# Patient Record
Sex: FEMALE | Race: WHITE | Employment: OTHER | URBAN - METROPOLITAN AREA
[De-identification: names, ages, dates, MRNs, and addresses within clinical notes are randomized per-mention and may not be internally consistent; named-entity substitution may affect disease eponyms.]

---

## 2017-03-20 ENCOUNTER — OFFICE VISIT (OUTPATIENT)
Dept: GASTROENTEROLOGY | Age: 67
End: 2017-03-20
Payer: MEDICARE

## 2017-03-20 VITALS
OXYGEN SATURATION: 93 % | BODY MASS INDEX: 24.63 KG/M2 | HEART RATE: 107 BPM | DIASTOLIC BLOOD PRESSURE: 77 MMHG | WEIGHT: 139 LBS | SYSTOLIC BLOOD PRESSURE: 121 MMHG | TEMPERATURE: 98.2 F | RESPIRATION RATE: 14 BRPM | HEIGHT: 63 IN

## 2017-03-20 DIAGNOSIS — R74.8 ELEVATED LIVER ENZYMES: ICD-10-CM

## 2017-03-20 DIAGNOSIS — D18.03 LIVER HEMANGIOMA: ICD-10-CM

## 2017-03-20 DIAGNOSIS — K57.90 DIVERTICULOSIS OF INTESTINE WITHOUT BLEEDING, UNSPECIFIED INTESTINAL TRACT LOCATION: ICD-10-CM

## 2017-03-20 DIAGNOSIS — K21.9 GASTROESOPHAGEAL REFLUX DISEASE WITHOUT ESOPHAGITIS: Primary | ICD-10-CM

## 2017-03-20 PROCEDURE — 3014F SCREEN MAMMO DOC REV: CPT | Performed by: INTERNAL MEDICINE

## 2017-03-20 PROCEDURE — G8428 CUR MEDS NOT DOCUMENT: HCPCS | Performed by: INTERNAL MEDICINE

## 2017-03-20 PROCEDURE — 99214 OFFICE O/P EST MOD 30 MIN: CPT | Performed by: INTERNAL MEDICINE

## 2017-03-20 PROCEDURE — 1036F TOBACCO NON-USER: CPT | Performed by: INTERNAL MEDICINE

## 2017-03-20 PROCEDURE — G8400 PT W/DXA NO RESULTS DOC: HCPCS | Performed by: INTERNAL MEDICINE

## 2017-03-20 PROCEDURE — G8484 FLU IMMUNIZE NO ADMIN: HCPCS | Performed by: INTERNAL MEDICINE

## 2017-03-20 PROCEDURE — 3017F COLORECTAL CA SCREEN DOC REV: CPT | Performed by: INTERNAL MEDICINE

## 2017-03-20 PROCEDURE — G8420 CALC BMI NORM PARAMETERS: HCPCS | Performed by: INTERNAL MEDICINE

## 2017-03-20 PROCEDURE — 1090F PRES/ABSN URINE INCON ASSESS: CPT | Performed by: INTERNAL MEDICINE

## 2017-03-20 PROCEDURE — 4040F PNEUMOC VAC/ADMIN/RCVD: CPT | Performed by: INTERNAL MEDICINE

## 2017-03-20 PROCEDURE — 1123F ACP DISCUSS/DSCN MKR DOCD: CPT | Performed by: INTERNAL MEDICINE

## 2017-03-20 RX ORDER — GREEN TEA/HOODIA GORDONII 315-12.5MG
1 CAPSULE ORAL DAILY
Qty: 30 TABLET | Refills: 3 | Status: SHIPPED | OUTPATIENT
Start: 2017-03-20 | End: 2017-08-10

## 2017-03-20 ASSESSMENT — ENCOUNTER SYMPTOMS
VOMITING: 0
RECTAL PAIN: 0
ABDOMINAL DISTENTION: 0
COUGH: 1
DIARRHEA: 1
CONSTIPATION: 0
NAUSEA: 0
EYES NEGATIVE: 1
ALLERGIC/IMMUNOLOGIC NEGATIVE: 1
ABDOMINAL PAIN: 0
ANAL BLEEDING: 0
SINUS PRESSURE: 1
BLOOD IN STOOL: 0

## 2017-04-07 ENCOUNTER — HOSPITAL ENCOUNTER (OUTPATIENT)
Dept: MRI IMAGING | Age: 67
Discharge: HOME | End: 2017-04-07
Payer: MEDICARE

## 2017-04-07 DIAGNOSIS — H91.91 HEARING LOSS, RIGHT: ICD-10-CM

## 2017-04-07 LAB
CREAT SERPL-MCNC: 0.79 MG/DL (ref 0.5–0.9)
GFR AFRICAN AMERICAN: >60 ML/MIN
GFR NON-AFRICAN AMERICAN: >60 ML/MIN
GFR SERPL CREATININE-BSD FRML MDRD: NORMAL ML/MIN/{1.73_M2}
GFR SERPL CREATININE-BSD FRML MDRD: NORMAL ML/MIN/{1.73_M2}

## 2017-04-07 PROCEDURE — 70553 MRI BRAIN STEM W/O & W/DYE: CPT

## 2017-04-07 PROCEDURE — A9579 GAD-BASE MR CONTRAST NOS,1ML: HCPCS | Performed by: OTOLARYNGOLOGY

## 2017-04-07 PROCEDURE — 82565 ASSAY OF CREATININE: CPT

## 2017-04-07 PROCEDURE — 6360000004 HC RX CONTRAST MEDICATION: Performed by: OTOLARYNGOLOGY

## 2017-04-07 PROCEDURE — 2580000003 HC RX 258: Performed by: OTOLARYNGOLOGY

## 2017-04-07 PROCEDURE — 36415 COLL VENOUS BLD VENIPUNCTURE: CPT

## 2017-04-07 RX ORDER — SODIUM CHLORIDE 0.9 % (FLUSH) 0.9 %
10 SYRINGE (ML) INJECTION PRN
Status: DISCONTINUED | OUTPATIENT
Start: 2017-04-07 | End: 2017-04-10 | Stop reason: HOSPADM

## 2017-04-07 RX ADMIN — Medication 10 ML: at 14:11

## 2017-04-07 RX ADMIN — GADOPENTETATE DIMEGLUMINE 12 ML: 469.01 INJECTION INTRAVENOUS at 14:10

## 2017-07-20 ENCOUNTER — HOSPITAL ENCOUNTER (OUTPATIENT)
Dept: WOMENS IMAGING | Age: 67
Discharge: HOME | End: 2017-07-20
Payer: MEDICARE

## 2017-07-20 DIAGNOSIS — Z13.9 SCREENING: ICD-10-CM

## 2017-07-20 PROCEDURE — 77063 BREAST TOMOSYNTHESIS BI: CPT

## 2017-08-10 ENCOUNTER — OFFICE VISIT (OUTPATIENT)
Dept: GASTROENTEROLOGY | Age: 67
End: 2017-08-10
Payer: MEDICARE

## 2017-08-10 VITALS
BODY MASS INDEX: 25.34 KG/M2 | WEIGHT: 143 LBS | HEART RATE: 92 BPM | RESPIRATION RATE: 14 BRPM | TEMPERATURE: 97.3 F | DIASTOLIC BLOOD PRESSURE: 82 MMHG | OXYGEN SATURATION: 96 % | SYSTOLIC BLOOD PRESSURE: 129 MMHG | HEIGHT: 63 IN

## 2017-08-10 DIAGNOSIS — R19.7 DIARRHEA, UNSPECIFIED TYPE: ICD-10-CM

## 2017-08-10 DIAGNOSIS — K21.9 GASTROESOPHAGEAL REFLUX DISEASE WITHOUT ESOPHAGITIS: Primary | ICD-10-CM

## 2017-08-10 PROCEDURE — 1036F TOBACCO NON-USER: CPT | Performed by: INTERNAL MEDICINE

## 2017-08-10 PROCEDURE — 99214 OFFICE O/P EST MOD 30 MIN: CPT | Performed by: INTERNAL MEDICINE

## 2017-08-10 PROCEDURE — 3017F COLORECTAL CA SCREEN DOC REV: CPT | Performed by: INTERNAL MEDICINE

## 2017-08-10 PROCEDURE — G8400 PT W/DXA NO RESULTS DOC: HCPCS | Performed by: INTERNAL MEDICINE

## 2017-08-10 PROCEDURE — G8427 DOCREV CUR MEDS BY ELIG CLIN: HCPCS | Performed by: INTERNAL MEDICINE

## 2017-08-10 PROCEDURE — 4040F PNEUMOC VAC/ADMIN/RCVD: CPT | Performed by: INTERNAL MEDICINE

## 2017-08-10 PROCEDURE — 1123F ACP DISCUSS/DSCN MKR DOCD: CPT | Performed by: INTERNAL MEDICINE

## 2017-08-10 PROCEDURE — 3014F SCREEN MAMMO DOC REV: CPT | Performed by: INTERNAL MEDICINE

## 2017-08-10 PROCEDURE — G8419 CALC BMI OUT NRM PARAM NOF/U: HCPCS | Performed by: INTERNAL MEDICINE

## 2017-08-10 PROCEDURE — 1090F PRES/ABSN URINE INCON ASSESS: CPT | Performed by: INTERNAL MEDICINE

## 2017-08-10 ASSESSMENT — ENCOUNTER SYMPTOMS
ANAL BLEEDING: 0
ABDOMINAL DISTENTION: 0
ABDOMINAL PAIN: 0
VOMITING: 0
NAUSEA: 0
COUGH: 0
RECTAL PAIN: 0
EYES NEGATIVE: 1
BLOOD IN STOOL: 0
CONSTIPATION: 0
RESPIRATORY NEGATIVE: 1
DIARRHEA: 1
ALLERGIC/IMMUNOLOGIC NEGATIVE: 1
SINUS PRESSURE: 0

## 2018-01-10 ENCOUNTER — HOSPITAL ENCOUNTER (OUTPATIENT)
Age: 68
Discharge: HOME | End: 2018-01-10
Payer: MEDICARE

## 2018-01-10 DIAGNOSIS — E78.5 HYPERLIPIDEMIA WITH TARGET LDL LESS THAN 130: Chronic | ICD-10-CM

## 2018-01-10 LAB
ALBUMIN SERPL-MCNC: 4.2 G/DL (ref 3.5–5.2)
ALBUMIN/GLOBULIN RATIO: ABNORMAL (ref 1–2.5)
ALP BLD-CCNC: 72 U/L (ref 35–104)
ALT SERPL-CCNC: 21 U/L (ref 5–33)
ANION GAP SERPL CALCULATED.3IONS-SCNC: 14 MMOL/L (ref 9–17)
AST SERPL-CCNC: 16 U/L
BILIRUB SERPL-MCNC: 0.5 MG/DL (ref 0.3–1.2)
BUN BLDV-MCNC: 15 MG/DL (ref 8–23)
BUN/CREAT BLD: ABNORMAL (ref 9–20)
CALCIUM SERPL-MCNC: 9.4 MG/DL (ref 8.6–10.4)
CHLORIDE BLD-SCNC: 105 MMOL/L (ref 98–107)
CHOLESTEROL/HDL RATIO: 3.4
CHOLESTEROL: 207 MG/DL
CO2: 24 MMOL/L (ref 20–31)
CREAT SERPL-MCNC: 0.69 MG/DL (ref 0.5–0.9)
GFR AFRICAN AMERICAN: >60 ML/MIN
GFR NON-AFRICAN AMERICAN: >60 ML/MIN
GFR SERPL CREATININE-BSD FRML MDRD: ABNORMAL ML/MIN/{1.73_M2}
GFR SERPL CREATININE-BSD FRML MDRD: ABNORMAL ML/MIN/{1.73_M2}
GLUCOSE BLD-MCNC: 108 MG/DL (ref 70–99)
HDLC SERPL-MCNC: 61 MG/DL
LDL CHOLESTEROL: 88 MG/DL (ref 0–130)
POTASSIUM SERPL-SCNC: 4.2 MMOL/L (ref 3.7–5.3)
SODIUM BLD-SCNC: 143 MMOL/L (ref 135–144)
TOTAL PROTEIN: 6.7 G/DL (ref 6.4–8.3)
TRIGL SERPL-MCNC: 289 MG/DL
VLDLC SERPL CALC-MCNC: ABNORMAL MG/DL (ref 1–30)

## 2018-01-10 PROCEDURE — 80061 LIPID PANEL: CPT

## 2018-01-10 PROCEDURE — 80053 COMPREHEN METABOLIC PANEL: CPT

## 2018-01-10 PROCEDURE — 36415 COLL VENOUS BLD VENIPUNCTURE: CPT

## 2018-02-05 ENCOUNTER — OFFICE VISIT (OUTPATIENT)
Dept: GASTROENTEROLOGY | Age: 68
End: 2018-02-05
Payer: MEDICARE

## 2018-02-05 VITALS
HEIGHT: 63 IN | HEART RATE: 76 BPM | BODY MASS INDEX: 25.34 KG/M2 | SYSTOLIC BLOOD PRESSURE: 133 MMHG | DIASTOLIC BLOOD PRESSURE: 86 MMHG | OXYGEN SATURATION: 97 % | RESPIRATION RATE: 14 BRPM | WEIGHT: 143 LBS

## 2018-02-05 DIAGNOSIS — R19.7 DIARRHEA, UNSPECIFIED TYPE: ICD-10-CM

## 2018-02-05 DIAGNOSIS — Z86.010 HX OF COLONIC POLYPS: ICD-10-CM

## 2018-02-05 DIAGNOSIS — K21.9 GASTROESOPHAGEAL REFLUX DISEASE WITHOUT ESOPHAGITIS: Primary | ICD-10-CM

## 2018-02-05 DIAGNOSIS — R74.8 ELEVATED LIVER ENZYMES: ICD-10-CM

## 2018-02-05 DIAGNOSIS — D18.03 LIVER HEMANGIOMA: ICD-10-CM

## 2018-02-05 DIAGNOSIS — K57.90 DIVERTICULOSIS OF INTESTINE WITHOUT BLEEDING, UNSPECIFIED INTESTINAL TRACT LOCATION: ICD-10-CM

## 2018-02-05 PROCEDURE — 3014F SCREEN MAMMO DOC REV: CPT | Performed by: INTERNAL MEDICINE

## 2018-02-05 PROCEDURE — G8427 DOCREV CUR MEDS BY ELIG CLIN: HCPCS | Performed by: INTERNAL MEDICINE

## 2018-02-05 PROCEDURE — 1123F ACP DISCUSS/DSCN MKR DOCD: CPT | Performed by: INTERNAL MEDICINE

## 2018-02-05 PROCEDURE — 3017F COLORECTAL CA SCREEN DOC REV: CPT | Performed by: INTERNAL MEDICINE

## 2018-02-05 PROCEDURE — G8400 PT W/DXA NO RESULTS DOC: HCPCS | Performed by: INTERNAL MEDICINE

## 2018-02-05 PROCEDURE — G8417 CALC BMI ABV UP PARAM F/U: HCPCS | Performed by: INTERNAL MEDICINE

## 2018-02-05 PROCEDURE — 4040F PNEUMOC VAC/ADMIN/RCVD: CPT | Performed by: INTERNAL MEDICINE

## 2018-02-05 PROCEDURE — 99214 OFFICE O/P EST MOD 30 MIN: CPT | Performed by: INTERNAL MEDICINE

## 2018-02-05 PROCEDURE — 1090F PRES/ABSN URINE INCON ASSESS: CPT | Performed by: INTERNAL MEDICINE

## 2018-02-05 PROCEDURE — G8482 FLU IMMUNIZE ORDER/ADMIN: HCPCS | Performed by: INTERNAL MEDICINE

## 2018-02-05 PROCEDURE — 1036F TOBACCO NON-USER: CPT | Performed by: INTERNAL MEDICINE

## 2018-02-05 RX ORDER — SODIUM, POTASSIUM,MAG SULFATES 17.5-3.13G
SOLUTION, RECONSTITUTED, ORAL ORAL
Qty: 1 BOTTLE | Refills: 0 | Status: SHIPPED | OUTPATIENT
Start: 2018-02-05 | End: 2018-09-19

## 2018-02-05 ASSESSMENT — ENCOUNTER SYMPTOMS
ABDOMINAL DISTENTION: 0
ALLERGIC/IMMUNOLOGIC NEGATIVE: 1
DIARRHEA: 1
ABDOMINAL PAIN: 0
SINUS PRESSURE: 0
RECTAL PAIN: 0
ANAL BLEEDING: 0
VOMITING: 0
BLOOD IN STOOL: 0
NAUSEA: 0
RESPIRATORY NEGATIVE: 1
EYES NEGATIVE: 1
CONSTIPATION: 0
COUGH: 0

## 2018-02-21 ENCOUNTER — HOSPITAL ENCOUNTER (OUTPATIENT)
Age: 68
Setting detail: OUTPATIENT SURGERY
Discharge: HOME | End: 2018-02-21
Attending: INTERNAL MEDICINE | Admitting: INTERNAL MEDICINE
Payer: MEDICARE

## 2018-02-21 VITALS
RESPIRATION RATE: 16 BRPM | SYSTOLIC BLOOD PRESSURE: 124 MMHG | DIASTOLIC BLOOD PRESSURE: 65 MMHG | TEMPERATURE: 98.2 F | BODY MASS INDEX: 25.69 KG/M2 | HEIGHT: 63 IN | HEART RATE: 97 BPM | WEIGHT: 145 LBS | OXYGEN SATURATION: 97 %

## 2018-02-21 PROCEDURE — 6360000002 HC RX W HCPCS: Performed by: INTERNAL MEDICINE

## 2018-02-21 PROCEDURE — 7100000010 HC PHASE II RECOVERY - FIRST 15 MIN: Performed by: INTERNAL MEDICINE

## 2018-02-21 PROCEDURE — 99152 MOD SED SAME PHYS/QHP 5/>YRS: CPT | Performed by: INTERNAL MEDICINE

## 2018-02-21 PROCEDURE — 45380 COLONOSCOPY AND BIOPSY: CPT | Performed by: INTERNAL MEDICINE

## 2018-02-21 PROCEDURE — 2580000003 HC RX 258: Performed by: INTERNAL MEDICINE

## 2018-02-21 PROCEDURE — 7100000011 HC PHASE II RECOVERY - ADDTL 15 MIN: Performed by: INTERNAL MEDICINE

## 2018-02-21 PROCEDURE — 99153 MOD SED SAME PHYS/QHP EA: CPT | Performed by: INTERNAL MEDICINE

## 2018-02-21 PROCEDURE — 88305 TISSUE EXAM BY PATHOLOGIST: CPT

## 2018-02-21 PROCEDURE — 3609010300 HC COLONOSCOPY W/BIOPSY SINGLE/MULTIPLE: Performed by: INTERNAL MEDICINE

## 2018-02-21 RX ORDER — MEPERIDINE HYDROCHLORIDE 50 MG/ML
INJECTION INTRAMUSCULAR; INTRAVENOUS; SUBCUTANEOUS PRN
Status: DISCONTINUED | OUTPATIENT
Start: 2018-02-21 | End: 2018-02-21 | Stop reason: HOSPADM

## 2018-02-21 RX ORDER — MIDAZOLAM HYDROCHLORIDE 1 MG/ML
INJECTION INTRAMUSCULAR; INTRAVENOUS PRN
Status: DISCONTINUED | OUTPATIENT
Start: 2018-02-21 | End: 2018-02-21 | Stop reason: HOSPADM

## 2018-02-21 RX ORDER — SODIUM CHLORIDE, SODIUM LACTATE, POTASSIUM CHLORIDE, CALCIUM CHLORIDE 600; 310; 30; 20 MG/100ML; MG/100ML; MG/100ML; MG/100ML
INJECTION, SOLUTION INTRAVENOUS CONTINUOUS
Status: DISCONTINUED | OUTPATIENT
Start: 2018-02-21 | End: 2018-02-21 | Stop reason: HOSPADM

## 2018-02-21 RX ADMIN — SODIUM CHLORIDE, POTASSIUM CHLORIDE, SODIUM LACTATE AND CALCIUM CHLORIDE: 600; 310; 30; 20 INJECTION, SOLUTION INTRAVENOUS at 08:47

## 2018-02-21 ASSESSMENT — PAIN - FUNCTIONAL ASSESSMENT: PAIN_FUNCTIONAL_ASSESSMENT: 0-10

## 2018-02-21 ASSESSMENT — PAIN SCALES - GENERAL
PAINLEVEL_OUTOF10: 0

## 2018-02-21 NOTE — H&P
HISTORY and [facility] [address]       NAME:  Freda Ortez  MRN: [id number]   YOB: 1950   Date: 2/21/2018   Age: 77 y.o. Gender: female       COMPLAINT AND PRESENT HISTORY:   77 y o female with colonoscopy. Last was 3 years ago. Patient with history of polyps. Tends to have diarrhea off/on. Denies abdominal pain,  constipation or black/bloody stool. Father had colon polyps. PAST MEDICAL HISTORY     Past Medical History:   Diagnosis Date    Breast cancer ([facility] [address] 1998    with augmentation and reconstruction    Colon polyp     Depression     Diverticulosis     Elevated liver enzymes     GERD (gastroesophageal reflux disease)     Hemorrhoids     Hyperlipidemia     Hypertension     PT DENIES HYPERTENSION    Liver hemangioma     Migraine headache     Overweight (BMI 25.0-29.9) 5/11/2015    Restless leg syndrome, uncontrolled 7/27/2016    Tubular adenoma          SURGICAL HISTORY       Past Surgical History:   Procedure Laterality Date    BREAST SURGERY      COLONOSCOPY      COLONOSCOPY  2 10 15    DIVERTICULOSIS AND HEMORRHOIDS , TUBULAR ADENOMA     EYE SURGERY      MASTECTOMY      SPINE SURGERY      L3,4,5    TUBAL LIGATION           SOCIAL HISTORY       Social History     Social History    Marital status:      Spouse name: N/A    Number of children: N/A    Years of education: N/A     Social History Main Topics    Smoking status: Former Smoker     Years: 3.00    Smokeless tobacco: Never Used    Alcohol use Yes      Comment: OCCASIONAL, 2     Drug use: No    Sexual activity: Not Asked     Other Topics Concern    None     Social History Narrative    None           REVIEW OF SYSTEMS      Allergies   Allergen Reactions    Darvon [Propoxyphene]     Morphine        No current facility-administered medications on file prior to encounter.       Current Outpatient Prescriptions on File Prior to Encounter   Medication Sig Dispense Refill    Na HEART:  RRR S1 > S2. No audible murmurs or gallops. LUNGS:  Equal on expansion, normal breath sounds. No adventitious sounds. ABDOMEN:  Obese. Soft on palpation. No localized tenderness. No guarding or rigidity. No palpable organomegaly. LYMPHATICS:  No palpable cervical lymphadenopathy. LOCOMOTOR, BACK AND SPINE:  No tenderness or deformities. EXTREMITIES:  Symmetrical, no pedal edema. Navdeeps sign negative. No discoloration or ulcerations. NEUROLOGIC:  The patient is conscious, alert, oriented, No apparent focal sensory or motor deficits.                       PROVISIONAL DIAGNOSES / SURGERY:      Colonoscopy, history of polyps    Patient Active Problem List    Diagnosis Date Noted    Diarrhea 08/10/2017    Restless leg syndrome, uncontrolled 07/27/2016    Snoring 05/10/2016    Exercise counseling 05/10/2016    Diverticulosis of intestine without bleeding 03/14/2016    Gastroesophageal reflux disease without esophagitis 03/14/2016    Hemorrhoids     Tubular adenoma     Migraine without aura and without status migrainosus, not intractable 11/10/2015    Liver hemangioma     Elevated liver enzymes     Hyperlipidemia with target LDL less than 130 05/11/2015    Migraine headache 05/11/2015    Screening mammogram, encounter for 05/11/2015    BMI 25.0-25.9,adult 05/11/2015    GERD (gastroesophageal reflux disease)     Diverticulosis     Colon polyp            REBECCA ALONSO NP on 2/21/2018 at 8:44 AM

## 2018-02-22 LAB — SURGICAL PATHOLOGY REPORT: NORMAL

## 2018-07-23 ENCOUNTER — HOSPITAL ENCOUNTER (OUTPATIENT)
Dept: WOMENS IMAGING | Age: 68
Discharge: HOME | End: 2018-07-25
Payer: MEDICARE

## 2018-07-23 DIAGNOSIS — Z12.31 VISIT FOR SCREENING MAMMOGRAM: ICD-10-CM

## 2018-07-23 PROCEDURE — 77063 BREAST TOMOSYNTHESIS BI: CPT

## 2018-09-19 ENCOUNTER — OFFICE VISIT (OUTPATIENT)
Dept: GASTROENTEROLOGY | Age: 68
End: 2018-09-19
Payer: MEDICARE

## 2018-09-19 VITALS
DIASTOLIC BLOOD PRESSURE: 75 MMHG | HEART RATE: 80 BPM | OXYGEN SATURATION: 94 % | HEIGHT: 63 IN | BODY MASS INDEX: 26.93 KG/M2 | SYSTOLIC BLOOD PRESSURE: 124 MMHG | WEIGHT: 152 LBS

## 2018-09-19 DIAGNOSIS — K57.90 DIVERTICULOSIS OF INTESTINE WITHOUT BLEEDING, UNSPECIFIED INTESTINAL TRACT LOCATION: ICD-10-CM

## 2018-09-19 DIAGNOSIS — R19.7 DIARRHEA, UNSPECIFIED TYPE: ICD-10-CM

## 2018-09-19 DIAGNOSIS — R74.8 ELEVATED LIVER ENZYMES: ICD-10-CM

## 2018-09-19 DIAGNOSIS — K21.9 GASTROESOPHAGEAL REFLUX DISEASE WITHOUT ESOPHAGITIS: Primary | ICD-10-CM

## 2018-09-19 DIAGNOSIS — D18.03 LIVER HEMANGIOMA: ICD-10-CM

## 2018-09-19 DIAGNOSIS — Z86.010 HX OF COLONIC POLYPS: ICD-10-CM

## 2018-09-19 PROCEDURE — 1090F PRES/ABSN URINE INCON ASSESS: CPT | Performed by: INTERNAL MEDICINE

## 2018-09-19 PROCEDURE — 1101F PT FALLS ASSESS-DOCD LE1/YR: CPT | Performed by: INTERNAL MEDICINE

## 2018-09-19 PROCEDURE — 4040F PNEUMOC VAC/ADMIN/RCVD: CPT | Performed by: INTERNAL MEDICINE

## 2018-09-19 PROCEDURE — G8427 DOCREV CUR MEDS BY ELIG CLIN: HCPCS | Performed by: INTERNAL MEDICINE

## 2018-09-19 PROCEDURE — G8400 PT W/DXA NO RESULTS DOC: HCPCS | Performed by: INTERNAL MEDICINE

## 2018-09-19 PROCEDURE — 1036F TOBACCO NON-USER: CPT | Performed by: INTERNAL MEDICINE

## 2018-09-19 PROCEDURE — G8417 CALC BMI ABV UP PARAM F/U: HCPCS | Performed by: INTERNAL MEDICINE

## 2018-09-19 PROCEDURE — 99214 OFFICE O/P EST MOD 30 MIN: CPT | Performed by: INTERNAL MEDICINE

## 2018-09-19 PROCEDURE — 1123F ACP DISCUSS/DSCN MKR DOCD: CPT | Performed by: INTERNAL MEDICINE

## 2018-09-19 PROCEDURE — 3017F COLORECTAL CA SCREEN DOC REV: CPT | Performed by: INTERNAL MEDICINE

## 2018-09-19 ASSESSMENT — ENCOUNTER SYMPTOMS
ABDOMINAL PAIN: 0
RESPIRATORY NEGATIVE: 1
DIARRHEA: 1
ANAL BLEEDING: 0
EYES NEGATIVE: 1
BACK PAIN: 1
ALLERGIC/IMMUNOLOGIC NEGATIVE: 1
NAUSEA: 1
CONSTIPATION: 0
VOMITING: 0
RECTAL PAIN: 0
BLOOD IN STOOL: 0
ABDOMINAL DISTENTION: 1

## 2018-09-19 NOTE — PROGRESS NOTES
hypotension, and perforation of the colon and possibility of missing a lesion. The patient understands and is in agreement. PROCEDURE: The patient was given IV conscious sedation. The patient's SPO2 remained above 90% throughout the procedure.      The colonoscope was inserted per rectum and advanced under direct vision to the cecum without difficulty.       Post sedation note : The patient's SPO2 remained above 90% throughout the procedure. the vital signs remained stable , and no immediate complication form the procedure noted, patient will be ready for d/c when criteria is met .           The prep was poor.       Findings:  Terminal ileum: normal     Cecum/Ascending colon: normal     Transverse colon: abnormal: few diverticula      Descending/Sigmoid colon: abnormal: few diverticula      Rectum/Anus: examined in normal and retroflexed positions and was abnormal: hemorrhoids      Withdrawal Time was (minutes): 10     The colon was decompressed and the scope was removed. The patient tolerated the procedure well.      Recommendations/Plan:   1. Lifestyle and dietary modifications as discussed  2. F/U Biopsies  3. F/U In OfficeYes  4. Discussed with the family  5. Repeat colonoscopy [id number]     Electronically signed by Joe Aagrwal MD  on 2/21/2018 at 10:15 AM       Final Diagnosis   COLON, MUCOSA, RANDOM BIOPSIES:         FRAGMENTS OF BENIGN LARGE INTESTINAL MUCOSA NEGATIVE FOR   DYSPLASIA, CRYPT ABSCESSES, Zaida Roach M.D.   **Electronically Signed Out**         tc/2/22/2018   Review of Systems   Constitutional: Positive for fatigue. HENT: Negative. Eyes: Negative. Respiratory: Negative. Cardiovascular: Negative. Gastrointestinal: Positive for abdominal distention, diarrhea (occasional) and nausea (on and off).  Negative for abdominal pain, anal bleeding, blood in stool, constipation, rectal pain and vomiting. Endocrine: Negative. Genitourinary: Negative. Musculoskeletal: Positive for arthralgias, back pain, gait problem and myalgias. Skin: Negative. Allergic/Immunologic: Negative. Neurological: Positive for headaches. Negative for dizziness, tremors, seizures, weakness, light-headedness and numbness. Hematological: Negative. Does not bruise/bleed easily. Psychiatric/Behavioral: The patient is nervous/anxious. Objective:   Physical Exam   Constitutional: She is oriented to person, place, and time. She appears well-developed and well-nourished. No distress. HENT:   Head: Normocephalic. Mouth/Throat: No oropharyngeal exudate. Eyes: Pupils are equal, round, and reactive to light. No scleral icterus. Neck: Normal range of motion. Neck supple. No JVD present. No tracheal deviation present. No thyromegaly present. Cardiovascular: Normal rate, regular rhythm, normal heart sounds and intact distal pulses. No murmur heard. Pulmonary/Chest: Effort normal and breath sounds normal. No respiratory distress. She has no wheezes. Abdominal: Soft. Bowel sounds are normal. She exhibits no distension. There is no tenderness. There is no rebound and no guarding. No ascites   Musculoskeletal: Normal range of motion. She exhibits no edema. Neurological: She is alert and oriented to person, place, and time. She has normal reflexes. Skin: Skin is warm. No rash noted. She is not diaphoretic. No erythema. No pallor. She is not diaphoretic   Psychiatric: She has a normal mood and affect. Her behavior is normal. Judgment and thought content normal.   Nursing note and vitals reviewed. Assessment:       Diagnosis Orders   1. Gastroesophageal reflux disease without esophagitis  Creatinine, Serum    BUN   2. Diarrhea, unspecified type     3. Diverticulosis of intestine without bleeding, unspecified intestinal tract location     4.  Elevated liver enzymes

## 2018-10-03 ENCOUNTER — OFFICE VISIT (OUTPATIENT)
Dept: PODIATRY | Age: 68
End: 2018-10-03
Payer: MEDICARE

## 2018-10-03 VITALS
BODY MASS INDEX: 26.58 KG/M2 | WEIGHT: 150 LBS | DIASTOLIC BLOOD PRESSURE: 86 MMHG | SYSTOLIC BLOOD PRESSURE: 136 MMHG | HEART RATE: 92 BPM | HEIGHT: 63 IN

## 2018-10-03 DIAGNOSIS — M77.52 BONE SPUR OF LEFT FOOT: ICD-10-CM

## 2018-10-03 DIAGNOSIS — M19.072 PRIMARY OSTEOARTHRITIS OF LEFT FOOT: Primary | ICD-10-CM

## 2018-10-03 PROCEDURE — 1036F TOBACCO NON-USER: CPT | Performed by: PODIATRIST

## 2018-10-03 PROCEDURE — 73630 X-RAY EXAM OF FOOT: CPT | Performed by: PODIATRIST

## 2018-10-03 PROCEDURE — G8482 FLU IMMUNIZE ORDER/ADMIN: HCPCS | Performed by: PODIATRIST

## 2018-10-03 PROCEDURE — 4040F PNEUMOC VAC/ADMIN/RCVD: CPT | Performed by: PODIATRIST

## 2018-10-03 PROCEDURE — G8400 PT W/DXA NO RESULTS DOC: HCPCS | Performed by: PODIATRIST

## 2018-10-03 PROCEDURE — G8427 DOCREV CUR MEDS BY ELIG CLIN: HCPCS | Performed by: PODIATRIST

## 2018-10-03 PROCEDURE — 1090F PRES/ABSN URINE INCON ASSESS: CPT | Performed by: PODIATRIST

## 2018-10-03 PROCEDURE — G8417 CALC BMI ABV UP PARAM F/U: HCPCS | Performed by: PODIATRIST

## 2018-10-03 PROCEDURE — 1101F PT FALLS ASSESS-DOCD LE1/YR: CPT | Performed by: PODIATRIST

## 2018-10-03 PROCEDURE — 1123F ACP DISCUSS/DSCN MKR DOCD: CPT | Performed by: PODIATRIST

## 2018-10-03 PROCEDURE — 99203 OFFICE O/P NEW LOW 30 MIN: CPT | Performed by: PODIATRIST

## 2018-10-03 PROCEDURE — 3017F COLORECTAL CA SCREEN DOC REV: CPT | Performed by: PODIATRIST

## 2018-10-03 ASSESSMENT — ENCOUNTER SYMPTOMS
VOMITING: 0
COLOR CHANGE: 0
CONSTIPATION: 0
DIARRHEA: 0
NAUSEA: 0

## 2018-10-24 ENCOUNTER — HOSPITAL ENCOUNTER (OUTPATIENT)
Dept: PREADMISSION TESTING | Age: 68
Discharge: HOME | End: 2018-10-28
Payer: MEDICARE

## 2018-10-24 VITALS
BODY MASS INDEX: 26.75 KG/M2 | SYSTOLIC BLOOD PRESSURE: 152 MMHG | TEMPERATURE: 97.9 F | DIASTOLIC BLOOD PRESSURE: 83 MMHG | WEIGHT: 151 LBS | HEIGHT: 63 IN | OXYGEN SATURATION: 99 % | HEART RATE: 85 BPM | RESPIRATION RATE: 16 BRPM

## 2018-10-24 LAB
ABSOLUTE EOS #: 0 K/UL (ref 0–0.4)
ABSOLUTE IMMATURE GRANULOCYTE: ABNORMAL K/UL (ref 0–0.3)
ABSOLUTE LYMPH #: 3 K/UL (ref 1–4.8)
ABSOLUTE MONO #: 0.8 K/UL (ref 0.1–1.3)
ANION GAP SERPL CALCULATED.3IONS-SCNC: 13 MMOL/L (ref 9–17)
BASOPHILS # BLD: 1 % (ref 0–2)
BASOPHILS ABSOLUTE: 0 K/UL (ref 0–0.2)
BUN BLDV-MCNC: 19 MG/DL (ref 8–23)
BUN/CREAT BLD: ABNORMAL (ref 9–20)
CALCIUM SERPL-MCNC: 9.9 MG/DL (ref 8.6–10.4)
CHLORIDE BLD-SCNC: 102 MMOL/L (ref 98–107)
CO2: 24 MMOL/L (ref 20–31)
CREAT SERPL-MCNC: 0.78 MG/DL (ref 0.5–0.9)
DIFFERENTIAL TYPE: ABNORMAL
EKG ATRIAL RATE: 77 BPM
EKG P AXIS: 42 DEGREES
EKG P-R INTERVAL: 168 MS
EKG Q-T INTERVAL: 394 MS
EKG QRS DURATION: 98 MS
EKG QTC CALCULATION (BAZETT): 445 MS
EKG R AXIS: -16 DEGREES
EKG T AXIS: 25 DEGREES
EKG VENTRICULAR RATE: 77 BPM
EOSINOPHILS RELATIVE PERCENT: 0 % (ref 0–4)
GFR AFRICAN AMERICAN: >60 ML/MIN
GFR NON-AFRICAN AMERICAN: >60 ML/MIN
GFR SERPL CREATININE-BSD FRML MDRD: ABNORMAL ML/MIN/{1.73_M2}
GFR SERPL CREATININE-BSD FRML MDRD: ABNORMAL ML/MIN/{1.73_M2}
GLUCOSE BLD-MCNC: 102 MG/DL (ref 70–99)
HCT VFR BLD CALC: 41.6 % (ref 36–46)
HEMOGLOBIN: 13.9 G/DL (ref 12–16)
IMMATURE GRANULOCYTES: ABNORMAL %
LYMPHOCYTES # BLD: 40 % (ref 24–44)
MCH RBC QN AUTO: 30.3 PG (ref 26–34)
MCHC RBC AUTO-ENTMCNC: 33.3 G/DL (ref 31–37)
MCV RBC AUTO: 91.1 FL (ref 80–100)
MONOCYTES # BLD: 10 % (ref 1–7)
NRBC AUTOMATED: ABNORMAL PER 100 WBC
PDW BLD-RTO: 13.5 % (ref 11.5–14.9)
PLATELET # BLD: 244 K/UL (ref 150–450)
PLATELET ESTIMATE: ABNORMAL
PMV BLD AUTO: 8.5 FL (ref 6–12)
POTASSIUM SERPL-SCNC: 4.8 MMOL/L (ref 3.7–5.3)
RBC # BLD: 4.57 M/UL (ref 4–5.2)
RBC # BLD: ABNORMAL 10*6/UL
SEG NEUTROPHILS: 49 % (ref 36–66)
SEGMENTED NEUTROPHILS ABSOLUTE COUNT: 3.6 K/UL (ref 1.3–9.1)
SODIUM BLD-SCNC: 139 MMOL/L (ref 135–144)
WBC # BLD: 7.3 K/UL (ref 3.5–11)
WBC # BLD: ABNORMAL 10*3/UL

## 2018-10-24 PROCEDURE — 80048 BASIC METABOLIC PNL TOTAL CA: CPT

## 2018-10-24 PROCEDURE — 36415 COLL VENOUS BLD VENIPUNCTURE: CPT

## 2018-10-24 PROCEDURE — 93005 ELECTROCARDIOGRAM TRACING: CPT

## 2018-10-24 PROCEDURE — 85025 COMPLETE CBC W/AUTO DIFF WBC: CPT

## 2018-10-24 RX ORDER — NAPROXEN SODIUM 220 MG
220 TABLET ORAL 2 TIMES DAILY WITH MEALS
COMMUNITY
End: 2019-04-08

## 2018-10-24 NOTE — H&P
the partial mastectomy    MASTECTOMY, PARTIAL Left     for cancer    SPINE SURGERY      L3,4,5, fusion    TUBAL LIGATION         FAMILY HISTORY       Family History   Problem Relation Age of Onset    Ovarian Cancer Mother     Heart Attack Father     Breast Cancer Maternal Grandmother        SOCIAL HISTORY       Social History     Social History    Marital status:      Spouse name: N/A    Number of children: N/A    Years of education: N/A     Social History Main Topics    Smoking status: Former Smoker     Packs/day: 0.50     Years: 3.00     Quit date: 2008    Smokeless tobacco: Never Used    Alcohol use Yes      Comment: 1-2 per week    Drug use: No    Sexual activity: Not Asked     Other Topics Concern    None     Social History Narrative    None           REVIEW OF SYSTEMS      Allergies   Allergen Reactions    Darvon [Propoxyphene] Nausea And Vomiting and Other (See Comments)     Severe headaches    Morphine Nausea And Vomiting and Other (See Comments)     Severe headaches       Current Outpatient Prescriptions on File Prior to Encounter   Medication Sig Dispense Refill    pramipexole (MIRAPEX) 0.5 MG tablet TAKE ONE TABLET BY MOUTH ONCE NIGHTLY 90 tablet 3    citalopram (CELEXA) 20 MG tablet TAKE ONE TABLET BY MOUTH ONCE DAILY 90 tablet 3    atorvastatin (LIPITOR) 20 MG tablet Take 1 tablet by mouth daily 90 tablet 3    SUMAtriptan (IMITREX) 50 MG tablet Take at onset of headache; may repeat in 2 hours; no more than 2 in 24 hours. 9 tablet 11    Multiple Vitamins-Minerals (THERAPEUTIC MULTIVITAMIN-MINERALS) tablet Take 1 tablet by mouth daily.  omeprazole (PRILOSEC) 20 MG capsule Take 20 mg by mouth daily. No current facility-administered medications on file prior to encounter. General health:  Fairly good. No fever or chills. Skin:  No itching, redness or rash. HEENT:  No headache, epistaxis or sore throat.                  Neck: The patient is conscious, alert, oriented, No apparent focal sensory or motor deficits.                                                                                      PROVISIONAL DIAGNOSES / SURGERY:           Primary osteoarthritis of left foot          Bone spur of left foot     EXOSTECTOMY DORSAL FOOT-Left    Patient Active Problem List    Diagnosis Date Noted    Diarrhea 08/10/2017    Restless leg syndrome, uncontrolled 07/27/2016    Snoring 05/10/2016    Exercise counseling 05/10/2016    Diverticulosis of intestine without bleeding 03/14/2016    Gastroesophageal reflux disease without esophagitis 03/14/2016    Hemorrhoids     Tubular adenoma     Migraine without aura and without status migrainosus, not intractable 11/10/2015    Liver hemangioma     Elevated liver enzymes     Hyperlipidemia with target LDL less than 130 05/11/2015    Migraine headache 05/11/2015    BMI 25.0-25.9,adult 05/11/2015    GERD (gastroesophageal reflux disease)     Diverticulosis     Colon polyp            ILIANA ROE, ANNIE - CNP on 10/24/2018 at 10:17 AM

## 2018-10-26 ENCOUNTER — ANESTHESIA EVENT (OUTPATIENT)
Dept: OPERATING ROOM | Age: 68
End: 2018-10-26
Payer: MEDICARE

## 2018-10-26 RX ORDER — SODIUM CHLORIDE 0.9 % (FLUSH) 0.9 %
10 SYRINGE (ML) INJECTION PRN
Status: CANCELLED | OUTPATIENT
Start: 2018-10-26

## 2018-10-26 RX ORDER — LIDOCAINE HYDROCHLORIDE 10 MG/ML
1 INJECTION, SOLUTION EPIDURAL; INFILTRATION; INTRACAUDAL; PERINEURAL
Status: CANCELLED | OUTPATIENT
Start: 2018-10-26 | End: 2018-10-26

## 2018-10-26 RX ORDER — SODIUM CHLORIDE, SODIUM LACTATE, POTASSIUM CHLORIDE, CALCIUM CHLORIDE 600; 310; 30; 20 MG/100ML; MG/100ML; MG/100ML; MG/100ML
INJECTION, SOLUTION INTRAVENOUS CONTINUOUS
Status: CANCELLED | OUTPATIENT
Start: 2018-10-26

## 2018-10-26 RX ORDER — SODIUM CHLORIDE 0.9 % (FLUSH) 0.9 %
10 SYRINGE (ML) INJECTION EVERY 12 HOURS SCHEDULED
Status: CANCELLED | OUTPATIENT
Start: 2018-10-26

## 2018-10-29 ENCOUNTER — HOSPITAL ENCOUNTER (OUTPATIENT)
Age: 68
Setting detail: SPECIMEN
Discharge: HOME | End: 2018-10-29
Payer: MEDICARE

## 2018-10-29 DIAGNOSIS — D18.03 LIVER HEMANGIOMA: ICD-10-CM

## 2018-10-29 DIAGNOSIS — R74.8 ELEVATED LIVER ENZYMES: ICD-10-CM

## 2018-10-29 LAB
ALBUMIN SERPL-MCNC: 4.4 G/DL (ref 3.5–5.2)
ALBUMIN/GLOBULIN RATIO: 1.9 (ref 1–2.5)
ALP BLD-CCNC: 69 U/L (ref 35–104)
ALT SERPL-CCNC: 21 U/L (ref 5–33)
AST SERPL-CCNC: 18 U/L
BILIRUB SERPL-MCNC: 0.27 MG/DL (ref 0.3–1.2)
BILIRUBIN DIRECT: <0.08 MG/DL
BILIRUBIN, INDIRECT: ABNORMAL MG/DL (ref 0–1)
GLOBULIN: ABNORMAL G/DL (ref 1.5–3.8)
TOTAL PROTEIN: 6.7 G/DL (ref 6.4–8.3)

## 2018-11-05 ENCOUNTER — ANESTHESIA (OUTPATIENT)
Dept: OPERATING ROOM | Age: 68
End: 2018-11-05
Payer: MEDICARE

## 2018-11-05 ENCOUNTER — HOSPITAL ENCOUNTER (OUTPATIENT)
Age: 68
Setting detail: OUTPATIENT SURGERY
Discharge: HOME | End: 2018-11-05
Attending: PODIATRIST | Admitting: PODIATRIST
Payer: MEDICARE

## 2018-11-05 VITALS
WEIGHT: 151 LBS | HEIGHT: 63 IN | DIASTOLIC BLOOD PRESSURE: 62 MMHG | OXYGEN SATURATION: 95 % | BODY MASS INDEX: 26.75 KG/M2 | HEART RATE: 82 BPM | RESPIRATION RATE: 14 BRPM | SYSTOLIC BLOOD PRESSURE: 149 MMHG | TEMPERATURE: 98.2 F

## 2018-11-05 VITALS — DIASTOLIC BLOOD PRESSURE: 55 MMHG | OXYGEN SATURATION: 96 % | SYSTOLIC BLOOD PRESSURE: 101 MMHG

## 2018-11-05 DIAGNOSIS — G89.18 POST-OP PAIN: Primary | ICD-10-CM

## 2018-11-05 PROCEDURE — 2500000003 HC RX 250 WO HCPCS: Performed by: NURSE ANESTHETIST, CERTIFIED REGISTERED

## 2018-11-05 PROCEDURE — 7100000031 HC ASPR PHASE II RECOVERY - ADDTL 15 MIN: Performed by: PODIATRIST

## 2018-11-05 PROCEDURE — 6360000002 HC RX W HCPCS: Performed by: PODIATRIST

## 2018-11-05 PROCEDURE — 3700000000 HC ANESTHESIA ATTENDED CARE: Performed by: PODIATRIST

## 2018-11-05 PROCEDURE — 3600000003 HC SURGERY LEVEL 3 BASE: Performed by: PODIATRIST

## 2018-11-05 PROCEDURE — 28104 REMOVAL OF FOOT LESION: CPT | Performed by: PODIATRIST

## 2018-11-05 PROCEDURE — 2500000003 HC RX 250 WO HCPCS: Performed by: PODIATRIST

## 2018-11-05 PROCEDURE — 2709999900 HC NON-CHARGEABLE SUPPLY: Performed by: PODIATRIST

## 2018-11-05 PROCEDURE — 2580000003 HC RX 258: Performed by: ANESTHESIOLOGY

## 2018-11-05 PROCEDURE — 7100000030 HC ASPR PHASE II RECOVERY - FIRST 15 MIN: Performed by: PODIATRIST

## 2018-11-05 PROCEDURE — 7100000000 HC PACU RECOVERY - FIRST 15 MIN: Performed by: PODIATRIST

## 2018-11-05 PROCEDURE — 7100000001 HC PACU RECOVERY - ADDTL 15 MIN: Performed by: PODIATRIST

## 2018-11-05 PROCEDURE — 3700000001 HC ADD 15 MINUTES (ANESTHESIA): Performed by: PODIATRIST

## 2018-11-05 PROCEDURE — 2580000003 HC RX 258: Performed by: NURSE ANESTHETIST, CERTIFIED REGISTERED

## 2018-11-05 PROCEDURE — 3600000013 HC SURGERY LEVEL 3 ADDTL 15MIN: Performed by: PODIATRIST

## 2018-11-05 PROCEDURE — 6360000002 HC RX W HCPCS: Performed by: NURSE ANESTHETIST, CERTIFIED REGISTERED

## 2018-11-05 RX ORDER — SODIUM CHLORIDE 0.9 % (FLUSH) 0.9 %
10 SYRINGE (ML) INJECTION EVERY 12 HOURS SCHEDULED
Status: DISCONTINUED | OUTPATIENT
Start: 2018-11-05 | End: 2018-11-05 | Stop reason: HOSPADM

## 2018-11-05 RX ORDER — DIPHENHYDRAMINE HYDROCHLORIDE 50 MG/ML
12.5 INJECTION INTRAMUSCULAR; INTRAVENOUS
Status: DISCONTINUED | OUTPATIENT
Start: 2018-11-05 | End: 2018-11-05 | Stop reason: HOSPADM

## 2018-11-05 RX ORDER — LABETALOL HYDROCHLORIDE 5 MG/ML
5 INJECTION, SOLUTION INTRAVENOUS EVERY 10 MIN PRN
Status: DISCONTINUED | OUTPATIENT
Start: 2018-11-05 | End: 2018-11-05 | Stop reason: HOSPADM

## 2018-11-05 RX ORDER — OXYCODONE HYDROCHLORIDE AND ACETAMINOPHEN 5; 325 MG/1; MG/1
1 TABLET ORAL EVERY 4 HOURS PRN
Status: DISCONTINUED | OUTPATIENT
Start: 2018-11-05 | End: 2018-11-05 | Stop reason: HOSPADM

## 2018-11-05 RX ORDER — MEPERIDINE HYDROCHLORIDE 50 MG/ML
12.5 INJECTION INTRAMUSCULAR; INTRAVENOUS; SUBCUTANEOUS EVERY 5 MIN PRN
Status: DISCONTINUED | OUTPATIENT
Start: 2018-11-05 | End: 2018-11-05 | Stop reason: HOSPADM

## 2018-11-05 RX ORDER — AMOXICILLIN AND CLAVULANATE POTASSIUM 875; 125 MG/1; MG/1
1 TABLET, FILM COATED ORAL
COMMUNITY
Start: 2018-11-03 | End: 2018-11-20 | Stop reason: ALTCHOICE

## 2018-11-05 RX ORDER — SODIUM CHLORIDE 0.9 % (FLUSH) 0.9 %
10 SYRINGE (ML) INJECTION PRN
Status: DISCONTINUED | OUTPATIENT
Start: 2018-11-05 | End: 2018-11-05 | Stop reason: HOSPADM

## 2018-11-05 RX ORDER — MORPHINE SULFATE 2 MG/ML
2 INJECTION, SOLUTION INTRAMUSCULAR; INTRAVENOUS EVERY 5 MIN PRN
Status: DISCONTINUED | OUTPATIENT
Start: 2018-11-05 | End: 2018-11-05 | Stop reason: HOSPADM

## 2018-11-05 RX ORDER — PROPOFOL 10 MG/ML
INJECTION, EMULSION INTRAVENOUS PRN
Status: DISCONTINUED | OUTPATIENT
Start: 2018-11-05 | End: 2018-11-05 | Stop reason: SDUPTHER

## 2018-11-05 RX ORDER — KETAMINE HYDROCHLORIDE 50 MG/ML
INJECTION, SOLUTION, CONCENTRATE INTRAMUSCULAR; INTRAVENOUS PRN
Status: DISCONTINUED | OUTPATIENT
Start: 2018-11-05 | End: 2018-11-05 | Stop reason: SDUPTHER

## 2018-11-05 RX ORDER — LIDOCAINE HYDROCHLORIDE 10 MG/ML
1 INJECTION, SOLUTION EPIDURAL; INFILTRATION; INTRACAUDAL; PERINEURAL
Status: DISCONTINUED | OUTPATIENT
Start: 2018-11-05 | End: 2018-11-05 | Stop reason: HOSPADM

## 2018-11-05 RX ORDER — SODIUM CHLORIDE, SODIUM LACTATE, POTASSIUM CHLORIDE, CALCIUM CHLORIDE 600; 310; 30; 20 MG/100ML; MG/100ML; MG/100ML; MG/100ML
INJECTION, SOLUTION INTRAVENOUS CONTINUOUS
Status: DISCONTINUED | OUTPATIENT
Start: 2018-11-05 | End: 2018-11-05 | Stop reason: HOSPADM

## 2018-11-05 RX ORDER — SODIUM CHLORIDE, SODIUM LACTATE, POTASSIUM CHLORIDE, CALCIUM CHLORIDE 600; 310; 30; 20 MG/100ML; MG/100ML; MG/100ML; MG/100ML
INJECTION, SOLUTION INTRAVENOUS CONTINUOUS PRN
Status: DISCONTINUED | OUTPATIENT
Start: 2018-11-05 | End: 2018-11-05 | Stop reason: SDUPTHER

## 2018-11-05 RX ORDER — ONDANSETRON 2 MG/ML
4 INJECTION INTRAMUSCULAR; INTRAVENOUS
Status: DISCONTINUED | OUTPATIENT
Start: 2018-11-05 | End: 2018-11-05 | Stop reason: HOSPADM

## 2018-11-05 RX ORDER — PROPOFOL 10 MG/ML
INJECTION, EMULSION INTRAVENOUS CONTINUOUS PRN
Status: DISCONTINUED | OUTPATIENT
Start: 2018-11-05 | End: 2018-11-05 | Stop reason: SDUPTHER

## 2018-11-05 RX ORDER — CEFAZOLIN SODIUM 1 G/3ML
INJECTION, POWDER, FOR SOLUTION INTRAMUSCULAR; INTRAVENOUS PRN
Status: DISCONTINUED | OUTPATIENT
Start: 2018-11-05 | End: 2018-11-05 | Stop reason: SDUPTHER

## 2018-11-05 RX ORDER — MIDAZOLAM HYDROCHLORIDE 1 MG/ML
INJECTION INTRAMUSCULAR; INTRAVENOUS PRN
Status: DISCONTINUED | OUTPATIENT
Start: 2018-11-05 | End: 2018-11-05 | Stop reason: SDUPTHER

## 2018-11-05 RX ORDER — HYDROCODONE BITARTRATE AND ACETAMINOPHEN 5; 325 MG/1; MG/1
1 TABLET ORAL EVERY 6 HOURS PRN
Qty: 28 TABLET | Refills: 0 | Status: SHIPPED | OUTPATIENT
Start: 2018-11-05 | End: 2018-11-12

## 2018-11-05 RX ORDER — BUPIVACAINE HYDROCHLORIDE 5 MG/ML
INJECTION, SOLUTION EPIDURAL; INTRACAUDAL PRN
Status: DISCONTINUED | OUTPATIENT
Start: 2018-11-05 | End: 2018-11-05 | Stop reason: HOSPADM

## 2018-11-05 RX ORDER — FENTANYL CITRATE 50 UG/ML
INJECTION, SOLUTION INTRAMUSCULAR; INTRAVENOUS PRN
Status: DISCONTINUED | OUTPATIENT
Start: 2018-11-05 | End: 2018-11-05 | Stop reason: SDUPTHER

## 2018-11-05 RX ADMIN — KETAMINE HYDROCHLORIDE 30 MG: 50 INJECTION, SOLUTION INTRAMUSCULAR; INTRAVENOUS at 09:05

## 2018-11-05 RX ADMIN — PROPOFOL 100 MG: 10 INJECTION, EMULSION INTRAVENOUS at 09:03

## 2018-11-05 RX ADMIN — FENTANYL CITRATE 50 MCG: 50 INJECTION, SOLUTION INTRAMUSCULAR; INTRAVENOUS at 09:57

## 2018-11-05 RX ADMIN — SODIUM CHLORIDE, POTASSIUM CHLORIDE, SODIUM LACTATE AND CALCIUM CHLORIDE: 600; 310; 30; 20 INJECTION, SOLUTION INTRAVENOUS at 08:52

## 2018-11-05 RX ADMIN — MIDAZOLAM 2 MG: 1 INJECTION INTRAMUSCULAR; INTRAVENOUS at 08:58

## 2018-11-05 RX ADMIN — PROPOFOL 100 MCG/KG/MIN: 10 INJECTION, EMULSION INTRAVENOUS at 09:03

## 2018-11-05 RX ADMIN — SODIUM CHLORIDE, POTASSIUM CHLORIDE, SODIUM LACTATE AND CALCIUM CHLORIDE: 600; 310; 30; 20 INJECTION, SOLUTION INTRAVENOUS at 07:43

## 2018-11-05 RX ADMIN — CEFAZOLIN 2000 MG: 1 INJECTION, POWDER, FOR SOLUTION INTRAMUSCULAR; INTRAVENOUS at 09:11

## 2018-11-05 RX ADMIN — FENTANYL CITRATE 50 MCG: 50 INJECTION, SOLUTION INTRAMUSCULAR; INTRAVENOUS at 09:03

## 2018-11-05 ASSESSMENT — PULMONARY FUNCTION TESTS
PIF_VALUE: 1
PIF_VALUE: 0
PIF_VALUE: 1

## 2018-11-05 ASSESSMENT — PAIN SCALES - GENERAL
PAINLEVEL_OUTOF10: 0

## 2018-11-05 ASSESSMENT — ENCOUNTER SYMPTOMS
SHORTNESS OF BREATH: 0
STRIDOR: 0

## 2018-11-05 ASSESSMENT — PAIN DESCRIPTION - PAIN TYPE: TYPE: SURGICAL PAIN

## 2018-11-05 ASSESSMENT — PAIN - FUNCTIONAL ASSESSMENT: PAIN_FUNCTIONAL_ASSESSMENT: 0-10

## 2018-11-05 ASSESSMENT — LIFESTYLE VARIABLES: SMOKING_STATUS: 0

## 2018-11-05 ASSESSMENT — PAIN DESCRIPTION - LOCATION: LOCATION: FOOT

## 2018-11-05 ASSESSMENT — PAIN DESCRIPTION - ORIENTATION: ORIENTATION: LEFT

## 2018-11-05 NOTE — H&P (VIEW-ONLY)
HISTORY and [address] [facility] [address]       NAME:  Juan Collado  MRN: [id number]   YOB: 1950   Date: 10/24/2018   Age: 77 y.o. Gender: female       COMPLAINT AND PRESENT HISTORY:     Juan Collado is 77 y.o.,  female, here for EXOSTECTOMY DORSAL of LEFT FOOT. Patient has a hx of Primary osteoarthritis of left foot and  Bone spur of left foot . Patient has left pain and she rates it   @ 5/10 on average. .   Standing,  Weight bearing  and walking  without shoes aggravates it more. Patient has used aspirin and wearing tennis shoes to help with sx. The symptoms started 10 years . No recent falls or trauma. No redness, just tenderness and swelling. Pt denies any other symptoms. PAST MEDICAL HISTORY     Past Medical History:   Diagnosis Date    Bone spur of left foot     Breast cancer ([facility] [address] 1998    left with reconstruction, no chemo or radiation    Colon polyp     Depression     Diverticulosis     Elevated liver enzymes     GERD (gastroesophageal reflux disease)     Hemorrhoids     Hyperlipidemia     Liver hemangioma     Migraine headache     Osteopenia     Overweight (BMI 25.0-29.9) 5/11/2015    Restless legs syndrome (RLS)     on Mirapex    Tubular adenoma        Pt denies any history of Diabetes mellitus type 2, hypertension, stroke, heart disease, COPD, Asthma,  Seizures,Thyroid disease, Kidney Disease, Hepatitis, TB,  or Substance abuse.     SURGICAL HISTORY       Past Surgical History:   Procedure Laterality Date    BREAST BIOPSY Left     cancer found    BREAST RECONSTRUCTION Left     using abdominal tissue    CATARACT REMOVAL WITH IMPLANT Bilateral     COLONOSCOPY      COLONOSCOPY  2 10 15    DIVERTICULOSIS AND HEMORRHOIDS , TUBULAR ADENOMA     COLONOSCOPY N/A 2/21/2018    COLONOSCOPY WITH BIOPSY performed by Maxwell Caba MD at [facility] Left     remainder of breast was removed because the margins were not clear on No pain, stiffness or masses. Cardiovascular/Respiratory system:  No chest pain, palpitation or shortness of breath. Gastrointestinal tract: No abdominal pain, nausea, vomiting, diarrhea or constipation. Genitourinary:  No burning on micturition. No hesitancy, urgency, frequency or discoloration of urine. Locomotor:  See HPI. Neuropsychiatric:  No referable complaints. GENERAL PHYSICAL EXAM:     Vitals: BP (!) 152/83   Pulse 85   Temp 97.9 °F (36.6 °C) (Oral)   Resp 16   Ht 5' 3\" (1.6 m)   Wt 151 lb (68.5 kg)   SpO2 99%   BMI 26.75 kg/m²  Body mass index is 26.75 kg/m². GENERAL APPEARANCE:   Shanika Chaidez is 77 y.o.,  female, mildly obese, nourished, conscious, alert. Does not appear to be distress or pain at this time. SKIN:  Warm, dry, no cyanosis or jaundice. HEAD:  Normocephalic, atraumatic, no swelling or tenderness. EYES:  Pupils equal, reactive to light. EARS:  No discharge, no marked hearing loss. NOSE:  No rhinorrhea, epistaxis or septal deformity. THROAT:  Not congested. No ulceration bleeding or discharge. NECK:  No stiffness, trachea central.  No palpable masses or L.N.                 CHEST:  Symmetrical and equal on expansion. HEART:  RRR S1 > S2. No audible murmurs or gallops. LUNGS:  Equal on expansion, normal breath sounds. No adventitious sounds. ABDOMEN:  Mildly obese. Soft on palpation. No localized tenderness. No guarding or rigidity. No palpable hepatosplenomegaly. LYMPHATICS:  No palpable cervical lymphadenopathy. LOCOMOTOR, BACK AND SPINE:  No tenderness or deformities. EXTREMITIES:  Symmetrical, no pretibial edema. Navdeeps sign negative. No discoloration or ulcerations.     NEUROLOGIC: The patient is conscious, alert, oriented, No apparent focal sensory or motor deficits.                                                                                      PROVISIONAL DIAGNOSES / SURGERY:           Primary osteoarthritis of left foot          Bone spur of left foot     EXOSTECTOMY DORSAL FOOT-Left    Patient Active Problem List    Diagnosis Date Noted    Diarrhea 08/10/2017    Restless leg syndrome, uncontrolled 07/27/2016    Snoring 05/10/2016    Exercise counseling 05/10/2016    Diverticulosis of intestine without bleeding 03/14/2016    Gastroesophageal reflux disease without esophagitis 03/14/2016    Hemorrhoids     Tubular adenoma     Migraine without aura and without status migrainosus, not intractable 11/10/2015    Liver hemangioma     Elevated liver enzymes     Hyperlipidemia with target LDL less than 130 05/11/2015    Migraine headache 05/11/2015    BMI 25.0-25.9,adult 05/11/2015    GERD (gastroesophageal reflux disease)     Diverticulosis     Colon polyp            ILIANA ROE, ANNIE - CNP on 10/24/2018 at 10:17 AM

## 2018-11-05 NOTE — ANESTHESIA PRE PROCEDURE
Intravenous PRN Garland Wallace MD           Allergies:     Allergies   Allergen Reactions    Darvon [Propoxyphene] Nausea And Vomiting and Other (See Comments)     Severe headaches    Morphine Nausea And Vomiting and Other (See Comments)     Severe headaches       Problem List:    Patient Active Problem List   Diagnosis Code    GERD (gastroesophageal reflux disease) K21.9    Diverticulosis K57.90    Colon polyp K63.5    Hyperlipidemia with target LDL less than 130 E78.5    Migraine headache G43.909    BMI 25.0-25.9,adult Z68.25    Liver hemangioma D18.03    Elevated liver enzymes R74.8    Migraine without aura and without status migrainosus, not intractable G43.009    Hemorrhoids K64.9    Tubular adenoma D36.9    Diverticulosis of intestine without bleeding K57.90    Gastroesophageal reflux disease without esophagitis K21.9    Snoring R06.83    Exercise counseling Z71.82    Restless leg syndrome, uncontrolled G25.81    Diarrhea R19.7       Past Medical History:        Diagnosis Date    Bone spur of left foot     Breast cancer ([facility] [address] 1998    left with reconstruction, no chemo or radiation    Colon polyp     Depression     Diverticulosis     Elevated liver enzymes     GERD (gastroesophageal reflux disease)     Hemorrhoids     Hyperlipidemia     Liver hemangioma     Migraine headache     Osteopenia     Overweight (BMI 25.0-29.9) 5/11/2015    Restless legs syndrome (RLS)     on Mirapex    Tubular adenoma        Past Surgical History:        Procedure Laterality Date    BREAST BIOPSY Left     cancer found    BREAST RECONSTRUCTION Left     using abdominal tissue    CATARACT REMOVAL WITH IMPLANT Bilateral     COLONOSCOPY      COLONOSCOPY  2 10 15    DIVERTICULOSIS AND HEMORRHOIDS , TUBULAR ADENOMA     COLONOSCOPY N/A 2/21/2018    COLONOSCOPY WITH BIOPSY performed by Nicholes Boas, MD at [facility] Left     remainder of breast was removed because the margins were not clear on the partial mastectomy    MASTECTOMY, PARTIAL Left     for cancer    SPINE SURGERY      L3,4,5, fusion    TUBAL LIGATION         Social History:    Social History   Substance Use Topics    Smoking status: Former Smoker     Packs/day: 0.50     Years: 3.00     Quit date: 2008    Smokeless tobacco: Never Used    Alcohol use Yes      Comment: 1-2 per week                                Counseling given: Not Answered      Vital Signs (Current):   Vitals:    11/05/18 0726 11/05/18 0734   BP:  (!) 144/67   Pulse:  101   Resp:  16   Temp:  98.4 °F (36.9 °C)   TempSrc:  Oral   SpO2:  98%   Weight: 151 lb (68.5 kg)    Height: 5' 3\" (1.6 m)                                               BP Readings from Last 3 Encounters:   11/05/18 (!) 144/67   10/24/18 (!) 152/83   10/03/18 136/86       NPO Status: Time of last liquid consumption: 1900                        Time of last solid consumption: 1900                        Date of last liquid consumption: 11/04/18                        Date of last solid food consumption: 11/04/18    BMI:   Wt Readings from Last 3 Encounters:   11/05/18 151 lb (68.5 kg)   10/24/18 151 lb (68.5 kg)   10/03/18 150 lb (68 kg)     Body mass index is 26.75 kg/m².     CBC:   Lab Results   Component Value Date    WBC 7.3 10/24/2018    RBC 4.57 10/24/2018    RBC 4.46 06/05/2012    HGB 13.9 10/24/2018    HCT 41.6 10/24/2018    MCV 91.1 10/24/2018    RDW 13.5 10/24/2018     10/24/2018     06/05/2012     LR    CMP:   Lab Results   Component Value Date     10/24/2018    K 4.8 10/24/2018     10/24/2018    CO2 24 10/24/2018    BUN 19 10/24/2018    CREATININE 0.78 10/24/2018    GFRAA >60 10/24/2018    LABGLOM >60 10/24/2018    GLUCOSE 102 10/24/2018    GLUCOSE 122 06/05/2012    PROT 6.7 10/29/2018    CALCIUM 9.9 10/24/2018    BILITOT 0.27 10/29/2018    ALKPHOS 69 10/29/2018    AST 18 10/29/2018    ALT 21 10/29/2018       POC Tests: No results for input(s):

## 2018-11-05 NOTE — H&P
it   @ 5/10 on average. .   Standing,  Weight bearing  and walking  without shoes aggravates it more. Patient has used aspirin and wearing tennis shoes to help with sx. The symptoms started 10 years . No recent falls or trauma. No redness, just tenderness and swelling.    Pt denies any other symptoms.     PAST MEDICAL HISTORY      Past Medical History        Past Medical History:   Diagnosis Date    Bone spur of left foot      Breast cancer ([address] 1998     left with reconstruction, no chemo or radiation    Colon polyp      Depression      Diverticulosis      Elevated liver enzymes      GERD (gastroesophageal reflux disease)      Hemorrhoids      Hyperlipidemia      Liver hemangioma      Migraine headache      Osteopenia      Overweight (BMI 25.0-29.9) 5/11/2015    Restless legs syndrome (RLS)       on Mirapex    Tubular adenoma              Pt denies any history of Diabetes mellitus type 2, hypertension, stroke, heart disease, COPD, Asthma,  Seizures,Thyroid disease, Kidney Disease, Hepatitis, TB,  or Substance abuse.     SURGICAL HISTORY        Past Surgical History         Past Surgical History:   Procedure Laterality Date    BREAST BIOPSY Left       cancer found    BREAST RECONSTRUCTION Left       using abdominal tissue    CATARACT REMOVAL WITH IMPLANT Bilateral      COLONOSCOPY        COLONOSCOPY   2 10 15     DIVERTICULOSIS AND HEMORRHOIDS , TUBULAR ADENOMA     COLONOSCOPY N/A 2/21/2018     COLONOSCOPY WITH BIOPSY performed by Jelani Monroe MD at [facility] Left       remainder of breast was removed because the margins were not clear on the partial mastectomy    MASTECTOMY, PARTIAL Left       for cancer    SPINE SURGERY         L3,4,5, fusion    TUBAL LIGATION                FAMILY HISTORY        Family History         Family History   Problem Relation Age of Onset    Ovarian Cancer Mother      Heart Attack Father      Breast Cancer Maternal Grandmother              SOCIAL HISTORY        Social History   Social History            Social History    Marital status:        Spouse name: N/A    Number of children: N/A    Years of education: N/A             Social History Main Topics    Smoking status: Former Smoker       Packs/day: 0.50       Years: 3.00       Quit date: 2008    Smokeless tobacco: Never Used    Alcohol use Yes         Comment: 1-2 per week    Drug use: No    Sexual activity: Not Asked           Other Topics Concern    None          Social History Narrative    None               REVIEW OF SYSTEMS             Allergies   Allergen Reactions    Darvon [Propoxyphene] Nausea And Vomiting and Other (See Comments)       Severe headaches    Morphine Nausea And Vomiting and Other (See Comments)       Severe headaches                Current Outpatient Prescriptions on File Prior to Encounter   Medication Sig Dispense Refill    pramipexole (MIRAPEX) 0.5 MG tablet TAKE ONE TABLET BY MOUTH ONCE NIGHTLY 90 tablet 3    citalopram (CELEXA) 20 MG tablet TAKE ONE TABLET BY MOUTH ONCE DAILY 90 tablet 3    atorvastatin (LIPITOR) 20 MG tablet Take 1 tablet by mouth daily 90 tablet 3    SUMAtriptan (IMITREX) 50 MG tablet Take at onset of headache; may repeat in 2 hours; no more than 2 in 24 hours. 9 tablet 11    Multiple Vitamins-Minerals (THERAPEUTIC MULTIVITAMIN-MINERALS) tablet Take 1 tablet by mouth daily.        omeprazole (PRILOSEC) 20 MG capsule Take 20 mg by mouth daily.            No current facility-administered medications on file prior to encounter.          General health:  Fairly good. No fever or chills. Skin:  No itching, redness or rash. HEENT:  No headache, epistaxis or sore throat. Neck:  No pain, stiffness or masses. Cardiovascular/Respiratory system:  No chest pain, palpitation or shortness of breath.                          Gastrointestinal tract: No abdominal pain, DIAGNOSES / SURGERY:            Primary osteoarthritis of left foot           Bone spur of left foot      EXOSTECTOMY DORSAL FOOT-Left          Patient Active Problem List     Diagnosis Date Noted    Diarrhea 08/10/2017    Restless leg syndrome, uncontrolled 07/27/2016    Snoring 05/10/2016    Exercise counseling 05/10/2016    Diverticulosis of intestine without bleeding 03/14/2016    Gastroesophageal reflux disease without esophagitis 03/14/2016    Hemorrhoids      Tubular adenoma      Migraine without aura and without status migrainosus, not intractable 11/10/2015    Liver hemangioma      Elevated liver enzymes      Hyperlipidemia with target LDL less than 130 05/11/2015    Migraine headache 05/11/2015    BMI 25.0-25.9,adult 05/11/2015    GERD (gastroesophageal reflux disease)      Diverticulosis      Colon polyp              ANNIE DAVIES - CNP on 10/24/2018 at 10:17 AM         Cosigned by: Celestino Gamez DPM at 10/24/2018  4:36 PM

## 2018-11-08 ENCOUNTER — OFFICE VISIT (OUTPATIENT)
Dept: PODIATRY | Age: 68
End: 2018-11-08

## 2018-11-08 VITALS — HEIGHT: 63 IN | WEIGHT: 150 LBS | BODY MASS INDEX: 26.58 KG/M2

## 2018-11-08 DIAGNOSIS — M77.52 BONE SPUR OF LEFT FOOT: ICD-10-CM

## 2018-11-08 DIAGNOSIS — Z98.890 POST-OPERATIVE STATE: ICD-10-CM

## 2018-11-08 DIAGNOSIS — M19.072 PRIMARY OSTEOARTHRITIS OF LEFT FOOT: Primary | ICD-10-CM

## 2018-11-08 PROCEDURE — 99024 POSTOP FOLLOW-UP VISIT: CPT | Performed by: PODIATRIST

## 2018-11-20 ENCOUNTER — OFFICE VISIT (OUTPATIENT)
Dept: PODIATRY | Age: 68
End: 2018-11-20

## 2018-11-20 VITALS — BODY MASS INDEX: 26.58 KG/M2 | WEIGHT: 150 LBS | HEIGHT: 63 IN

## 2018-11-20 DIAGNOSIS — M77.52 BONE SPUR OF LEFT FOOT: ICD-10-CM

## 2018-11-20 DIAGNOSIS — Z98.890 POST-OPERATIVE STATE: ICD-10-CM

## 2018-11-20 DIAGNOSIS — M19.072 PRIMARY OSTEOARTHRITIS OF LEFT FOOT: Primary | ICD-10-CM

## 2018-11-20 PROCEDURE — 99024 POSTOP FOLLOW-UP VISIT: CPT | Performed by: PODIATRIST

## 2018-11-20 NOTE — PROGRESS NOTES
[address] [facility]  Post Op note  Chief Complaint   Patient presents with    Post-Op Check     post op left foot, suture removal       Amelia Castle is a 77y.o. year old female who is 2 weeks post op from foot surgery. Type: exostectomy dorsal left foot. Vital signs are stable. Pain level is 0. Patient denies N/V/F/C. Patient has been compliant with postoperative instructions. Review of Systems    Vascular: DP and PT pulses palpable 2/4, Right Foot and 2/4 on the Left Foot. CFT <3 seconds, Right Foot and <3 seconds on the Left Foot. Edema is absent,  Right Foot and present on the Left Foot. Neurological:   Sensation present  to light touch to level of digits, both feet. Musculoskeletal:   Muscle strength is 5/5 on the Right Foot and 5/5 on the Left Foot. Structural deformities are absent on the Right Foot and absent on the Left Foot. Integument:  Warm, dry, supple both feet. Incisions are healing well. Wound dehiscence is absent. Infection is absent. Assesment : Post operative progress gradually improving. Diagnosis Orders   1. Primary osteoarthritis of left foot     2. Bone spur of left foot     3. Post-operative state           Plan: Sutures removed. OK to shower, Tubi , Ice and elevation as directed. No orders of the defined types were placed in this encounter. Follow up 2week(s).

## 2018-12-03 ENCOUNTER — OFFICE VISIT (OUTPATIENT)
Dept: PODIATRY | Age: 68
End: 2018-12-03

## 2018-12-03 DIAGNOSIS — Z98.890 POST-OPERATIVE STATE: ICD-10-CM

## 2018-12-03 DIAGNOSIS — M77.52 BONE SPUR OF LEFT FOOT: ICD-10-CM

## 2018-12-03 DIAGNOSIS — M19.072 PRIMARY OSTEOARTHRITIS OF LEFT FOOT: Primary | ICD-10-CM

## 2018-12-03 PROCEDURE — 99024 POSTOP FOLLOW-UP VISIT: CPT | Performed by: PODIATRIST

## 2018-12-31 ENCOUNTER — HOSPITAL ENCOUNTER (EMERGENCY)
Age: 68
Discharge: HOME | End: 2018-12-31
Attending: EMERGENCY MEDICINE
Payer: MEDICARE

## 2018-12-31 VITALS
BODY MASS INDEX: 26.58 KG/M2 | HEART RATE: 94 BPM | RESPIRATION RATE: 18 BRPM | WEIGHT: 150 LBS | TEMPERATURE: 98.2 F | DIASTOLIC BLOOD PRESSURE: 87 MMHG | HEIGHT: 63 IN | SYSTOLIC BLOOD PRESSURE: 150 MMHG | OXYGEN SATURATION: 93 %

## 2018-12-31 DIAGNOSIS — R60.0 LEG EDEMA: ICD-10-CM

## 2018-12-31 DIAGNOSIS — M79.605 LEFT LEG PAIN: Primary | ICD-10-CM

## 2018-12-31 LAB
ABSOLUTE EOS #: 0 K/UL (ref 0–0.4)
ABSOLUTE IMMATURE GRANULOCYTE: ABNORMAL K/UL (ref 0–0.3)
ABSOLUTE LYMPH #: 2.3 K/UL (ref 1–4.8)
ABSOLUTE MONO #: 0.9 K/UL (ref 0.1–1.3)
ALBUMIN SERPL-MCNC: 4.1 G/DL (ref 3.5–5.2)
ALBUMIN/GLOBULIN RATIO: ABNORMAL (ref 1–2.5)
ALP BLD-CCNC: 73 U/L (ref 35–104)
ALT SERPL-CCNC: 18 U/L (ref 5–33)
ANION GAP SERPL CALCULATED.3IONS-SCNC: 14 MMOL/L (ref 9–17)
AST SERPL-CCNC: 14 U/L
BASOPHILS # BLD: 0 % (ref 0–2)
BASOPHILS ABSOLUTE: 0 K/UL (ref 0–0.2)
BILIRUB SERPL-MCNC: 0.62 MG/DL (ref 0.3–1.2)
BUN BLDV-MCNC: 11 MG/DL (ref 8–23)
BUN/CREAT BLD: ABNORMAL (ref 9–20)
CALCIUM SERPL-MCNC: 8.8 MG/DL (ref 8.6–10.4)
CHLORIDE BLD-SCNC: 105 MMOL/L (ref 98–107)
CO2: 21 MMOL/L (ref 20–31)
CREAT SERPL-MCNC: 0.66 MG/DL (ref 0.5–0.9)
DIFFERENTIAL TYPE: ABNORMAL
EOSINOPHILS RELATIVE PERCENT: 0 % (ref 0–4)
GFR AFRICAN AMERICAN: >60 ML/MIN
GFR NON-AFRICAN AMERICAN: >60 ML/MIN
GFR SERPL CREATININE-BSD FRML MDRD: ABNORMAL ML/MIN/{1.73_M2}
GFR SERPL CREATININE-BSD FRML MDRD: ABNORMAL ML/MIN/{1.73_M2}
GLUCOSE BLD-MCNC: 110 MG/DL (ref 70–99)
HCT VFR BLD CALC: 38.1 % (ref 36–46)
HEMOGLOBIN: 12.7 G/DL (ref 12–16)
IMMATURE GRANULOCYTES: ABNORMAL %
LYMPHOCYTES # BLD: 25 % (ref 24–44)
MCH RBC QN AUTO: 29.9 PG (ref 26–34)
MCHC RBC AUTO-ENTMCNC: 33.2 G/DL (ref 31–37)
MCV RBC AUTO: 90 FL (ref 80–100)
MONOCYTES # BLD: 10 % (ref 1–7)
NRBC AUTOMATED: ABNORMAL PER 100 WBC
PDW BLD-RTO: 13.5 % (ref 11.5–14.9)
PLATELET # BLD: 228 K/UL (ref 150–450)
PLATELET ESTIMATE: ABNORMAL
PMV BLD AUTO: 8.2 FL (ref 6–12)
POTASSIUM SERPL-SCNC: 3.2 MMOL/L (ref 3.7–5.3)
RBC # BLD: 4.23 M/UL (ref 4–5.2)
RBC # BLD: ABNORMAL 10*6/UL
SEG NEUTROPHILS: 65 % (ref 36–66)
SEGMENTED NEUTROPHILS ABSOLUTE COUNT: 5.9 K/UL (ref 1.3–9.1)
SODIUM BLD-SCNC: 140 MMOL/L (ref 135–144)
TOTAL PROTEIN: 6.3 G/DL (ref 6.4–8.3)
WBC # BLD: 9.1 K/UL (ref 3.5–11)
WBC # BLD: ABNORMAL 10*3/UL

## 2018-12-31 PROCEDURE — 80053 COMPREHEN METABOLIC PANEL: CPT

## 2018-12-31 PROCEDURE — 93971 EXTREMITY STUDY: CPT

## 2018-12-31 PROCEDURE — 85025 COMPLETE CBC W/AUTO DIFF WBC: CPT

## 2018-12-31 PROCEDURE — 99284 EMERGENCY DEPT VISIT MOD MDM: CPT

## 2018-12-31 PROCEDURE — 36415 COLL VENOUS BLD VENIPUNCTURE: CPT

## 2018-12-31 RX ORDER — FUROSEMIDE 20 MG/1
20 TABLET ORAL DAILY
Qty: 5 TABLET | Refills: 0 | Status: SHIPPED | OUTPATIENT
Start: 2018-12-31 | End: 2019-01-18

## 2018-12-31 ASSESSMENT — PAIN DESCRIPTION - LOCATION: LOCATION: KNEE

## 2018-12-31 ASSESSMENT — PAIN DESCRIPTION - DESCRIPTORS: DESCRIPTORS: SHARP

## 2018-12-31 ASSESSMENT — PAIN SCALES - GENERAL: PAINLEVEL_OUTOF10: 4

## 2018-12-31 ASSESSMENT — PAIN DESCRIPTION - PAIN TYPE: TYPE: ACUTE PAIN

## 2018-12-31 ASSESSMENT — PAIN DESCRIPTION - ORIENTATION: ORIENTATION: LEFT

## 2019-01-18 ENCOUNTER — HOSPITAL ENCOUNTER (OUTPATIENT)
Age: 69
Discharge: HOME | End: 2019-01-18
Payer: MEDICARE

## 2019-01-18 DIAGNOSIS — E78.5 HYPERLIPIDEMIA WITH TARGET LDL LESS THAN 130: Chronic | ICD-10-CM

## 2019-01-18 DIAGNOSIS — R60.0 EDEMA, LOWER EXTREMITY: ICD-10-CM

## 2019-01-18 LAB
ALBUMIN SERPL-MCNC: 4.2 G/DL (ref 3.5–5.2)
ALBUMIN/GLOBULIN RATIO: ABNORMAL (ref 1–2.5)
ALP BLD-CCNC: 75 U/L (ref 35–104)
ALT SERPL-CCNC: 19 U/L (ref 5–33)
ANION GAP SERPL CALCULATED.3IONS-SCNC: 13 MMOL/L (ref 9–17)
AST SERPL-CCNC: 18 U/L
BILIRUB SERPL-MCNC: 0.49 MG/DL (ref 0.3–1.2)
BUN BLDV-MCNC: 11 MG/DL (ref 8–23)
BUN/CREAT BLD: ABNORMAL (ref 9–20)
CALCIUM SERPL-MCNC: 9 MG/DL (ref 8.6–10.4)
CHLORIDE BLD-SCNC: 105 MMOL/L (ref 98–107)
CHOLESTEROL/HDL RATIO: 3.4
CHOLESTEROL: 175 MG/DL
CO2: 24 MMOL/L (ref 20–31)
CREAT SERPL-MCNC: 0.66 MG/DL (ref 0.5–0.9)
GFR AFRICAN AMERICAN: >60 ML/MIN
GFR NON-AFRICAN AMERICAN: >60 ML/MIN
GFR SERPL CREATININE-BSD FRML MDRD: ABNORMAL ML/MIN/{1.73_M2}
GFR SERPL CREATININE-BSD FRML MDRD: ABNORMAL ML/MIN/{1.73_M2}
GLUCOSE BLD-MCNC: 109 MG/DL (ref 70–99)
HDLC SERPL-MCNC: 52 MG/DL
LDL CHOLESTEROL: 67 MG/DL (ref 0–130)
POTASSIUM SERPL-SCNC: 3.6 MMOL/L (ref 3.7–5.3)
SODIUM BLD-SCNC: 142 MMOL/L (ref 135–144)
TOTAL PROTEIN: 6.9 G/DL (ref 6.4–8.3)
TRIGL SERPL-MCNC: 280 MG/DL
URIC ACID: 3.8 MG/DL (ref 2.4–5.7)
VLDLC SERPL CALC-MCNC: ABNORMAL MG/DL (ref 1–30)

## 2019-01-18 PROCEDURE — 84550 ASSAY OF BLOOD/URIC ACID: CPT

## 2019-01-18 PROCEDURE — 36415 COLL VENOUS BLD VENIPUNCTURE: CPT

## 2019-01-18 PROCEDURE — 80053 COMPREHEN METABOLIC PANEL: CPT

## 2019-01-18 PROCEDURE — 80061 LIPID PANEL: CPT

## 2019-01-24 DIAGNOSIS — M25.562 LEFT KNEE PAIN, UNSPECIFIED CHRONICITY: Primary | ICD-10-CM

## 2019-02-06 ENCOUNTER — OFFICE VISIT (OUTPATIENT)
Dept: ORTHOPEDIC SURGERY | Age: 69
End: 2019-02-06
Payer: MEDICARE

## 2019-02-06 VITALS — HEART RATE: 95 BPM | OXYGEN SATURATION: 98 % | BODY MASS INDEX: 26.93 KG/M2 | HEIGHT: 63 IN | WEIGHT: 152 LBS

## 2019-02-06 DIAGNOSIS — M25.562 CHRONIC PAIN OF LEFT KNEE: Primary | ICD-10-CM

## 2019-02-06 DIAGNOSIS — G89.29 CHRONIC PAIN OF LEFT KNEE: Primary | ICD-10-CM

## 2019-02-06 PROCEDURE — 1101F PT FALLS ASSESS-DOCD LE1/YR: CPT | Performed by: ORTHOPAEDIC SURGERY

## 2019-02-06 PROCEDURE — G8482 FLU IMMUNIZE ORDER/ADMIN: HCPCS | Performed by: ORTHOPAEDIC SURGERY

## 2019-02-06 PROCEDURE — 99213 OFFICE O/P EST LOW 20 MIN: CPT | Performed by: ORTHOPAEDIC SURGERY

## 2019-02-06 PROCEDURE — G8417 CALC BMI ABV UP PARAM F/U: HCPCS | Performed by: ORTHOPAEDIC SURGERY

## 2019-02-06 PROCEDURE — 20610 DRAIN/INJ JOINT/BURSA W/O US: CPT | Performed by: ORTHOPAEDIC SURGERY

## 2019-02-06 PROCEDURE — G8400 PT W/DXA NO RESULTS DOC: HCPCS | Performed by: ORTHOPAEDIC SURGERY

## 2019-02-06 PROCEDURE — 4040F PNEUMOC VAC/ADMIN/RCVD: CPT | Performed by: ORTHOPAEDIC SURGERY

## 2019-02-06 PROCEDURE — 1090F PRES/ABSN URINE INCON ASSESS: CPT | Performed by: ORTHOPAEDIC SURGERY

## 2019-02-06 PROCEDURE — 1123F ACP DISCUSS/DSCN MKR DOCD: CPT | Performed by: ORTHOPAEDIC SURGERY

## 2019-02-06 PROCEDURE — 3017F COLORECTAL CA SCREEN DOC REV: CPT | Performed by: ORTHOPAEDIC SURGERY

## 2019-02-06 PROCEDURE — G8427 DOCREV CUR MEDS BY ELIG CLIN: HCPCS | Performed by: ORTHOPAEDIC SURGERY

## 2019-02-06 PROCEDURE — 1036F TOBACCO NON-USER: CPT | Performed by: ORTHOPAEDIC SURGERY

## 2019-02-06 RX ORDER — BUPIVACAINE HYDROCHLORIDE 5 MG/ML
2 INJECTION, SOLUTION PERINEURAL ONCE
Status: COMPLETED | OUTPATIENT
Start: 2019-02-06 | End: 2019-02-06

## 2019-02-06 RX ORDER — LIDOCAINE HYDROCHLORIDE 10 MG/ML
2 INJECTION, SOLUTION EPIDURAL; INFILTRATION; INTRACAUDAL; PERINEURAL ONCE
Status: COMPLETED | OUTPATIENT
Start: 2019-02-06 | End: 2019-02-06

## 2019-02-06 RX ORDER — HYDROCODONE BITARTRATE AND ACETAMINOPHEN 5; 325 MG/1; MG/1
1 TABLET ORAL NIGHTLY
COMMUNITY
End: 2019-03-11 | Stop reason: SDUPTHER

## 2019-02-06 RX ORDER — BETAMETHASONE SODIUM PHOSPHATE AND BETAMETHASONE ACETATE 3; 3 MG/ML; MG/ML
12 INJECTION, SUSPENSION INTRA-ARTICULAR; INTRALESIONAL; INTRAMUSCULAR; SOFT TISSUE ONCE
Status: COMPLETED | OUTPATIENT
Start: 2019-02-06 | End: 2019-02-06

## 2019-02-06 RX ADMIN — BETAMETHASONE SODIUM PHOSPHATE AND BETAMETHASONE ACETATE 12 MG: 3; 3 INJECTION, SUSPENSION INTRA-ARTICULAR; INTRALESIONAL; INTRAMUSCULAR; SOFT TISSUE at 12:10

## 2019-02-06 RX ADMIN — BUPIVACAINE HYDROCHLORIDE 10 MG: 5 INJECTION, SOLUTION PERINEURAL at 12:10

## 2019-02-06 RX ADMIN — LIDOCAINE HYDROCHLORIDE 2 ML: 10 INJECTION, SOLUTION EPIDURAL; INFILTRATION; INTRACAUDAL; PERINEURAL at 12:11

## 2019-02-08 ENCOUNTER — TELEPHONE (OUTPATIENT)
Dept: ORTHOPEDIC SURGERY | Age: 69
End: 2019-02-08

## 2019-02-08 DIAGNOSIS — S83.242A ACUTE MEDIAL MENISCUS TEAR OF LEFT KNEE, INITIAL ENCOUNTER: Primary | ICD-10-CM

## 2019-02-18 DIAGNOSIS — S83.241A ACUTE MEDIAL MENISCUS TEAR OF RIGHT KNEE, INITIAL ENCOUNTER: Primary | ICD-10-CM

## 2019-02-22 RX ORDER — HYDROCODONE BITARTRATE AND ACETAMINOPHEN 5; 325 MG/1; MG/1
1-2 TABLET ORAL EVERY 6 HOURS PRN
Qty: 40 TABLET | Refills: 0 | Status: SHIPPED | OUTPATIENT
Start: 2019-02-22 | End: 2019-03-01

## 2019-02-27 ENCOUNTER — HOSPITAL ENCOUNTER (OUTPATIENT)
Dept: MRI IMAGING | Facility: CLINIC | Age: 69
Discharge: HOME | End: 2019-03-01
Payer: MEDICARE

## 2019-02-27 DIAGNOSIS — S83.242A ACUTE MEDIAL MENISCUS TEAR OF LEFT KNEE, INITIAL ENCOUNTER: ICD-10-CM

## 2019-02-27 PROCEDURE — 73721 MRI JNT OF LWR EXTRE W/O DYE: CPT

## 2019-02-28 ENCOUNTER — TELEPHONE (OUTPATIENT)
Dept: ORTHOPEDIC SURGERY | Age: 69
End: 2019-02-28

## 2019-03-04 ENCOUNTER — OFFICE VISIT (OUTPATIENT)
Dept: ORTHOPEDIC SURGERY | Age: 69
End: 2019-03-04
Payer: MEDICARE

## 2019-03-04 DIAGNOSIS — G89.29 CHRONIC PAIN OF LEFT KNEE: Primary | ICD-10-CM

## 2019-03-04 DIAGNOSIS — M25.562 CHRONIC PAIN OF LEFT KNEE: Primary | ICD-10-CM

## 2019-03-04 PROCEDURE — 1123F ACP DISCUSS/DSCN MKR DOCD: CPT | Performed by: ORTHOPAEDIC SURGERY

## 2019-03-04 PROCEDURE — 1101F PT FALLS ASSESS-DOCD LE1/YR: CPT | Performed by: ORTHOPAEDIC SURGERY

## 2019-03-04 PROCEDURE — G8427 DOCREV CUR MEDS BY ELIG CLIN: HCPCS | Performed by: ORTHOPAEDIC SURGERY

## 2019-03-04 PROCEDURE — 1090F PRES/ABSN URINE INCON ASSESS: CPT | Performed by: ORTHOPAEDIC SURGERY

## 2019-03-04 PROCEDURE — 4040F PNEUMOC VAC/ADMIN/RCVD: CPT | Performed by: ORTHOPAEDIC SURGERY

## 2019-03-04 PROCEDURE — 1036F TOBACCO NON-USER: CPT | Performed by: ORTHOPAEDIC SURGERY

## 2019-03-04 PROCEDURE — G8417 CALC BMI ABV UP PARAM F/U: HCPCS | Performed by: ORTHOPAEDIC SURGERY

## 2019-03-04 PROCEDURE — G8400 PT W/DXA NO RESULTS DOC: HCPCS | Performed by: ORTHOPAEDIC SURGERY

## 2019-03-04 PROCEDURE — 3017F COLORECTAL CA SCREEN DOC REV: CPT | Performed by: ORTHOPAEDIC SURGERY

## 2019-03-04 PROCEDURE — 20610 DRAIN/INJ JOINT/BURSA W/O US: CPT | Performed by: ORTHOPAEDIC SURGERY

## 2019-03-04 PROCEDURE — G8482 FLU IMMUNIZE ORDER/ADMIN: HCPCS | Performed by: ORTHOPAEDIC SURGERY

## 2019-03-04 RX ORDER — MELOXICAM 15 MG/1
15 TABLET ORAL DAILY
Qty: 30 TABLET | Refills: 3 | Status: ON HOLD | OUTPATIENT
Start: 2019-03-04 | End: 2019-05-22 | Stop reason: HOSPADM

## 2019-03-04 RX ORDER — LIDOCAINE HYDROCHLORIDE 10 MG/ML
5 INJECTION, SOLUTION INFILTRATION; PERINEURAL ONCE
Status: COMPLETED | OUTPATIENT
Start: 2019-03-04 | End: 2019-03-04

## 2019-03-04 RX ORDER — TRIAMCINOLONE ACETONIDE 40 MG/ML
40 INJECTION, SUSPENSION INTRA-ARTICULAR; INTRAMUSCULAR ONCE
Status: COMPLETED | OUTPATIENT
Start: 2019-03-04 | End: 2019-03-04

## 2019-03-04 RX ADMIN — LIDOCAINE HYDROCHLORIDE 5 ML: 10 INJECTION, SOLUTION INFILTRATION; PERINEURAL at 14:56

## 2019-03-04 RX ADMIN — TRIAMCINOLONE ACETONIDE 40 MG: 40 INJECTION, SUSPENSION INTRA-ARTICULAR; INTRAMUSCULAR at 14:57

## 2019-03-11 DIAGNOSIS — G89.29 CHRONIC PAIN OF LEFT KNEE: ICD-10-CM

## 2019-03-11 DIAGNOSIS — M25.562 CHRONIC PAIN OF LEFT KNEE: ICD-10-CM

## 2019-03-11 DIAGNOSIS — S83.241A ACUTE MEDIAL MENISCUS TEAR OF RIGHT KNEE, INITIAL ENCOUNTER: Primary | ICD-10-CM

## 2019-03-12 RX ORDER — HYDROCODONE BITARTRATE AND ACETAMINOPHEN 5; 325 MG/1; MG/1
1 TABLET ORAL NIGHTLY
Qty: 7 TABLET | Refills: 0 | Status: SHIPPED | OUTPATIENT
Start: 2019-03-12 | End: 2019-03-20 | Stop reason: SDUPTHER

## 2019-03-20 DIAGNOSIS — G89.29 CHRONIC PAIN OF LEFT KNEE: ICD-10-CM

## 2019-03-20 DIAGNOSIS — M25.562 CHRONIC PAIN OF LEFT KNEE: ICD-10-CM

## 2019-03-21 RX ORDER — HYDROCODONE BITARTRATE AND ACETAMINOPHEN 5; 325 MG/1; MG/1
1 TABLET ORAL NIGHTLY
Qty: 28 TABLET | Refills: 0 | Status: SHIPPED | OUTPATIENT
Start: 2019-03-21 | End: 2019-03-28

## 2019-04-30 ASSESSMENT — PROMIS GLOBAL HEALTH SCALE
IN GENERAL, HOW WOULD YOU RATE YOUR MENTAL HEALTH, INCLUDING YOUR MOOD AND YOUR ABILITY TO THINK [ON A SCALE OF 1 (POOR) TO 5 (EXCELLENT)]?: 3
IN GENERAL, HOW WOULD YOU RATE YOUR PHYSICAL HEALTH [ON A SCALE OF 1 (POOR) TO 5 (EXCELLENT)]?: 4
IN THE PAST 7 DAYS, HOW WOULD YOU RATE YOUR FATIGUE ON AVERAGE [ON A SCALE FROM 1 (NONE) TO 5 (VERY SEVERE)]?: 4
IN THE PAST 7 DAYS, HOW OFTEN HAVE YOU BEEN BOTHERED BY EMOTIONAL PROBLEMS, SUCH AS FEELING ANXIOUS, DEPRESSED, OR IRRITABLE [ON A SCALE FROM 1 (NEVER) TO 5 (ALWAYS)]?: 3
TO WHAT EXTENT ARE YOU ABLE TO CARRY OUT YOUR EVERYDAY PHYSICAL ACTIVITIES SUCH AS WALKING, CLIMBING STAIRS, CARRYING GROCERIES, OR MOVING A CHAIR [ON A SCALE OF 1 (NOT AT ALL) TO 5 (COMPLETELY)]?: 3
IN THE PAST 7 DAYS, HOW WOULD YOU RATE YOUR PAIN ON AVERAGE [ON A SCALE FROM 0 (NO PAIN) TO 10 (WORST IMAGINABLE PAIN)]?: 6
WHO IS THE PERSON COMPLETING THE PROMIS V1.1 SURVEY?: 0
SUM OF RESPONSES TO QUESTIONS 2, 4, 5, & 10: 13
IN GENERAL, HOW WOULD YOU RATE YOUR SATISFACTION WITH YOUR SOCIAL ACTIVITIES AND RELATIONSHIPS [ON A SCALE OF 1 (POOR) TO 5 (EXCELLENT)]?: 3
IN GENERAL, WOULD YOU SAY YOUR HEALTH IS...[ON A SCALE OF 1 (POOR) TO 5 (EXCELLENT)]: 4
HOW IS THE PROMIS V1.1 BEING ADMINISTERED?: 2
IN GENERAL, PLEASE RATE HOW WELL YOU CARRY OUT YOUR USUAL SOCIAL ACTIVITIES (INCLUDES ACTIVITIES AT HOME, AT WORK, AND IN YOUR COMMUNITY, AND RESPONSIBILITIES AS A PARENT, CHILD, SPOUSE, EMPLOYEE, FRIEND, ETC) [ON A SCALE OF 1 (POOR) TO 5 (EXCELLENT)]?: 3
IN GENERAL, WOULD YOU SAY YOUR QUALITY OF LIFE IS...[ON A SCALE OF 1 (POOR) TO 5 (EXCELLENT)]: 4

## 2019-04-30 ASSESSMENT — KOOS JR
RISING FROM SITTING: 2
BENDING TO THE FLOOR TO PICK UP OBJECT: 2
TWISING OR PIVOTING ON KNEE: 2
STANDING UPRIGHT: 1
HOW SEVERE IS YOUR KNEE STIFFNESS AFTER FIRST WAKING IN MORNING: 2
STRAIGHTENING KNEE FULLY: 2
GOING UP OR DOWN STAIRS: 3

## 2019-05-07 ENCOUNTER — HOSPITAL ENCOUNTER (OUTPATIENT)
Dept: PREADMISSION TESTING | Age: 69
Discharge: HOME | End: 2019-05-11
Payer: MEDICARE

## 2019-05-07 VITALS
HEIGHT: 63 IN | SYSTOLIC BLOOD PRESSURE: 145 MMHG | OXYGEN SATURATION: 98 % | WEIGHT: 147.4 LBS | DIASTOLIC BLOOD PRESSURE: 76 MMHG | TEMPERATURE: 97.3 F | RESPIRATION RATE: 12 BRPM | BODY MASS INDEX: 26.12 KG/M2 | HEART RATE: 83 BPM

## 2019-05-07 DIAGNOSIS — M25.562 CHRONIC PAIN OF LEFT KNEE: Primary | ICD-10-CM

## 2019-05-07 DIAGNOSIS — G89.29 CHRONIC PAIN OF LEFT KNEE: Primary | ICD-10-CM

## 2019-05-07 LAB
-: ABNORMAL
ABSOLUTE EOS #: 0 K/UL (ref 0–0.4)
ABSOLUTE IMMATURE GRANULOCYTE: ABNORMAL K/UL (ref 0–0.3)
ABSOLUTE LYMPH #: 2.5 K/UL (ref 1–4.8)
ABSOLUTE MONO #: 0.9 K/UL (ref 0.1–1.3)
AMORPHOUS: ABNORMAL
ANION GAP SERPL CALCULATED.3IONS-SCNC: 14 MMOL/L (ref 9–17)
BACTERIA: ABNORMAL
BASOPHILS # BLD: 0 % (ref 0–2)
BASOPHILS ABSOLUTE: 0 K/UL (ref 0–0.2)
BILIRUBIN URINE: ABNORMAL
BUN BLDV-MCNC: 18 MG/DL (ref 8–23)
BUN/CREAT BLD: ABNORMAL (ref 9–20)
CALCIUM SERPL-MCNC: 9.5 MG/DL (ref 8.6–10.4)
CASTS UA: ABNORMAL /LPF
CASTS UA: ABNORMAL /LPF
CHLORIDE BLD-SCNC: 105 MMOL/L (ref 98–107)
CO2: 20 MMOL/L (ref 20–31)
COLOR: YELLOW
COMMENT UA: ABNORMAL
CREAT SERPL-MCNC: 0.77 MG/DL (ref 0.5–0.9)
CRYSTALS, UA: ABNORMAL /HPF
DIFFERENTIAL TYPE: ABNORMAL
EOSINOPHILS RELATIVE PERCENT: 0 % (ref 0–4)
EPITHELIAL CELLS UA: ABNORMAL /HPF
GFR AFRICAN AMERICAN: >60 ML/MIN
GFR NON-AFRICAN AMERICAN: >60 ML/MIN
GFR SERPL CREATININE-BSD FRML MDRD: ABNORMAL ML/MIN/{1.73_M2}
GFR SERPL CREATININE-BSD FRML MDRD: ABNORMAL ML/MIN/{1.73_M2}
GLUCOSE BLD-MCNC: 100 MG/DL (ref 70–99)
GLUCOSE URINE: NEGATIVE
HCT VFR BLD CALC: 41.6 % (ref 36–46)
HEMOGLOBIN: 13.6 G/DL (ref 12–16)
IMMATURE GRANULOCYTES: ABNORMAL %
KETONES, URINE: NEGATIVE
LEUKOCYTE ESTERASE, URINE: NEGATIVE
LYMPHOCYTES # BLD: 36 % (ref 24–44)
MCH RBC QN AUTO: 29.4 PG (ref 26–34)
MCHC RBC AUTO-ENTMCNC: 32.8 G/DL (ref 31–37)
MCV RBC AUTO: 89.7 FL (ref 80–100)
MONOCYTES # BLD: 13 % (ref 1–7)
MUCUS: ABNORMAL
NITRITE, URINE: NEGATIVE
NRBC AUTOMATED: ABNORMAL PER 100 WBC
OTHER OBSERVATIONS UA: ABNORMAL
PDW BLD-RTO: 14.3 % (ref 11.5–14.9)
PH UA: 5 (ref 5–8)
PLATELET # BLD: 246 K/UL (ref 150–450)
PLATELET ESTIMATE: ABNORMAL
PMV BLD AUTO: 8.1 FL (ref 6–12)
POTASSIUM SERPL-SCNC: 4.8 MMOL/L (ref 3.7–5.3)
PROTEIN UA: NEGATIVE
RBC # BLD: 4.63 M/UL (ref 4–5.2)
RBC # BLD: ABNORMAL 10*6/UL
RBC UA: ABNORMAL /HPF
RENAL EPITHELIAL, UA: ABNORMAL /HPF
SEG NEUTROPHILS: 51 % (ref 36–66)
SEGMENTED NEUTROPHILS ABSOLUTE COUNT: 3.6 K/UL (ref 1.3–9.1)
SODIUM BLD-SCNC: 139 MMOL/L (ref 135–144)
SPECIFIC GRAVITY UA: 1.03 (ref 1–1.03)
TRICHOMONAS: ABNORMAL
TURBIDITY: CLEAR
URINE HGB: NEGATIVE
UROBILINOGEN, URINE: NORMAL
WBC # BLD: 7 K/UL (ref 3.5–11)
WBC # BLD: ABNORMAL 10*3/UL
WBC UA: ABNORMAL /HPF
YEAST: ABNORMAL

## 2019-05-07 PROCEDURE — 80048 BASIC METABOLIC PNL TOTAL CA: CPT

## 2019-05-07 PROCEDURE — 93005 ELECTROCARDIOGRAM TRACING: CPT

## 2019-05-07 PROCEDURE — 87641 MR-STAPH DNA AMP PROBE: CPT

## 2019-05-07 PROCEDURE — 81001 URINALYSIS AUTO W/SCOPE: CPT

## 2019-05-07 PROCEDURE — 85025 COMPLETE CBC W/AUTO DIFF WBC: CPT

## 2019-05-07 PROCEDURE — 36415 COLL VENOUS BLD VENIPUNCTURE: CPT

## 2019-05-07 RX ORDER — LANOLIN ALCOHOL/MO/W.PET/CERES
10 CREAM (GRAM) TOPICAL NIGHTLY PRN
COMMUNITY
End: 2020-12-14 | Stop reason: DRUGHIGH

## 2019-05-07 RX ORDER — MAGNESIUM GLUCONATE 27 MG(500)
500 TABLET ORAL DAILY
COMMUNITY
End: 2020-10-30

## 2019-05-07 NOTE — H&P
HISTORY and [facility] [address]       NAME:  Veronica Monet  MRN: [id number]   YOB: 1950   Date: 5/7/2019   Age: 68 y.o. Gender: female       COMPLAINT AND PRESENT HISTORY:     Veronica Monet is 68 y.o.,  female, undergoing preadmission testing for left knee total arthroplasty. Patient C/O of pain swelling, stiffness, popping and cracking in the left Knee. Pt describes the pain as \"moderate. \" Symptoms started worsening over the last 6 mos to 1 year. She states in December 2018, she went to stand up and began having sharp pain. Denies any popping sound. She reports she has constant dull aching pain that worsens with prolonged walking, standing stair climbing. She denies any falls or trauma to the knee. The knee does not buckle or give way under the patient. She states the knee feels unstable, so she has been wearing a brace. Reports Dr. Saenz Half drained her knee on the last visit and had bloody material, unsure how much was drained. No locking up, No recent falls or trauma.  No redness, swelling or rashes.    :   MRI OF THE LEFT KNEE WITHOUT CONTRAST, 2/27/2019 12:45 pm       TECHNIQUE:   Multiplanar multisequence MRI of the left knee was performed without the   administration of intravenous contrast.       COMPARISON:   None.       HISTORY:   ORDERING SYSTEM PROVIDED HISTORY: Acute medial meniscus tear of left knee,   initial encounter   TECHNOLOGIST PROVIDED HISTORY:   left knee pain   Ordering Physician Provided Reason for Exam: Acute medial meniscus tear of   left knee   Acuity: Acute   Type of Exam: Initial   Additional signs and symptoms: Pt stated left knee pain , fluid above knee   drained once , NKI , pain since dec 2018       FINDINGS:   Motion limited study.       MENISCI: The medial and lateral meniscus are normal in position, morphology   and signal.       CRUCIATE LIGAMENTS: The anterior and posterior cruciate ligaments are normal   in signal, morphology and course.       EXTENSOR MECHANISM: Quadriceps and patellar tendons are intact.       LATERAL COLLATERAL LIGAMENT COMPLEX: Mild sprain of the proximal attachment   of the fibular collateral ligament.  Iliotibial band and biceps femoris   tendon are intact.       MEDIAL COLLATERAL LIGAMENT COMPLEX: Mild edema superficial to the MCL which,   in the setting of trauma, may reflect grade 1 MCL injury.  No partial   full-thickness tear identified.       KNEE JOINT: Multifocal near full-thickness chondral fissuring within the   central weight-bearing portion of the medial femoral condyle.  There is   marrow edema and subcortical cyst formation within the periphery of the   medial tibial plateau.  Partial-thickness chondromalacia within the lateral   compartment.  There is complete chondromalacia and subcortical marrow edema   throughout the patellofemoral compartment.  Large patellofemoral joint   effusion.       BONE MARROW: No discrete marrow signal abnormality.       Popliteal cyst with multiple internal septations.           Impression   Motion limited study.       No meniscal cruciate ligament tear.  Mild edema of the fibular collateral   ligament and MCL without partial or full-thickness tear.       Severe patellofemoral chondromalacia.  Mild to moderate medial compartment   chondromalacia.  Large patellofemoral joint effusion.       Popliteal cyst with multiple internal septations and debris.             PAST MEDICAL HISTORY     Past Medical History:   Diagnosis Date    Arthritis     Bone spur of left foot     Breast cancer ([facility] [address] 1998    left with reconstruction, no chemo or radiation    Colon polyp     Depression     Diverticulosis     Elevated liver enzymes     GERD (gastroesophageal reflux disease)     Hemorrhoids     Hyperlipidemia     Liver hemangioma     Migraine headache     Osteopenia     Overweight (BMI 25.0-29.9) 5/11/2015    Restless legs syndrome (RLS)     on Mirapex    Tubular adenoma SURGICAL HISTORY       Past Surgical History:   Procedure Laterality Date    BREAST BIOPSY Left     cancer found    BREAST RECONSTRUCTION Left     using abdominal tissue    CATARACT REMOVAL WITH IMPLANT Bilateral     COLONOSCOPY      COLONOSCOPY  2 10 15    DIVERTICULOSIS AND HEMORRHOIDS , TUBULAR ADENOMA     COLONOSCOPY N/A 2018    COLONOSCOPY WITH BIOPSY performed by Betina Carranza MD at [address] [place] [address] Left     remainder of breast was removed because the margins were not clear on the partial mastectomy    MASTECTOMY, PARTIAL Left     for cancer    MD REMOVAL OF HEEL SPUR Left 2018    EXOSTECTOMY DORSAL FOOT performed by Jennifer Cruz DPM at [address], fusion    TUBAL LIGATION         FAMILY HISTORY       Family History   Problem Relation Age of Onset    Ovarian Cancer Mother     Heart Attack Father     Breast Cancer Maternal Grandmother        SOCIAL HISTORY       Social History     Socioeconomic History    Marital status:      Spouse name: None    Number of children: None    Years of education: None    Highest education level: None   Occupational History    None   Social Needs    Financial resource strain: None    Food insecurity:     Worry: None     Inability: None    Transportation needs:     Medical: None     Non-medical: None   Tobacco Use    Smoking status: Former Smoker     Packs/day: 0.50     Years: 3.00     Pack years: 1.50     Last attempt to quit:      Years since quittin.3    Smokeless tobacco: Never Used   Substance and Sexual Activity    Alcohol use: Yes     Comment: 1-2 per week    Drug use: No    Sexual activity: None   Lifestyle    Physical activity:     Days per week: None     Minutes per session: None    Stress: None   Relationships    Social connections:     Talks on phone: None     Gets together: None     Attends Restorationist service: None     Active member of club or organization: None     Attends meetings of clubs or organizations: None     Relationship status: None    Intimate partner violence:     Fear of current or ex partner: None     Emotionally abused: None     Physically abused: None     Forced sexual activity: None   Other Topics Concern    None   Social History Narrative    None           REVIEW OF SYSTEMS      Allergies   Allergen Reactions    Darvon [Propoxyphene] Nausea And Vomiting and Other (See Comments)     Severe headaches    Morphine Nausea And Vomiting and Other (See Comments)     Severe headaches       Current Outpatient Medications on File Prior to Encounter   Medication Sig Dispense Refill    NONFORMULARY       melatonin 3 MG TABS tablet Take 10 mg by mouth nightly as needed      magnesium gluconate (MAGONATE) 500 MG tablet Take 500 mg by mouth daily      HYDROcodone-acetaminophen (NORCO) 5-325 MG per tablet Take 1 tablet by mouth nightly.  meloxicam (MOBIC) 15 MG tablet Take 1 tablet by mouth daily 30 tablet 3    tiZANidine (ZANAFLEX) 4 MG tablet Take 1 tablet by mouth nightly as needed (jaw pain) 90 tablet 0    Acetaminophen (TYLENOL PO) Take by mouth as needed      CALCIUM CITRATE PO Take 1,200 mg by mouth 2 times daily       pramipexole (MIRAPEX) 0.5 MG tablet TAKE ONE TABLET BY MOUTH ONCE NIGHTLY 90 tablet 3    citalopram (CELEXA) 20 MG tablet TAKE ONE TABLET BY MOUTH ONCE DAILY 90 tablet 3    atorvastatin (LIPITOR) 20 MG tablet Take 1 tablet by mouth daily 90 tablet 3    SUMAtriptan (IMITREX) 50 MG tablet Take at onset of headache; may repeat in 2 hours; no more than 2 in 24 hours. 9 tablet 11    Multiple Vitamins-Minerals (THERAPEUTIC MULTIVITAMIN-MINERALS) tablet Take 1 tablet by mouth daily.  omeprazole (PRILOSEC) 20 MG capsule Take 20 mg by mouth daily. No current facility-administered medications on file prior to encounter. General health:  Fairly good. No fever or chills.                  Skin:  No itching, redness or rash. HEENT:  No headache, epistaxis or sore throat. Neck:  No pain, stiffness or masses. Cardiovascular/Respiratory system:  No chest pain, palpitation or shortness of breath. Gastrointestinal tract: No abdominal pain, nausea, vomiting, diarrhea or constipation. Genitourinary:  No burning on micturition. No hesitancy, urgency, frequency or discoloration of urine. Locomotor: See HPI. Neuropsychiatric:  No referable complaints. GENERAL PHYSICAL EXAM:     Vitals: BP (!) 145/76   Pulse 83   Temp 97.3 °F (36.3 °C) (Oral)   Resp 12   Ht 5' 3\" (1.6 m)   Wt 147 lb 6.4 oz (66.9 kg)   SpO2 98%   BMI 26.11 kg/m²  Body mass index is 26.11 kg/m². GENERAL APPEARANCE:   Olga Trujillo is 68 y.o.,  female, nourished, conscious, alert. Does not appear to be distress or pain at this time. SKIN:  Warm, dry, no cyanosis or jaundice. HEAD:  Normocephalic, atraumatic, no swelling or tenderness. EYES:  Pupils equal, reactive to light. EARS:  No discharge, no marked hearing loss. NOSE:  No rhinorrhea, epistaxis or septal deformity. THROAT:  Not congested. No ulceration bleeding or discharge. NECK:  No stiffness, trachea central.                  CHEST:  Symmetrical and equal on expansion. HEART:  RRR S1 > S2. No audible murmurs or gallops. LUNGS:  Equal on expansion, normal breath sounds. ABDOMEN:  Obese. Soft on palpation. No localized tenderness. LYMPHATICS:  No palpable cervical lymphadenopathy. LOCOMOTOR, BACK AND SPINE:  No tenderness or deformities. EXTREMITIES: Tenderness on palpation of the Lt Knee joint space worse on the medial aspect. Wearing brace to left knee.  Able

## 2019-05-08 LAB
MRSA, DNA, NASAL: NORMAL
SPECIMEN DESCRIPTION: NORMAL

## 2019-05-21 ENCOUNTER — ANESTHESIA EVENT (OUTPATIENT)
Dept: OPERATING ROOM | Age: 69
End: 2019-05-21
Payer: MEDICARE

## 2019-05-21 ENCOUNTER — APPOINTMENT (OUTPATIENT)
Dept: GENERAL RADIOLOGY | Age: 69
End: 2019-05-21
Attending: ORTHOPAEDIC SURGERY
Payer: MEDICARE

## 2019-05-21 ENCOUNTER — HOSPITAL ENCOUNTER (OUTPATIENT)
Age: 69
Setting detail: OBSERVATION
Discharge: HOME | End: 2019-05-22
Attending: ORTHOPAEDIC SURGERY | Admitting: ORTHOPAEDIC SURGERY
Payer: MEDICARE

## 2019-05-21 ENCOUNTER — ANESTHESIA (OUTPATIENT)
Dept: OPERATING ROOM | Age: 69
End: 2019-05-21
Payer: MEDICARE

## 2019-05-21 VITALS — TEMPERATURE: 98.2 F | SYSTOLIC BLOOD PRESSURE: 121 MMHG | OXYGEN SATURATION: 95 % | DIASTOLIC BLOOD PRESSURE: 55 MMHG

## 2019-05-21 DIAGNOSIS — M17.12 PRIMARY OSTEOARTHRITIS OF LEFT KNEE: Primary | ICD-10-CM

## 2019-05-21 PROCEDURE — 2500000003 HC RX 250 WO HCPCS: Performed by: NURSE ANESTHETIST, CERTIFIED REGISTERED

## 2019-05-21 PROCEDURE — 6360000002 HC RX W HCPCS: Performed by: ORTHOPAEDIC SURGERY

## 2019-05-21 PROCEDURE — 2720000010 HC SURG SUPPLY STERILE: Performed by: ORTHOPAEDIC SURGERY

## 2019-05-21 PROCEDURE — 7100000001 HC PACU RECOVERY - ADDTL 15 MIN: Performed by: ORTHOPAEDIC SURGERY

## 2019-05-21 PROCEDURE — 64448 NJX AA&/STRD FEM NRV NFS IMG: CPT | Performed by: ANESTHESIOLOGY

## 2019-05-21 PROCEDURE — 3600000014 HC SURGERY LEVEL 4 ADDTL 15MIN: Performed by: ORTHOPAEDIC SURGERY

## 2019-05-21 PROCEDURE — G0378 HOSPITAL OBSERVATION PER HR: HCPCS

## 2019-05-21 PROCEDURE — 2500000003 HC RX 250 WO HCPCS: Performed by: ANESTHESIOLOGY

## 2019-05-21 PROCEDURE — 3700000001 HC ADD 15 MINUTES (ANESTHESIA): Performed by: ORTHOPAEDIC SURGERY

## 2019-05-21 PROCEDURE — 6370000000 HC RX 637 (ALT 250 FOR IP): Performed by: ORTHOPAEDIC SURGERY

## 2019-05-21 PROCEDURE — 6370000000 HC RX 637 (ALT 250 FOR IP): Performed by: FAMILY MEDICINE

## 2019-05-21 PROCEDURE — 73560 X-RAY EXAM OF KNEE 1 OR 2: CPT

## 2019-05-21 PROCEDURE — 2580000003 HC RX 258: Performed by: ANESTHESIOLOGY

## 2019-05-21 PROCEDURE — 2709999900 HC NON-CHARGEABLE SUPPLY: Performed by: ORTHOPAEDIC SURGERY

## 2019-05-21 PROCEDURE — 2500000003 HC RX 250 WO HCPCS: Performed by: ORTHOPAEDIC SURGERY

## 2019-05-21 PROCEDURE — 97530 THERAPEUTIC ACTIVITIES: CPT

## 2019-05-21 PROCEDURE — 27447 TOTAL KNEE ARTHROPLASTY: CPT | Performed by: ORTHOPAEDIC SURGERY

## 2019-05-21 PROCEDURE — 3700000000 HC ANESTHESIA ATTENDED CARE: Performed by: ORTHOPAEDIC SURGERY

## 2019-05-21 PROCEDURE — 97535 SELF CARE MNGMENT TRAINING: CPT

## 2019-05-21 PROCEDURE — C1776 JOINT DEVICE (IMPLANTABLE): HCPCS | Performed by: ORTHOPAEDIC SURGERY

## 2019-05-21 PROCEDURE — 7100000000 HC PACU RECOVERY - FIRST 15 MIN: Performed by: ORTHOPAEDIC SURGERY

## 2019-05-21 PROCEDURE — 6360000002 HC RX W HCPCS: Performed by: NURSE ANESTHETIST, CERTIFIED REGISTERED

## 2019-05-21 PROCEDURE — 3600000004 HC SURGERY LEVEL 4 BASE: Performed by: ORTHOPAEDIC SURGERY

## 2019-05-21 PROCEDURE — C1713 ANCHOR/SCREW BN/BN,TIS/BN: HCPCS | Performed by: ORTHOPAEDIC SURGERY

## 2019-05-21 PROCEDURE — 97166 OT EVAL MOD COMPLEX 45 MIN: CPT

## 2019-05-21 PROCEDURE — 2580000003 HC RX 258: Performed by: ORTHOPAEDIC SURGERY

## 2019-05-21 PROCEDURE — 97161 PT EVAL LOW COMPLEX 20 MIN: CPT

## 2019-05-21 DEVICE — VNGD ANT STAB BRG 13X67: Type: IMPLANTABLE DEVICE | Site: KNEE | Status: FUNCTIONAL

## 2019-05-21 DEVICE — COMPONENT PAT DIA31MM THK6.2MM THN KNEE TI ALLY S STL: Type: IMPLANTABLE DEVICE | Site: KNEE | Status: FUNCTIONAL

## 2019-05-21 DEVICE — TRAY TIB L67MM KNEE CO CHROM FIN MOD INTLOK VANGUARD: Type: IMPLANTABLE DEVICE | Site: KNEE | Status: FUNCTIONAL

## 2019-05-21 DEVICE — IMPLANTABLE DEVICE: Type: IMPLANTABLE DEVICE | Site: KNEE | Status: FUNCTIONAL

## 2019-05-21 DEVICE — CEMENT BNE 40GM HI VISC RADPQ FOR REV SURG: Type: IMPLANTABLE DEVICE | Site: KNEE | Status: FUNCTIONAL

## 2019-05-21 RX ORDER — MEPERIDINE HYDROCHLORIDE 50 MG/ML
12.5 INJECTION INTRAMUSCULAR; INTRAVENOUS; SUBCUTANEOUS EVERY 5 MIN PRN
Status: DISCONTINUED | OUTPATIENT
Start: 2019-05-21 | End: 2019-05-21 | Stop reason: HOSPADM

## 2019-05-21 RX ORDER — MIDAZOLAM HYDROCHLORIDE 1 MG/ML
INJECTION INTRAMUSCULAR; INTRAVENOUS PRN
Status: DISCONTINUED | OUTPATIENT
Start: 2019-05-21 | End: 2019-05-21 | Stop reason: SDUPTHER

## 2019-05-21 RX ORDER — DOCUSATE SODIUM 100 MG/1
100 CAPSULE, LIQUID FILLED ORAL 2 TIMES DAILY
Status: DISCONTINUED | OUTPATIENT
Start: 2019-05-21 | End: 2019-05-22 | Stop reason: HOSPADM

## 2019-05-21 RX ORDER — PROPOFOL 10 MG/ML
INJECTION, EMULSION INTRAVENOUS PRN
Status: DISCONTINUED | OUTPATIENT
Start: 2019-05-21 | End: 2019-05-21 | Stop reason: SDUPTHER

## 2019-05-21 RX ORDER — ATORVASTATIN CALCIUM 20 MG/1
20 TABLET, FILM COATED ORAL DAILY
Status: DISCONTINUED | OUTPATIENT
Start: 2019-05-21 | End: 2019-05-22 | Stop reason: HOSPADM

## 2019-05-21 RX ORDER — CALCIUM CHLORIDE 100 MG/ML
INJECTION INTRAVENOUS; INTRAVENTRICULAR PRN
Status: DISCONTINUED | OUTPATIENT
Start: 2019-05-21 | End: 2019-05-21 | Stop reason: ALTCHOICE

## 2019-05-21 RX ORDER — SCOLOPAMINE TRANSDERMAL SYSTEM 1 MG/1
1 PATCH, EXTENDED RELEASE TRANSDERMAL ONCE
Status: DISCONTINUED | OUTPATIENT
Start: 2019-05-21 | End: 2019-05-22 | Stop reason: HOSPADM

## 2019-05-21 RX ORDER — ACETAMINOPHEN 500 MG
1000 TABLET ORAL EVERY 8 HOURS SCHEDULED
Status: DISCONTINUED | OUTPATIENT
Start: 2019-05-21 | End: 2019-05-22 | Stop reason: HOSPADM

## 2019-05-21 RX ORDER — CEFAZOLIN SODIUM 1 G/50ML
INJECTION, SOLUTION INTRAVENOUS PRN
Status: DISCONTINUED | OUTPATIENT
Start: 2019-05-21 | End: 2019-05-21 | Stop reason: SDUPTHER

## 2019-05-21 RX ORDER — OXYCODONE HYDROCHLORIDE 10 MG/1
10 TABLET ORAL EVERY 4 HOURS PRN
Status: DISCONTINUED | OUTPATIENT
Start: 2019-05-21 | End: 2019-05-22 | Stop reason: HOSPADM

## 2019-05-21 RX ORDER — OXYCODONE HYDROCHLORIDE 5 MG/1
5 TABLET ORAL EVERY 4 HOURS PRN
Status: DISCONTINUED | OUTPATIENT
Start: 2019-05-21 | End: 2019-05-22 | Stop reason: HOSPADM

## 2019-05-21 RX ORDER — PRAMIPEXOLE DIHYDROCHLORIDE 0.25 MG/1
0.5 TABLET ORAL NIGHTLY
Status: DISCONTINUED | OUTPATIENT
Start: 2019-05-21 | End: 2019-05-22 | Stop reason: HOSPADM

## 2019-05-21 RX ORDER — ACETAMINOPHEN 500 MG
1000 TABLET ORAL ONCE
Status: COMPLETED | OUTPATIENT
Start: 2019-05-21 | End: 2019-05-21

## 2019-05-21 RX ORDER — CITALOPRAM 20 MG/1
20 TABLET ORAL DAILY
Status: DISCONTINUED | OUTPATIENT
Start: 2019-05-21 | End: 2019-05-22 | Stop reason: HOSPADM

## 2019-05-21 RX ORDER — GABAPENTIN 600 MG/1
600 TABLET ORAL ONCE
Status: COMPLETED | OUTPATIENT
Start: 2019-05-21 | End: 2019-05-21

## 2019-05-21 RX ORDER — PANTOPRAZOLE SODIUM 40 MG/1
40 TABLET, DELAYED RELEASE ORAL
Status: DISCONTINUED | OUTPATIENT
Start: 2019-05-22 | End: 2019-05-22 | Stop reason: HOSPADM

## 2019-05-21 RX ORDER — SODIUM CHLORIDE, SODIUM LACTATE, POTASSIUM CHLORIDE, CALCIUM CHLORIDE 600; 310; 30; 20 MG/100ML; MG/100ML; MG/100ML; MG/100ML
INJECTION, SOLUTION INTRAVENOUS CONTINUOUS
Status: DISCONTINUED | OUTPATIENT
Start: 2019-05-21 | End: 2019-05-22 | Stop reason: HOSPADM

## 2019-05-21 RX ORDER — FENTANYL CITRATE 50 UG/ML
INJECTION, SOLUTION INTRAMUSCULAR; INTRAVENOUS PRN
Status: DISCONTINUED | OUTPATIENT
Start: 2019-05-21 | End: 2019-05-21 | Stop reason: SDUPTHER

## 2019-05-21 RX ORDER — LABETALOL 20 MG/4 ML (5 MG/ML) INTRAVENOUS SYRINGE
5 EVERY 10 MIN PRN
Status: DISCONTINUED | OUTPATIENT
Start: 2019-05-21 | End: 2019-05-21 | Stop reason: HOSPADM

## 2019-05-21 RX ORDER — SODIUM CHLORIDE 0.9 % (FLUSH) 0.9 %
10 SYRINGE (ML) INJECTION PRN
Status: DISCONTINUED | OUTPATIENT
Start: 2019-05-21 | End: 2019-05-22 | Stop reason: HOSPADM

## 2019-05-21 RX ORDER — ONDANSETRON 2 MG/ML
INJECTION INTRAMUSCULAR; INTRAVENOUS PRN
Status: DISCONTINUED | OUTPATIENT
Start: 2019-05-21 | End: 2019-05-21 | Stop reason: SDUPTHER

## 2019-05-21 RX ORDER — METOPROLOL TARTRATE 5 MG/5ML
2.5 INJECTION INTRAVENOUS ONCE
Status: COMPLETED | OUTPATIENT
Start: 2019-05-21 | End: 2019-05-21

## 2019-05-21 RX ORDER — HYDROMORPHONE HCL 110MG/55ML
PATIENT CONTROLLED ANALGESIA SYRINGE INTRAVENOUS PRN
Status: DISCONTINUED | OUTPATIENT
Start: 2019-05-21 | End: 2019-05-21 | Stop reason: SDUPTHER

## 2019-05-21 RX ORDER — ONDANSETRON 2 MG/ML
4 INJECTION INTRAMUSCULAR; INTRAVENOUS EVERY 6 HOURS PRN
Status: DISCONTINUED | OUTPATIENT
Start: 2019-05-21 | End: 2019-05-22 | Stop reason: HOSPADM

## 2019-05-21 RX ORDER — DIPHENHYDRAMINE HYDROCHLORIDE 50 MG/ML
12.5 INJECTION INTRAMUSCULAR; INTRAVENOUS
Status: DISCONTINUED | OUTPATIENT
Start: 2019-05-21 | End: 2019-05-21 | Stop reason: HOSPADM

## 2019-05-21 RX ORDER — IBUPROFEN 200 MG
950 CAPSULE ORAL 2 TIMES DAILY
Status: DISCONTINUED | OUTPATIENT
Start: 2019-05-21 | End: 2019-05-22 | Stop reason: HOSPADM

## 2019-05-21 RX ORDER — SODIUM CHLORIDE 0.9 % (FLUSH) 0.9 %
10 SYRINGE (ML) INJECTION EVERY 12 HOURS SCHEDULED
Status: DISCONTINUED | OUTPATIENT
Start: 2019-05-21 | End: 2019-05-22 | Stop reason: HOSPADM

## 2019-05-21 RX ORDER — M-VIT,TX,IRON,MINS/CALC/FOLIC 27MG-0.4MG
1 TABLET ORAL DAILY
Status: DISCONTINUED | OUTPATIENT
Start: 2019-05-21 | End: 2019-05-22 | Stop reason: HOSPADM

## 2019-05-21 RX ORDER — TIZANIDINE 4 MG/1
4 TABLET ORAL NIGHTLY PRN
Status: DISCONTINUED | OUTPATIENT
Start: 2019-05-21 | End: 2019-05-22 | Stop reason: HOSPADM

## 2019-05-21 RX ORDER — CEFAZOLIN SODIUM 1 G/50ML
INJECTION, SOLUTION INTRAVENOUS PRN
Status: DISCONTINUED | OUTPATIENT
Start: 2019-05-21 | End: 2019-05-21

## 2019-05-21 RX ORDER — DEXAMETHASONE SODIUM PHOSPHATE 10 MG/ML
10 INJECTION, SOLUTION INTRAMUSCULAR; INTRAVENOUS ONCE
Status: COMPLETED | OUTPATIENT
Start: 2019-05-21 | End: 2019-05-21

## 2019-05-21 RX ORDER — KETOROLAC TROMETHAMINE 30 MG/ML
15 INJECTION, SOLUTION INTRAMUSCULAR; INTRAVENOUS EVERY 8 HOURS SCHEDULED
Status: DISCONTINUED | OUTPATIENT
Start: 2019-05-21 | End: 2019-05-22 | Stop reason: HOSPADM

## 2019-05-21 RX ORDER — LIDOCAINE HYDROCHLORIDE 10 MG/ML
INJECTION, SOLUTION EPIDURAL; INFILTRATION; INTRACAUDAL; PERINEURAL PRN
Status: DISCONTINUED | OUTPATIENT
Start: 2019-05-21 | End: 2019-05-21 | Stop reason: SDUPTHER

## 2019-05-21 RX ORDER — KETOROLAC TROMETHAMINE 30 MG/ML
INJECTION, SOLUTION INTRAMUSCULAR; INTRAVENOUS PRN
Status: DISCONTINUED | OUTPATIENT
Start: 2019-05-21 | End: 2019-05-21 | Stop reason: SDUPTHER

## 2019-05-21 RX ORDER — DEXAMETHASONE SODIUM PHOSPHATE 4 MG/ML
INJECTION, SOLUTION INTRA-ARTICULAR; INTRALESIONAL; INTRAMUSCULAR; INTRAVENOUS; SOFT TISSUE PRN
Status: DISCONTINUED | OUTPATIENT
Start: 2019-05-21 | End: 2019-05-21 | Stop reason: SDUPTHER

## 2019-05-21 RX ORDER — EPHEDRINE SULFATE/0.9% NACL/PF 50 MG/5 ML
SYRINGE (ML) INTRAVENOUS PRN
Status: DISCONTINUED | OUTPATIENT
Start: 2019-05-21 | End: 2019-05-21 | Stop reason: SDUPTHER

## 2019-05-21 RX ORDER — ONDANSETRON 2 MG/ML
4 INJECTION INTRAMUSCULAR; INTRAVENOUS
Status: DISCONTINUED | OUTPATIENT
Start: 2019-05-21 | End: 2019-05-21 | Stop reason: HOSPADM

## 2019-05-21 RX ORDER — TRANEXAMIC ACID 100 MG/ML
INJECTION, SOLUTION INTRAVENOUS PRN
Status: DISCONTINUED | OUTPATIENT
Start: 2019-05-21 | End: 2019-05-21 | Stop reason: SDUPTHER

## 2019-05-21 RX ORDER — MORPHINE SULFATE 2 MG/ML
2 INJECTION, SOLUTION INTRAMUSCULAR; INTRAVENOUS EVERY 5 MIN PRN
Status: DISCONTINUED | OUTPATIENT
Start: 2019-05-21 | End: 2019-05-21 | Stop reason: HOSPADM

## 2019-05-21 RX ADMIN — MAGNESIUM OXIDE TAB 400 MG (241.3 MG ELEMENTAL MG) 400 MG: 400 (241.3 MG) TAB at 17:56

## 2019-05-21 RX ADMIN — OXYCODONE HYDROCHLORIDE 5 MG: 5 TABLET ORAL at 13:23

## 2019-05-21 RX ADMIN — SODIUM CHLORIDE, POTASSIUM CHLORIDE, SODIUM LACTATE AND CALCIUM CHLORIDE: 600; 310; 30; 20 INJECTION, SOLUTION INTRAVENOUS at 18:25

## 2019-05-21 RX ADMIN — SODIUM CHLORIDE, POTASSIUM CHLORIDE, SODIUM LACTATE AND CALCIUM CHLORIDE: 600; 310; 30; 20 INJECTION, SOLUTION INTRAVENOUS at 06:32

## 2019-05-21 RX ADMIN — DEXAMETHASONE SODIUM PHOSPHATE 4 MG: 4 INJECTION, SOLUTION INTRA-ARTICULAR; INTRALESIONAL; INTRAMUSCULAR; INTRAVENOUS; SOFT TISSUE at 08:38

## 2019-05-21 RX ADMIN — HYDROMORPHONE HYDROCHLORIDE 1 MG: 2 INJECTION, SOLUTION INTRAMUSCULAR; INTRAVENOUS; SUBCUTANEOUS at 08:16

## 2019-05-21 RX ADMIN — Medication 1 MG: at 21:00

## 2019-05-21 RX ADMIN — KETOROLAC TROMETHAMINE 15 MG: 30 INJECTION, SOLUTION INTRAMUSCULAR; INTRAVENOUS at 21:00

## 2019-05-21 RX ADMIN — ACETAMINOPHEN 1000 MG: 500 TABLET, FILM COATED ORAL at 14:20

## 2019-05-21 RX ADMIN — MULTIPLE VITAMINS W/ MINERALS TAB 1 TABLET: TAB at 17:56

## 2019-05-21 RX ADMIN — ONDANSETRON 4 MG: 2 INJECTION INTRAMUSCULAR; INTRAVENOUS at 08:38

## 2019-05-21 RX ADMIN — OXYCODONE HYDROCHLORIDE 5 MG: 5 TABLET ORAL at 17:56

## 2019-05-21 RX ADMIN — LIDOCAINE HYDROCHLORIDE 50 MG: 10 INJECTION, SOLUTION EPIDURAL; INFILTRATION; INTRACAUDAL; PERINEURAL at 07:43

## 2019-05-21 RX ADMIN — ASPIRIN 325 MG: 325 TABLET, DELAYED RELEASE ORAL at 21:01

## 2019-05-21 RX ADMIN — KETOROLAC TROMETHAMINE 30 MG: 30 INJECTION, SOLUTION INTRAMUSCULAR at 08:44

## 2019-05-21 RX ADMIN — METOPROLOL TARTRATE 2.5 MG: 1 INJECTION, SOLUTION INTRAVENOUS at 09:58

## 2019-05-21 RX ADMIN — CITALOPRAM HYDROBROMIDE 20 MG: 20 TABLET ORAL at 17:57

## 2019-05-21 RX ADMIN — FENTANYL CITRATE 50 MCG: 50 INJECTION, SOLUTION INTRAMUSCULAR; INTRAVENOUS at 07:45

## 2019-05-21 RX ADMIN — MIDAZOLAM 1 MG: 1 INJECTION INTRAMUSCULAR; INTRAVENOUS at 07:45

## 2019-05-21 RX ADMIN — MIDAZOLAM 2 MG: 1 INJECTION INTRAMUSCULAR; INTRAVENOUS at 07:27

## 2019-05-21 RX ADMIN — CEFAZOLIN SODIUM 2 G: 1 INJECTION, SOLUTION INTRAVENOUS at 07:55

## 2019-05-21 RX ADMIN — Medication 10 MG: at 08:30

## 2019-05-21 RX ADMIN — TRANEXAMIC ACID 1000 MG: 100 INJECTION, SOLUTION INTRAVENOUS at 07:55

## 2019-05-21 RX ADMIN — MIDAZOLAM 1 MG: 1 INJECTION INTRAMUSCULAR; INTRAVENOUS at 07:42

## 2019-05-21 RX ADMIN — Medication 750 ML: at 09:18

## 2019-05-21 RX ADMIN — DOCUSATE SODIUM 100 MG: 100 CAPSULE, LIQUID FILLED ORAL at 21:00

## 2019-05-21 RX ADMIN — SODIUM CHLORIDE, POTASSIUM CHLORIDE, SODIUM LACTATE AND CALCIUM CHLORIDE: 600; 310; 30; 20 INJECTION, SOLUTION INTRAVENOUS at 08:45

## 2019-05-21 RX ADMIN — DEXAMETHASONE SODIUM PHOSPHATE 10 MG: 10 INJECTION, SOLUTION INTRAMUSCULAR; INTRAVENOUS at 06:32

## 2019-05-21 RX ADMIN — HYDROMORPHONE HYDROCHLORIDE 1 MG: 2 INJECTION, SOLUTION INTRAMUSCULAR; INTRAVENOUS; SUBCUTANEOUS at 08:33

## 2019-05-21 RX ADMIN — PROPOFOL 30 MG: 10 INJECTION, EMULSION INTRAVENOUS at 07:47

## 2019-05-21 RX ADMIN — Medication 10 MG: at 07:53

## 2019-05-21 RX ADMIN — GABAPENTIN 600 MG: 600 TABLET, FILM COATED ORAL at 06:31

## 2019-05-21 RX ADMIN — Medication 2 G: at 15:26

## 2019-05-21 RX ADMIN — PROPOFOL 170 MG: 10 INJECTION, EMULSION INTRAVENOUS at 07:43

## 2019-05-21 RX ADMIN — KETOROLAC TROMETHAMINE 15 MG: 30 INJECTION, SOLUTION INTRAMUSCULAR; INTRAVENOUS at 14:21

## 2019-05-21 RX ADMIN — ACETAMINOPHEN 1000 MG: 500 TABLET, FILM COATED ORAL at 06:31

## 2019-05-21 RX ADMIN — Medication 10 MG: at 07:56

## 2019-05-21 RX ADMIN — DOCUSATE SODIUM 100 MG: 100 CAPSULE, LIQUID FILLED ORAL at 13:26

## 2019-05-21 RX ADMIN — CALCIUM CITRATE 200 MG (950 MG) TABLET 950 MG: at 21:00

## 2019-05-21 RX ADMIN — ACETAMINOPHEN 1000 MG: 500 TABLET, FILM COATED ORAL at 21:01

## 2019-05-21 RX ADMIN — SODIUM CHLORIDE, POTASSIUM CHLORIDE, SODIUM LACTATE AND CALCIUM CHLORIDE: 600; 310; 30; 20 INJECTION, SOLUTION INTRAVENOUS at 12:35

## 2019-05-21 RX ADMIN — FENTANYL CITRATE 50 MCG: 50 INJECTION, SOLUTION INTRAMUSCULAR; INTRAVENOUS at 07:50

## 2019-05-21 ASSESSMENT — PULMONARY FUNCTION TESTS
PIF_VALUE: 10
PIF_VALUE: 17
PIF_VALUE: 21
PIF_VALUE: 28
PIF_VALUE: 19
PIF_VALUE: 12
PIF_VALUE: 1
PIF_VALUE: 13
PIF_VALUE: 12
PIF_VALUE: 18
PIF_VALUE: 12
PIF_VALUE: 16
PIF_VALUE: 19
PIF_VALUE: 21
PIF_VALUE: 18
PIF_VALUE: 19
PIF_VALUE: 18
PIF_VALUE: 21
PIF_VALUE: 12
PIF_VALUE: 8
PIF_VALUE: 17
PIF_VALUE: 23
PIF_VALUE: 18
PIF_VALUE: 22
PIF_VALUE: 12
PIF_VALUE: 12
PIF_VALUE: 10
PIF_VALUE: 18
PIF_VALUE: 11
PIF_VALUE: 1
PIF_VALUE: 7
PIF_VALUE: 10
PIF_VALUE: 4
PIF_VALUE: 18
PIF_VALUE: 8
PIF_VALUE: 2
PIF_VALUE: 4
PIF_VALUE: 11
PIF_VALUE: 21
PIF_VALUE: 1
PIF_VALUE: 26
PIF_VALUE: 1
PIF_VALUE: 13
PIF_VALUE: 21
PIF_VALUE: 1
PIF_VALUE: 1
PIF_VALUE: 17
PIF_VALUE: 26
PIF_VALUE: 1
PIF_VALUE: 26
PIF_VALUE: 1
PIF_VALUE: 3
PIF_VALUE: 25
PIF_VALUE: 10
PIF_VALUE: 16
PIF_VALUE: 12
PIF_VALUE: 16
PIF_VALUE: 25
PIF_VALUE: 4
PIF_VALUE: 3
PIF_VALUE: 1
PIF_VALUE: 25
PIF_VALUE: 11
PIF_VALUE: 16
PIF_VALUE: 22
PIF_VALUE: 17
PIF_VALUE: 22
PIF_VALUE: 1
PIF_VALUE: 4
PIF_VALUE: 4
PIF_VALUE: 5
PIF_VALUE: 14
PIF_VALUE: 3
PIF_VALUE: 23
PIF_VALUE: 2
PIF_VALUE: 24
PIF_VALUE: 6
PIF_VALUE: 13
PIF_VALUE: 3
PIF_VALUE: 11
PIF_VALUE: 26
PIF_VALUE: 18
PIF_VALUE: 1

## 2019-05-21 ASSESSMENT — PAIN SCALES - GENERAL
PAINLEVEL_OUTOF10: 0
PAINLEVEL_OUTOF10: 1
PAINLEVEL_OUTOF10: 1
PAINLEVEL_OUTOF10: 3
PAINLEVEL_OUTOF10: 0
PAINLEVEL_OUTOF10: 3
PAINLEVEL_OUTOF10: 0
PAINLEVEL_OUTOF10: 0

## 2019-05-21 ASSESSMENT — ENCOUNTER SYMPTOMS
SHORTNESS OF BREATH: 0
STRIDOR: 0

## 2019-05-21 ASSESSMENT — PAIN - FUNCTIONAL ASSESSMENT: PAIN_FUNCTIONAL_ASSESSMENT: 0-10

## 2019-05-21 ASSESSMENT — LIFESTYLE VARIABLES: SMOKING_STATUS: 0

## 2019-05-21 NOTE — ANESTHESIA PRE PROCEDURE
Department of Anesthesiology  Preprocedure Note       Name:  Mahamed Tellez   Age:  68 y.o.  :  1950                                          MRN:  [id number]         Date:  2019      Surgeon: Mack Postal):  Tremaine Lucia MD    Procedure: KNEE TOTAL ARTHROPLASTY (Left Knee)    Medications prior to admission:   Prior to Admission medications    Medication Sig Start Date End Date Taking? Authorizing Provider   melatonin 3 MG TABS tablet Take 10 mg by mouth nightly as needed   Yes Historical Provider, MD   magnesium gluconate (MAGONATE) 500 MG tablet Take 500 mg by mouth daily   Yes Historical Provider, MD   HYDROcodone-acetaminophen (NORCO) 5-325 MG per tablet Take 1 tablet by mouth nightly. Yes Historical Provider, MD   tiZANidine (ZANAFLEX) 4 MG tablet Take 1 tablet by mouth nightly as needed (jaw pain) 19  Yes Samantha Bauer MD   CALCIUM CITRATE PO Take 1,200 mg by mouth 2 times daily    Yes Historical Provider, MD   pramipexole (MIRAPEX) 0.5 MG tablet TAKE ONE TABLET BY MOUTH ONCE NIGHTLY 18  Yes Samantha Bauer MD   citalopram (CELEXA) 20 MG tablet TAKE ONE TABLET BY MOUTH ONCE DAILY 18  Yes Samantha Bauer MD   atorvastatin (LIPITOR) 20 MG tablet Take 1 tablet by mouth daily 18  Yes Samantha Bauer MD   SUMAtriptan (IMITREX) 50 MG tablet Take at onset of headache; may repeat in 2 hours; no more than 2 in 24 hours. 10/3/17  Yes Samantha Bauer MD   omeprazole (PRILOSEC) 20 MG capsule Take 20 mg by mouth daily. Yes Historical Provider, MD   NONFORMULARY     Historical Provider, MD   meloxicam (MOBIC) 15 MG tablet Take 1 tablet by mouth daily 3/4/19   Tremaine Lucia MD   Acetaminophen (TYLENOL PO) Take by mouth as needed    Historical Provider, MD   Multiple Vitamins-Minerals (THERAPEUTIC MULTIVITAMIN-MINERALS) tablet Take 1 tablet by mouth daily.     Historical Provider, MD       Current medications:    Current Facility-Administered Medications on Beta Blocker         Neuro/Psych:   (+) headaches: migraine headaches, psychiatric history: stable with treatmentdepression/anxiety    (-) seizures, neuromuscular disease, TIA and CVA           GI/Hepatic/Renal:   (+) GERD: no interval change, liver disease:,      (-) hiatal hernia, PUD, hepatitis, no renal disease, bowel prep and no morbid obesity       Endo/Other: Negative Endo/Other ROS   (+) no malignancy/cancer. (-) diabetes mellitus, hypothyroidism, hyperthyroidism, blood dyscrasia, arthritis, no electrolyte abnormalities, no malignancy/cancer               Abdominal:           Vascular: negative vascular ROS. - PVD, DVT and PE. Anesthesia Plan      spinal, regional and general     ASA 2       Induction: intravenous. MIPS: Postoperative opioids intended and Prophylactic antiemetics administered. Anesthetic plan and risks discussed with patient. Plan discussed with CRNA.                   Vanessa Puga MD   5/21/2019

## 2019-05-21 NOTE — FLOWSHEET NOTE
Patient was tearful. Nervous about procedure tomorrow. Chaplains will remain available to offer spiritual and emotional support as needed. 05/21/19 0297   Encounter Summary   Services provided to: Patient   Referral/Consult From: Rounding   Support System Spouse; Children   Continue Visiting   (5/21/19)   Complexity of Encounter Low   Length of Encounter 15 minutes   Spiritual Assessment Completed Yes   Routine   Type Initial   Assessment Approachable;Tearful   Intervention Active listening;Explored feelings, thoughts, concerns;Prayer;Sustaining presence/ Ministry of presence   Outcome Expressed gratitude

## 2019-05-21 NOTE — PROGRESS NOTES
Received in room 2042 per bed from PACU. Denies chest pain, dyspnea, nausea, or itching. Family at bedside. Oriented to call light system. Assessment completed.

## 2019-05-21 NOTE — H&P
HISTORY and [address] [address]       NAME:  Vignesh Hammonds  MRN: [id number]   YOB: 1950   Date: 5/21/2019   Age: 68 y.o. Gender: female     H&P Update Note    H&P from 05/07/2019 reviewed and updated. Patient examined in Pre Op. Feels well HT regular and has clear breath sounds. INTERVAL HISTORY:     Patient is feeling well today, denies any fever/chills, chest pain, shortness of breath. No interval changes. No interval changes to past medical history, social history, family history. Review of systems as stated above and otherwise negative. PHYSICAL EXAM:     Vitals: BP (!) 154/83   Pulse 92   Temp 97.9 °F (36.6 °C) (Oral)   Resp 16   Ht 5' 3\" (1.6 m)   Wt 147 lb 6 oz (66.8 kg)   SpO2 96%   BMI 26.11 kg/m²  Body mass index is 26.11 kg/m². Patient is alert and oriented, in no distress. Heart rate and rhythm are regular. Lungs clear to auscultation bilaterally. Abdomen is soft, non tender. No pedal edema. No interval changes. I concur with the findings. Katherin Gordillo, APRN - CNP on 5/21/2019 at 6:25 AM     HISTORY and [facility] [address]         NAME:  Vignesh Hammonds  MRN: [id number]   YOB: 1950   Date: 5/7/2019   Age: 68 y.o. Gender: female         COMPLAINT AND PRESENT HISTORY:      Vignesh Hammonds is 68 y.o.,  female, undergoing preadmission testing for left knee total arthroplasty. Patient C/O of pain swelling, stiffness, popping and cracking in the left Knee. Pt describes the pain as \"moderate. \" Symptoms started worsening over the last 6 mos to 1 year. She states in December 2018, she went to stand up and began having sharp pain. Denies any popping sound. She reports she has constant dull aching pain that worsens with prolonged walking, standing stair climbing. She denies any falls or trauma to the knee. The knee does not buckle or give way under the patient.  She states the knee feels unstable, so she has been wearing a brace. Reports Dr. Betsy Crowder drained her knee on the last visit and had bloody material, unsure how much was drained. No locking up, No recent falls or trauma.  No redness, swelling or rashes.     :   MRI OF THE LEFT KNEE WITHOUT CONTRAST, 2/27/2019 12:45 pm       TECHNIQUE:   Multiplanar multisequence MRI of the left knee was performed without the   administration of intravenous contrast.       COMPARISON:   None.       HISTORY:   ORDERING SYSTEM PROVIDED HISTORY: Acute medial meniscus tear of left knee,   initial encounter   TECHNOLOGIST PROVIDED HISTORY:   left knee pain   Ordering Physician Provided Reason for Exam: Acute medial meniscus tear of   left knee   Acuity: Acute   Type of Exam: Initial   Additional signs and symptoms: Pt stated left knee pain , fluid above knee   drained once , NKI , pain since dec 2018       FINDINGS:   Motion limited study.       MENISCI: The medial and lateral meniscus are normal in position, morphology   and signal.       CRUCIATE LIGAMENTS: The anterior and posterior cruciate ligaments are normal   in signal, morphology and course.       EXTENSOR MECHANISM: Quadriceps and patellar tendons are intact.       LATERAL COLLATERAL LIGAMENT COMPLEX: Mild sprain of the proximal attachment   of the fibular collateral ligament.  Iliotibial band and biceps femoris   tendon are intact.       MEDIAL COLLATERAL LIGAMENT COMPLEX: Mild edema superficial to the MCL which,   in the setting of trauma, may reflect grade 1 MCL injury.  No partial   full-thickness tear identified.       KNEE JOINT: Multifocal near full-thickness chondral fissuring within the   central weight-bearing portion of the medial femoral condyle.  There is   marrow edema and subcortical cyst formation within the periphery of the   medial tibial plateau.  Partial-thickness chondromalacia within the lateral   compartment.  There is complete chondromalacia and subcortical marrow edema   throughout the patellofemoral compartment.  Large patellofemoral joint   effusion.       BONE MARROW: No discrete marrow signal abnormality.       Popliteal cyst with multiple internal septations.           Impression   Motion limited study.       No meniscal cruciate ligament tear.  Mild edema of the fibular collateral   ligament and MCL without partial or full-thickness tear.       Severe patellofemoral chondromalacia.  Mild to moderate medial compartment   chondromalacia.  Large patellofemoral joint effusion.       Popliteal cyst with multiple internal septations and debris.                PAST MEDICAL HISTORY      Past Medical History        Past Medical History:   Diagnosis Date    Arthritis      Bone spur of left foot      Breast cancer ([address] 1998     left with reconstruction, no chemo or radiation    Colon polyp      Depression      Diverticulosis      Elevated liver enzymes      GERD (gastroesophageal reflux disease)      Hemorrhoids      Hyperlipidemia      Liver hemangioma      Migraine headache      Osteopenia      Overweight (BMI 25.0-29.9) 5/11/2015    Restless legs syndrome (RLS)       on Mirapex    Tubular adenoma              SURGICAL HISTORY        Past Surgical History         Past Surgical History:   Procedure Laterality Date    BREAST BIOPSY Left       cancer found    BREAST RECONSTRUCTION Left       using abdominal tissue    CATARACT REMOVAL WITH IMPLANT Bilateral      COLONOSCOPY        COLONOSCOPY   2 10 15     DIVERTICULOSIS AND HEMORRHOIDS , TUBULAR ADENOMA     COLONOSCOPY N/A 2/21/2018     COLONOSCOPY WITH BIOPSY performed by Nya Robertson MD at [address] Left       remainder of breast was removed because the margins were not clear on the partial mastectomy    MASTECTOMY, PARTIAL Left       for cancer    CA REMOVAL OF HEEL SPUR Left 11/5/2018     EXOSTECTOMY DORSAL FOOT performed by Donovan Forte DPM at [address]         L3,4,5, fusion    TUBAL LIGATION                FAMILY HISTORY        Family History   Family History   Problem Relation Age of Onset    Ovarian Cancer Mother      Heart Attack Father      Breast Cancer Maternal Grandmother              SOCIAL HISTORY        Social History   Social History            Socioeconomic History    Marital status:        Spouse name: None    Number of children: None    Years of education: None    Highest education level: None   Occupational History    None   Social Needs    Financial resource strain: None    Food insecurity:       Worry: None       Inability: None    Transportation needs:       Medical: None       Non-medical: None   Tobacco Use    Smoking status: Former Smoker       Packs/day: 0.50       Years: 3.00       Pack years: 1.50       Last attempt to quit:        Years since quittin.3    Smokeless tobacco: Never Used   Substance and Sexual Activity    Alcohol use:  Yes       Comment: 1-2 per week    Drug use: No    Sexual activity: None   Lifestyle    Physical activity:       Days per week: None       Minutes per session: None    Stress: None   Relationships    Social connections:       Talks on phone: None       Gets together: None       Attends Anabaptist service: None       Active member of club or organization: None       Attends meetings of clubs or organizations: None       Relationship status: None    Intimate partner violence:       Fear of current or ex partner: None       Emotionally abused: None       Physically abused: None       Forced sexual activity: None   Other Topics Concern    None   Social History Narrative    None               REVIEW OF SYSTEMS             Allergies   Allergen Reactions    Darvon [Propoxyphene] Nausea And Vomiting and Other (See Comments)       Severe headaches    Morphine Nausea And Vomiting and Other (See Comments)       Severe headaches                Current Outpatient Medications on File Prior to Encounter   Medication Sig Dispense Refill    NONFORMULARY          melatonin 3 MG TABS tablet Take 10 mg by mouth nightly as needed        magnesium gluconate (MAGONATE) 500 MG tablet Take 500 mg by mouth daily        HYDROcodone-acetaminophen (NORCO) 5-325 MG per tablet Take 1 tablet by mouth nightly.        meloxicam (MOBIC) 15 MG tablet Take 1 tablet by mouth daily 30 tablet 3    tiZANidine (ZANAFLEX) 4 MG tablet Take 1 tablet by mouth nightly as needed (jaw pain) 90 tablet 0    Acetaminophen (TYLENOL PO) Take by mouth as needed        CALCIUM CITRATE PO Take 1,200 mg by mouth 2 times daily         pramipexole (MIRAPEX) 0.5 MG tablet TAKE ONE TABLET BY MOUTH ONCE NIGHTLY 90 tablet 3    citalopram (CELEXA) 20 MG tablet TAKE ONE TABLET BY MOUTH ONCE DAILY 90 tablet 3    atorvastatin (LIPITOR) 20 MG tablet Take 1 tablet by mouth daily 90 tablet 3    SUMAtriptan (IMITREX) 50 MG tablet Take at onset of headache; may repeat in 2 hours; no more than 2 in 24 hours. 9 tablet 11    Multiple Vitamins-Minerals (THERAPEUTIC MULTIVITAMIN-MINERALS) tablet Take 1 tablet by mouth daily.        omeprazole (PRILOSEC) 20 MG capsule Take 20 mg by mouth daily.            No current facility-administered medications on file prior to encounter.          General health:  Fairly good. No fever or chills. Skin:  No itching, redness or rash. HEENT:  No headache, epistaxis or sore throat. Neck:  No pain, stiffness or masses. Cardiovascular/Respiratory system:  No chest pain, palpitation or shortness of breath. Gastrointestinal tract: No abdominal pain, nausea, vomiting, diarrhea or constipation. Genitourinary:  No burning on micturition. No hesitancy, urgency, frequency or discoloration of urine. Locomotor: See HPI.                  Neuropsychiatric:  No referable complaints.                  GENERAL PHYSICAL EXAM:    Vitals: BP (!) 145/76   Pulse 83   Temp 97.3 °F (36.3 °C) (Oral)   Resp 12   Ht 5' 3\" (1.6 m)   Wt 147 lb 6.4 oz (66.9 kg)   SpO2 98%   BMI 26.11 kg/m²  Body mass index is 26.11 kg/m².         GENERAL APPEARANCE:   Olga Trujillo is 68 y.o.,  female, nourished, conscious, alert. Does not appear to be distress or pain at this time. SKIN:  Warm, dry, no cyanosis or jaundice. HEAD:  Normocephalic, atraumatic, no swelling or tenderness. EYES:  Pupils equal, reactive to light. EARS:  No discharge, no marked hearing loss. NOSE:  No rhinorrhea, epistaxis or septal deformity. THROAT:  Not congested. No ulceration bleeding or discharge. NECK:  No stiffness, trachea central.                  CHEST:  Symmetrical and equal on expansion. HEART:  RRR S1 > S2. No audible murmurs or gallops. LUNGS:  Equal on expansion, normal breath sounds. ABDOMEN:  Obese. Soft on palpation. No localized tenderness. LYMPHATICS:  No palpable cervical lymphadenopathy.      LOCOMOTOR, BACK AND SPINE:  No tenderness or deformities. EXTREMITIES: Tenderness on palpation of the Lt Knee joint space worse on the medial aspect. Wearing brace to left knee. Able to fully flex, extension to 50-60 degrees with pain. +Varus/valgus stress testing. Symmetrical, no pedal edema. Navdeeps sign negative. No discoloration or ulcerations.     NEUROLOGIC:  The patient is conscious, alert, oriented, No apparent focal sensory or motor deficits.                                                                                    PROVISIONAL DIAGNOSES / SURGERY:       Left Knee DJD  Left Total Knee Arthoplasty      ANNIE Armstrong CNP on 5/7/2019 at 11:26 AM

## 2019-05-21 NOTE — CARE COORDINATION
Joint Replacement Nurse Navigator Discharge Planning Note:    Admission Date:  5/21/2019 Luis Daniel Garcia is a 68 y.o.  female    Admitted for : Primary osteoarthritis of left knee [M17.12]    Met with:  Patient and Family    PCP:  Mer Esteban MD              Insurance:  Medicare    Current Residence/ Living Arrangements:  independently at home             Current Services PTA:  No    Is patient agreeable to [address]: No    Is patient agreeable to outpatient physical therapy:  Yes    Freedom of choice provided: Yes         [address]/Outpatient Therapy chosen:  [id number] needed: No    Current home DME:  walker    Pharmacy:  [facility]    Does Patient want to use MEDS to BEDS?([facility] only) Yes    Transportation Provider:  Patient and Family                       Discharge Plan:   Patient intends to discharge to Outpatient Therapy    Patient does not need a wheeled walker.      Anticipated discharge date 5/22      Readmission Risk              Risk of Unplanned Readmission:        5           Electronically signed by: Kaushal Wall RN on 5/21/2019 at 3:05 PM

## 2019-05-21 NOTE — PROGRESS NOTES
Pain: Denies  Vital Signs  Patient Currently in Pain: Denies       Orientation  Orientation  Overall Orientation Status: Within Normal Limits  Social/Functional History  Social/Functional History  Lives With: Spouse  Type of Home: House  Home Layout: One level  Home Access: Stairs to enter with rails  Entrance Stairs - Number of Steps: 2  Entrance Stairs - Rails: Left  Bathroom Shower/Tub: Walk-in shower, Doors  Bathroom Toilet: Handicap height  Bathroom Equipment: Built-in shower seat(Spouse planning to install safety rails around toilet)  Bathroom Accessibility: Walker accessible  Home Equipment: [address] Help From: Family  ADL Assistance: [address]: Independent  Homemaking Responsibilities: Yes(Shares with spouse)  Ambulation Assistance: Independent  Transfer Assistance: Independent  Active : Yes  Mode of Transportation: Car  Occupation: Retired  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        Objective     Observation/Palpation  Observation: On-Q pain ball and ace wrap L LE    AROM RLE (degrees)  RLE AROM: WNL  AROM LLE (degrees)  LLE AROM : WFL  L Knee Flexion 0-145: 90  L Knee Extension 0: 0  Strength RLE  Strength RLE: WNL  Strength LLE  Strength LLE: WFL(hip/ankle)  L Knee Flexion: 3+/5  L Knee Extension: 3/5        Bed mobility  Supine to Sit: Stand by assistance  Scooting: Stand by assistance  Comment: pt left up in chair  Transfers  Sit to Stand: Contact guard assistance  Stand to sit: Contact guard assistance  Bed to Chair: Contact guard assistance(with RW)  Ambulation  Ambulation?: Yes  Ambulation 1  Device: Rolling Walker  Assistance: Contact guard assistance  Quality of Gait: slow, steady gait  Distance: 20ft x 2  Comments: small steps with step-to gait pattern  Stairs/Curb  Stairs?: No     Balance  Sitting - Static: Good  Sitting - Dynamic: Good  Standing - Static: Good(SBA no device)  Standing - Dynamic: Fair;+(CGA no device)        Plan   Plan  Times per week: BID  Current Treatment Recommendations: Strengthening, ROM, Functional Mobility Training, Transfer Training, Stair training, Gait Training, Safety Education & Training  Safety Devices  Type of devices: Call light within reach, Left in chair(spouse present)    Goals  Short term goals  Time Frame for Short term goals: 2-3 sessions  Short term goal 1: mod-I bed mobility  Short term goal 2: mod-I transfers  Short term goal 3: mod-I gait with RW x 150-200ft  Short term goal 4: negotiate 4-6 steps with rail/device and CGA  Patient Goals   Patient goals : normal activity without pain       Therapy Time   Individual Concurrent Group Co-treatment   Time In 1250         Time Out 1328         Minutes Kayleen 36, PT

## 2019-05-21 NOTE — OP NOTE
Operative dictation  Preoperative diagnosis: DJD left knee  Postoperative diagnosis: Same  Procedure: Left total knee arthroplasty utilizing Biomet Vanguard prosthesis size 57.5 cruciate retaining femur with a 67 tibia 13 mm anterior stabilized polyethylene a 31 thin patella  Surgeons: Marissa Howard  Assistant: Jay Dueñas and Vinay VELA Anesthesia: Attempted spinal/general with adductor canal block    Patient is a 68-year-old female whose had problems with her left knee for many years. She is noted to have progressive valgus deformity of the knee. She has failed conservative treatment including physical therapy and nonsteroidals and injections. With her activities become restricted is felt the patient benefit from total knee arthro-plasty and she consented with good comprehension of all risks and benefits    Patient was given 2 g Ancef pulmonary as well as adductor canal block. She is then taken obviously work attempted spinal was performed and she had upgoing treatment under general anesthesia. A tourniquet was applied left thigh left lower extremity prepped and draped in usual fashion. The leg was exsanguinated and the tourniquet inflated at [address] Timeout was called to verify laterality. An midline incision centered patella taken down the subcu right mid vastus splitting approach was performed. After synovectomy the patella was placed in a side note to be severely eroded as was the underlying trochlear groove with actual grooves into the trochlea. The patella was calibrated in a freehand resection was made with an oscillating saw solution remaining bone. Size 31 patellar drug I give the best overall fit and allow for slight medialization. The triple pegs were drilled to 31 thin patella was selected. This was then replaced with a protective patellar plate    Knee was then flexed reveal significant arthrosis.   She was also noted to have a hypoplastic lateral femoral condyle consistent with a valgus knee.  A drill hole was made in the distal femur and femoral canal was accessed with a drill bit was then irrigated and suctioned of fluid I Reich position and distal femoral cut was made in 4° of valgus and the +2 position. The extramedullary tibial cutting guide was then positioned and represents the tubercle and ankle joint in May for cut at 90° the long axis with a slight posterior slope. The sizing guide in 3° X rotation was to place the cut femoral surface. We had artificially actually rotate a little bit more to parallel the trans-epicondylar axis due to her hypoplastic lateral femoral condyle. This was sized for a 57.5. The drill holes are made. The 57.5 cutting block was positioned in proper external rotation was verified with the trans-epicondylar axis. Satisfied with this anterior posterior chamfer cuts are made and the bone fragments removed. Utilizing laminar spreaders and osteotomes of the remainder the meniscal elements removed and any posterior osteophytes were removed as well as posterior capsule stripping from the posterior condyles. The capsule was also injected at this point with the orthopedic cocktail. The tibial cut was then assessed with the baseplate I found to be good. Block was inserted and is noted that just a very minimal release medially was done as we had done very minimal release prior to procedure incision due to her valgus knee. A 67 baseplate gave the best overall fit allow for alignment jorge luis on the middle third of the tubercle. This was pinned in placed target as applied proximal tibias reaming and impacted. This was then trialed with Asst. 57.5 femur and a 12 mm and stabilized polyethylene is found to give good range of motion stability as well as patellar tracking patellar height. The drill holes were made in the distal femur. All trial components removed. Knee was then irrigated and dried while cement was prepared.   Cement was in place on both cut surfaces of both implants and these were impacted and an excess cement removed and compressed with a 14 polyethylene well patella was also applied and clamped. The knee was injected with remainder 100 mL a total joint solution. Betadine lavage was then carried out with 3 minutes this was then irrigated the knee inspected thoroughly for any further soft tissue or cement debris. Upon retrial he elected to go with a permanent 13 mm anterior stabilized polyethylene which was placed and locked. Knee was sprayed with platelet rich platelet poor plasma spray. The tourniquet was deflated at 40 minutes. Hemostasis was excellent. The arthrotomy was closed with #1 strep infection of the vastus closed with a double layer of 0 Vicryl suture. Subcu was closed with a 2-0 Monocryl strata fixed and supply for the skin covered with Aquasol dressing.   The patient was awakened taken postanesthesia care unit in good condition

## 2019-05-21 NOTE — PROGRESS NOTES
PROGRAM - L knee. Short term goal 2: Pt will V/D good understanding of TOTAL JOINT PROGRAM - L knee. Short term goal 3: Pt will V/D good understanding of AE/DME/modified techniques for functional ADL's. Short term goal 4: Pt will actively participate in 30 minutes of therapeutic exercise/activity to promote increased independence and safety with self-care and mobility.     Plan  Safety Devices  Safety Devices in place: Yes  Type of devices: Patient at risk for falls, Gait belt, Left in chair, Call light within reach     Plan  Times per week: 5-7  Times per day: Twice a day  Current Treatment Recommendations: Balance Training, Functional Mobility Training, Endurance Training, Pain Management, Safety Education & Training, Patient/Caregiver Education & Training, Equipment Evaluation, Education, & procurement, Self-Care / ADL, Home Management Training    Equipment Recommendations  Equipment Needed: (TBD)  OT Individual Minutes  Time In: 1250  Time Out: 1434  Minutes: 36    Electronically signed by Zina Ponce on 5/21/19 at 2:23 PM

## 2019-05-22 VITALS
WEIGHT: 147.38 LBS | HEART RATE: 86 BPM | BODY MASS INDEX: 26.11 KG/M2 | OXYGEN SATURATION: 95 % | SYSTOLIC BLOOD PRESSURE: 110 MMHG | RESPIRATION RATE: 20 BRPM | DIASTOLIC BLOOD PRESSURE: 53 MMHG | HEIGHT: 63 IN | TEMPERATURE: 98.2 F

## 2019-05-22 LAB
HCT VFR BLD CALC: 32.1 % (ref 36–46)
HEMOGLOBIN: 10.6 G/DL (ref 12–16)

## 2019-05-22 PROCEDURE — 6370000000 HC RX 637 (ALT 250 FOR IP): Performed by: FAMILY MEDICINE

## 2019-05-22 PROCEDURE — 97116 GAIT TRAINING THERAPY: CPT

## 2019-05-22 PROCEDURE — 6370000000 HC RX 637 (ALT 250 FOR IP): Performed by: ORTHOPAEDIC SURGERY

## 2019-05-22 PROCEDURE — 97110 THERAPEUTIC EXERCISES: CPT

## 2019-05-22 PROCEDURE — G0378 HOSPITAL OBSERVATION PER HR: HCPCS

## 2019-05-22 PROCEDURE — 97530 THERAPEUTIC ACTIVITIES: CPT

## 2019-05-22 PROCEDURE — 85018 HEMOGLOBIN: CPT

## 2019-05-22 PROCEDURE — 96376 TX/PRO/DX INJ SAME DRUG ADON: CPT

## 2019-05-22 PROCEDURE — 99024 POSTOP FOLLOW-UP VISIT: CPT | Performed by: ORTHOPAEDIC SURGERY

## 2019-05-22 PROCEDURE — 6360000002 HC RX W HCPCS: Performed by: ORTHOPAEDIC SURGERY

## 2019-05-22 PROCEDURE — 85014 HEMATOCRIT: CPT

## 2019-05-22 PROCEDURE — 36415 COLL VENOUS BLD VENIPUNCTURE: CPT

## 2019-05-22 PROCEDURE — 96374 THER/PROPH/DIAG INJ IV PUSH: CPT

## 2019-05-22 PROCEDURE — 2580000003 HC RX 258: Performed by: ORTHOPAEDIC SURGERY

## 2019-05-22 PROCEDURE — 97535 SELF CARE MNGMENT TRAINING: CPT

## 2019-05-22 RX ORDER — OXYCODONE HYDROCHLORIDE 5 MG/1
5 TABLET ORAL EVERY 4 HOURS PRN
Qty: 40 TABLET | Refills: 0 | Status: SHIPPED | OUTPATIENT
Start: 2019-05-22 | End: 2019-06-05 | Stop reason: SDUPTHER

## 2019-05-22 RX ADMIN — KETOROLAC TROMETHAMINE 15 MG: 30 INJECTION, SOLUTION INTRAMUSCULAR; INTRAVENOUS at 05:50

## 2019-05-22 RX ADMIN — DOCUSATE SODIUM 100 MG: 100 CAPSULE, LIQUID FILLED ORAL at 07:50

## 2019-05-22 RX ADMIN — ACETAMINOPHEN 1000 MG: 500 TABLET, FILM COATED ORAL at 13:39

## 2019-05-22 RX ADMIN — OXYCODONE HYDROCHLORIDE 5 MG: 5 TABLET ORAL at 12:42

## 2019-05-22 RX ADMIN — CITALOPRAM HYDROBROMIDE 20 MG: 20 TABLET ORAL at 07:50

## 2019-05-22 RX ADMIN — Medication 2 G: at 00:31

## 2019-05-22 RX ADMIN — ASPIRIN 325 MG: 325 TABLET, DELAYED RELEASE ORAL at 07:51

## 2019-05-22 RX ADMIN — OXYCODONE HYDROCHLORIDE 5 MG: 5 TABLET ORAL at 00:40

## 2019-05-22 RX ADMIN — KETOROLAC TROMETHAMINE 15 MG: 30 INJECTION, SOLUTION INTRAMUSCULAR; INTRAVENOUS at 13:39

## 2019-05-22 RX ADMIN — ATORVASTATIN CALCIUM 20 MG: 20 TABLET, FILM COATED ORAL at 07:50

## 2019-05-22 RX ADMIN — Medication 10 ML: at 13:42

## 2019-05-22 RX ADMIN — PANTOPRAZOLE SODIUM 40 MG: 40 TABLET, DELAYED RELEASE ORAL at 05:50

## 2019-05-22 RX ADMIN — MAGNESIUM OXIDE TAB 400 MG (241.3 MG ELEMENTAL MG) 400 MG: 400 (241.3 MG) TAB at 07:50

## 2019-05-22 RX ADMIN — CALCIUM CITRATE 200 MG (950 MG) TABLET 950 MG: at 07:49

## 2019-05-22 RX ADMIN — MULTIPLE VITAMINS W/ MINERALS TAB 1 TABLET: TAB at 07:50

## 2019-05-22 RX ADMIN — OXYCODONE HYDROCHLORIDE 5 MG: 5 TABLET ORAL at 08:00

## 2019-05-22 RX ADMIN — ACETAMINOPHEN 1000 MG: 500 TABLET, FILM COATED ORAL at 05:50

## 2019-05-22 RX ADMIN — Medication 10 ML: at 07:51

## 2019-05-22 ASSESSMENT — PAIN SCALES - GENERAL
PAINLEVEL_OUTOF10: 0
PAINLEVEL_OUTOF10: 3
PAINLEVEL_OUTOF10: 0
PAINLEVEL_OUTOF10: 4
PAINLEVEL_OUTOF10: 3
PAINLEVEL_OUTOF10: 4
PAINLEVEL_OUTOF10: 4
PAINLEVEL_OUTOF10: 1
PAINLEVEL_OUTOF10: 3

## 2019-05-22 ASSESSMENT — PAIN DESCRIPTION - LOCATION
LOCATION: KNEE

## 2019-05-22 ASSESSMENT — PAIN DESCRIPTION - ORIENTATION
ORIENTATION: LEFT

## 2019-05-22 ASSESSMENT — PAIN DESCRIPTION - DESCRIPTORS
DESCRIPTORS: ACHING
DESCRIPTORS: ACHING

## 2019-05-22 ASSESSMENT — PAIN DESCRIPTION - PAIN TYPE
TYPE: ACUTE PAIN;SURGICAL PAIN
TYPE: SURGICAL PAIN
TYPE: SURGICAL PAIN

## 2019-05-22 ASSESSMENT — PAIN DESCRIPTION - FREQUENCY: FREQUENCY: INTERMITTENT

## 2019-05-22 NOTE — ANESTHESIA POST-OP
POD #1 s/p Lt TKA with Adductor Canal Catheter    Patient doing well, sitting up in bed. AC catheter infusing without difficulty. Catheter site dressing clean, dry and intact . No tenderness or induration on palpation . VAS 3-4.     A/P - Adequate post anesthesia period with acceptable pain control

## 2019-05-22 NOTE — PLAN OF CARE
Problem: SAFETY  Goal: Free from accidental physical injury  5/22/2019 0428 by Christine Michaels RN  Outcome: Ongoing     Problem: PAIN  Goal: Patient's pain/discomfort is manageable  5/22/2019 0428 by Christine Michaels RN  Outcome: Ongoing     Problem: KNOWLEDGE DEFICIT  Goal: Patient/S.O. demonstrates understanding of disease process, treatment plan, medications, and discharge instructions.   5/22/2019 0428 by Christine Michaels RN  Outcome: Ongoing     Problem: Musculor/Skeletal Functional Status  Goal: Highest potential functional level  5/22/2019 0428 by Christine Michaels RN  Outcome: Ongoing     Problem: Falls - Risk of:  Goal: Will remain free from falls  Description  Will remain free from falls  5/22/2019 0428 by Christine Michaels RN  Outcome: Ongoing

## 2019-05-22 NOTE — PROGRESS NOTES
Physical Therapy  Facility/Department: [facility] MED SURG  Daily Treatment Note  NAME: Hugo Drumomnd  : 1950  MRN: [id number]    Date of Service: 2019    Discharge Recommendations:  (P) Home with assist PRN, Outpatient PT   PT Equipment Recommendations  Equipment Needed: (P) No    Patient Diagnosis(es): The encounter diagnosis was Primary osteoarthritis of left knee. has a past medical history of Arthritis, Bone spur of left foot, Breast cancer ([address] Colon polyp, Depression, Diverticulosis, Elevated liver enzymes, GERD (gastroesophageal reflux disease), Hemorrhoids, Hyperlipidemia, Liver hemangioma, Migraine headache, Osteopenia, Overweight (BMI 25.0-29.9), Restless legs syndrome (RLS), and Tubular adenoma. has a past surgical history that includes Mastectomy; Colonoscopy; Tubal ligation; Colonoscopy (2 10 15); Spine surgery; Colonoscopy (N/A, 2018); Mastectomy, partial (Left); Mastectomy (Left); Cataract removal with implant (Bilateral); Breast biopsy (Left); Breast reconstruction (Left); pr removal of heel spur (Left, 2018); eye surgery; and Total knee arthroplasty (Left, 2019). Restrictions  Restrictions/Precautions  Restrictions/Precautions: (P) Fall Risk, Surgical Protocols  Required Braces or Orthoses?: (P) Yes(Wears a lift in her L shoe)  Implants present? : (P) Metal implants(L TKA 19; jorge luis at L3-5)  Subjective   General  Family / Caregiver Present: (P) Yes  Subjective  Subjective: (P) Pt in bed upon entry. Pt anxious to get started so she could go home. General Comment  Comments: (P) Pt very pleasant and cooperative.   Pain Assessment  Pain Assessment: (P) 0-10  Pain Level: (P) 3  Pain Type: (P) Surgical pain  Pain Location: (P) Knee  Pain Orientation: (P) Left  Non-Pharmaceutical Pain Intervention(s): (P) Ambulation/Increased Activity;Cold applied       Orientation     Cognition      Objective   Bed mobility  Scooting: (P) Stand by assistance  Transfers  Sit to Stand: (P) 150-200ft  Short term goal 4: (P) negotiate 4-6 steps with rail/device and CGA  Patient Goals   Patient goals : (P) normal activity without pain    Plan    Plan  Times per week: (P) BID  Current Treatment Recommendations: (P) Strengthening, ROM, Functional Mobility Training, Transfer Training, Stair training, Gait Training, Safety Education & Training  Safety Devices  Type of devices: (P) Call light within reach, Left in chair(spouse present)     Therapy Time   Individual Concurrent Group Co-treatment   Time In 1100         Time Out 1145         Minutes [address] [place] [address], [facility]   Electronically signed by Sharla Lee PTA on 5/22/2019 at 12:31 PM

## 2019-05-22 NOTE — PROGRESS NOTES
[address]   INPATIENT OCCUPATIONAL THERAPY  PROGRESS NOTE  Date: 2019  Patient Name: Libertad Cunningham      Room: 5463/3568-20  MRN: [id number]    : 1950  (68 y.o.) Gender: female     Discharge Recommendations:  Home with assist PRN       Referring Practitioner: Dr. Kathie Gaspar  Diagnosis: L TKA 19  General  Chart Reviewed: Yes  Patient assessed for rehabilitation services?: Yes  Family / Caregiver Present: Yes  Referring Practitioner: Dr. Kathie Gaspar  Diagnosis: L TKA 19    Restrictions  Restrictions/Precautions: Fall Risk, Surgical Protocols  Implants present? : Metal implants(L TKA 19; jorge luis at L3-5)  Required Braces or Orthoses?: Yes(Wears a lift in her L shoe)      Subjective  Subjective: pt states that her son-in-law works in PT  Comments: pt alert and impulsive at times  Patient Currently in Pain: Denies(at rest or after activity)  Pain Level: 4  Pain Location: Knee  Pain Orientation: Left  Overall Orientation Status: Within Functional Limits     Pain Assessment  Pain Assessment: 0-10  Pain Level: 4  Pain Type: Acute pain, Surgical pain  Pain Location: Knee  Pain Orientation: Left  Pain Descriptors: Aching    Objective  Cognition  Overall Cognitive Status: WFL  Bed mobility  Rolling to Right: Stand by assistance  Supine to Sit: Stand by assistance  Scooting: Stand by assistance  Balance  Sitting Balance: Supervision  Standing Balance: Stand by assistance  Standing Balance  Time: 2-3 minutes x 3 c RW  Activity: ADLs, transfers  Comment: pt demo impulsiveness at times, no LOB noted  Functional Mobility  Functional - Mobility Device: Rolling Walker  Activity: Other(room mobility)  Assist Level: Stand by assistance  Functional Mobility Comments: pt demo impulsiveness at times, no LOB noted   ADL  Feeding: Independent  Grooming: Setup  UE Bathing: Setup  LE Bathing: Setup; Increased time to complete  UE Dressing: Setup  LE Dressing: Setup;Stand by assistance;Verbal cueing; Increased time to complete(shoes, socks, SBA in stand to kevyn underwear/pants over hips)  Toileting: Stand by assistance(per pt report)  Additional Comments: pt declines shower this date, pt dressed while sitting EOB, pt demo ability to complete LB without utilizing AE, pt edu on AE/DME for LB bathing/dressing. mod VCs for safety 2* impulsiveness ie attempting to stand without RW, attempt to stand only wearing TEDs     Transfers  Sit to stand: Stand by assistance  Stand to sit: Stand by assistance  Transfer Comments: w/RW, VCs for hand placement c F return  Toilet Transfers  Toilet - Technique: Ambulating  Equipment Used: Raised toilet seat with rails  Toilet Transfer: Stand by assistance  Toilet Transfers Comments: per pt report       Assessment  Performance deficits / Impairments: Decreased functional mobility ; Decreased ADL status; Decreased endurance;Decreased balance;Decreased high-level IADLs  Prognosis: Good  Discharge Recommendations: Home with assist PRN  Activity Tolerance: Patient Tolerated treatment well  Safety Devices in place: Yes  Type of devices: Gait belt;Left in chair;Call light within reach(daughter in room as writer departed)  Equipment Recommendations  Equipment Needed: No          Patient Education:  Patient Education: OT POC, On-Q mgt during shower, cryo-cuff mgt and application to manage pain/swelling, AE/DME, home safety/fall prevention, RW safety during transfers and func mobility, car transfer  [address] and daughter  Method: demonstration, explanation and handout       Outcome: acknowledged understanding, demonstrated understanding and needs reinforcement c safety during transfers    Plan  Safety Devices  Safety Devices in place: Yes  Type of devices: Gait belt, Left in chair, Call light within reach(daughter in room as writer departed)  Plan  Times per week: 5-7  Times per day: Twice a day  Current Treatment Recommendations: Balance Training, Functional Mobility Training, Endurance Training, Pain Management, Safety Education & Training, Patient/Caregiver Education & Training, Equipment Evaluation, Education, & procurement, Self-Care / ADL, Home Management Training      Goals  Short term goals  Time Frame for Short term goals: By Discharge  Short term goal 1: Pt will actively participate in [address] - L knee. Short term goal 2: Pt will V/D good understanding of TOTAL JOINT PROGRAM - L knee. Short term goal 3: Pt will V/D good understanding of AE/DME/modified techniques for functional ADL's. Short term goal 4: Pt will actively participate in 30 minutes of therapeutic exercise/activity to promote increased independence and safety with self-care and mobility.     OT Individual Minutes  Time In: 7733  Time Out: 75 [employer]  Minutes: 41      Electronically signed by HEMANT Martin on 5/22/19 at 3:24 PM

## 2019-05-22 NOTE — PROGRESS NOTES
Family Medicine Consult Note    PCP: Sharon Leach MD    Date of Admission: 5/21/2019    Date of Service: Pt seen/examined on 5/22/2019     Chief Complaint:  OA left knee      History Of Present Illness: The patient is a 68 y.o. female who presents to [facility] with OA left knee for left knee replacement. She has no complaints. Past Medical History:        Diagnosis Date    Arthritis     Bone spur of left foot     Breast cancer ([address] 1998    left with reconstruction, no chemo or radiation    Colon polyp     Depression     Diverticulosis     Elevated liver enzymes     GERD (gastroesophageal reflux disease)     Hemorrhoids     Hyperlipidemia     Liver hemangioma     Migraine headache     Osteopenia     Overweight (BMI 25.0-29.9) 5/11/2015    Restless legs syndrome (RLS)     on Mirapex    Tubular adenoma        Past Surgical History:        Procedure Laterality Date    BREAST BIOPSY Left     cancer found    BREAST RECONSTRUCTION Left     using abdominal tissue    CATARACT REMOVAL WITH IMPLANT Bilateral     COLONOSCOPY      COLONOSCOPY  2 10 15    DIVERTICULOSIS AND HEMORRHOIDS , TUBULAR ADENOMA     COLONOSCOPY N/A 2/21/2018    COLONOSCOPY WITH BIOPSY performed by Sumanth Clark MD at [facility] Left     remainder of breast was removed because the margins were not clear on the partial mastectomy    MASTECTOMY, PARTIAL Left     for cancer    FL REMOVAL OF HEEL SPUR Left 11/5/2018    EXOSTECTOMY DORSAL FOOT performed by Bert Ruvalcaba DPM at [address]      L3,4,5, fusion    TOTAL KNEE ARTHROPLASTY Left 5/21/2019    KNEE TOTAL ARTHROPLASTY performed by George Mitchell MD at [address] [facility]         Medications Prior to Admission:    Prior to Admission medications    Medication Sig Start Date End Date Taking?  Authorizing Provider   melatonin 3 MG TABS tablet Take 10 mg by mouth nightly as needed Yes Historical Provider, MD   magnesium gluconate (MAGONATE) 500 MG tablet Take 500 mg by mouth daily   Yes Historical Provider, MD   HYDROcodone-acetaminophen (NORCO) 5-325 MG per tablet Take 1 tablet by mouth nightly. Yes Historical Provider, MD   tiZANidine (ZANAFLEX) 4 MG tablet Take 1 tablet by mouth nightly as needed (jaw pain) 1/4/19  Yes Pinky Hogan MD   CALCIUM CITRATE PO Take 1,200 mg by mouth 2 times daily    Yes Historical Provider, MD   pramipexole (MIRAPEX) 0.5 MG tablet TAKE ONE TABLET BY MOUTH ONCE NIGHTLY 9/19/18  Yes Pinky Hogan MD   citalopram (CELEXA) 20 MG tablet TAKE ONE TABLET BY MOUTH ONCE DAILY 9/19/18  Yes Pinky Hgoan MD   atorvastatin (LIPITOR) 20 MG tablet Take 1 tablet by mouth daily 9/19/18  Yes Pinky Hogan MD   SUMAtriptan (IMITREX) 50 MG tablet Take at onset of headache; may repeat in 2 hours; no more than 2 in 24 hours. 10/3/17  Yes Pinky Hogan MD   omeprazole (PRILOSEC) 20 MG capsule Take 20 mg by mouth daily. Yes Historical Provider, MD   NONFORMULARY     Historical Provider, MD   meloxicam (MOBIC) 15 MG tablet Take 1 tablet by mouth daily 3/4/19   Lydia Zuniga MD   Acetaminophen (TYLENOL PO) Take by mouth as needed    Historical Provider, MD   Multiple Vitamins-Minerals (THERAPEUTIC MULTIVITAMIN-MINERALS) tablet Take 1 tablet by mouth daily. Historical Provider, MD       Allergies:  Darvon [propoxyphene] and Morphine    Social History:  The patient currently lives at home    TOBACCO:   reports that she quit smoking about 11 years ago. She has a 1.50 pack-year smoking history. She has never used smokeless tobacco.  ETOH:   reports that she drinks alcohol.       Family History:          Problem Relation Age of Onset    Ovarian Cancer Mother     Heart Attack Father     Breast Cancer Maternal Grandmother        PHYSICAL EXAM:    /62   Pulse 86   Temp 98.1 °F (36.7 °C) (Oral)   Resp 20   Ht 5' 3\" (1.6 m)   Wt 147 lb 6 oz (66.8 kg)   SpO2 93%   BMI 26.11 kg/m²     General appearance: No apparent distress appears stated age and cooperative. HEENT Normal cephalic, atraumatic without obvious deformity. Neck: Supple, No jugular venous distention/bruits. Lungs: Clear to auscultation, bilaterally without rales/wheezes/rhonchi with good respiratory effort. Heart: Regular rate and rhythm with Normal S1/S2 without murmurs, rubs or gallops  Mental status: Alert, oriented, thought content appropriate. CBC   Recent Labs     05/22/19  0714   HGB 10.6*   HCT 32.1*      RENAL  No results for input(s): NA, K, CL, CO2, PHOS, BUN, CREATININE in the last 72 hours. Invalid input(s): CA  LFT'S  No results for input(s): AST, ALT, ALB, BILIDIR, BILITOT, ALKPHOS in the last 72 hours. COAG  No results for input(s): INR in the last 72 hours. CARDIAC ENZYMES  No results for input(s): CKTOTAL, CKMB, CKMBINDEX, TROPONINI in the last 72 hours.     U/A:    Lab Results   Component Value Date    COLORU YELLOW 05/07/2019    WBCUA 0 TO 2 05/07/2019    RBCUA 2 TO 5 05/07/2019    MUCUS 1+ 05/07/2019    BACTERIA NOT REPORTED 05/07/2019    SPECGRAV 1.027 05/07/2019    LEUKOCYTESUR NEGATIVE 05/07/2019    GLUCOSEU NEGATIVE 05/07/2019    AMORPHOUS NOT REPORTED 05/07/2019       ABG  No results found for: DSJ8FCV, BEART, Q3HEXMID, PHART, THGBART, NLW5CTZ, PO2ART, TEE1IGE        Active Hospital Problems    Diagnosis Date Noted    Primary osteoarthritis of left knee [M17.12] 05/21/2019    BMI 25.0-25.9,adult [Z68.25] 05/11/2015    Hyperlipidemia with target LDL less than 130 [E78.5] 05/11/2015         ASSESSMENT/PLAN:    OA left knee s/p replacement - per ortho    D/c plan per ortho    DVT Prophylaxis: per ortho  Diet: DIET GENERAL;  Code Status: Full Code      Dispo - admitted; d/c home when appropriate per ortho      Josee Hobson MD   5/22/2019, 11:11 AM

## 2019-05-22 NOTE — DISCHARGE INSTR - DIET

## 2019-05-22 NOTE — PROGRESS NOTES
Post Operative Progress Note    2019 1:07 PM  Info:   Admit Date: 2019  PCP: Leandra Hassan MD      Medications:   Scheduled Meds:   scopolamine  1 patch Transdermal Once    sodium chloride flush  10 mL Intravenous 2 times per day    docusate sodium  100 mg Oral BID    aspirin  325 mg Oral BID    acetaminophen  1,000 mg Oral 3 times per day    ketorolac  15 mg Intravenous 3 times per day    atorvastatin  20 mg Oral Daily    calcium citrate  950 mg Oral BID    citalopram  20 mg Oral Daily    magnesium oxide  400 mg Oral Daily    therapeutic multivitamin-minerals  1 tablet Oral Daily    pantoprazole  40 mg Oral QAM AC    pramipexole  0.5 mg Oral Nightly     Continuous Infusions:   lactated ringers 125 mL/hr at 19 1825    lactated ringers 100 mL/hr at 19 0910    bupivacaine 0.125% 750 mL (19 0918)     PRN Meds:sodium chloride flush, oxyCODONE **OR** oxyCODONE, HYDROmorphone **OR** HYDROmorphone, ondansetron, melatonin ER, tiZANidine    Diet:   Diet: DIET GENERAL;    Subjective:   Systemic or Specific Complaints:No Complaints    Objective:     Patient Vitals for the past 24 hrs:   BP Temp Temp src Pulse Resp SpO2   19 1219 (!) 110/53 98.2 °F (36.8 °C) Oral 86 20 95 %   19 0730 113/62 98.1 °F (36.7 °C) Oral 86 20 93 %   19 0032 124/69 97.5 °F (36.4 °C) Oral 98 16 92 %   19 2033 (!) 113/46 99.1 °F (37.3 °C) Oral 99 16 92 %   19 1530 127/60 97.2 °F (36.2 °C) Oral 95 16 97 %         Wound: incision clean and dry without sign of infection   Motion: expected discomfort with range of motion in affected extremity   DVT Exam:  no evidence of DVT on physical examination         Data Review  CBC:   Recent Labs     19  0714   HGB 10.6*           Assessment:   Patient is S/P left Knee, Arthoplasty  Pain is well controlled. She has no nausea and no vomiting.     Current activity is up with assistance        Plan:      1:  Continue Physical Therapy  2:  Continue Deep venous thrombosis prophylaxis  3:  Continue Pain Control  4:  Discharge plans home       Electronically signed by Sahara Olivera MD on 5/22/2019 at 1:07 PM

## 2019-05-22 NOTE — CARE COORDINATION
Joint Replacement Navigator Daily Rounding Note    Admission Date:   5/21/2019 Malissa Stanton is a 68 y.o.  female    Post Op Day # 1    Labs:         Recent Labs     05/22/19  0714   HGB 10.6*     No results for input(s): NA, K, CL, CO2, BUN, CREATININE, GLUCOSE in the last 72 hours. Met with:     Patient and Family  Patient's LOC:   alert and oriented X3  Patient progress   Doing well  Patient's Pain:   Patient rates pain tolerable  Oral Intake:    good  Output:    adequate   Taveras in:   no  ON-Q:    yes    Walker/DME:  Walker/DME needed:  No  DME needed:    n/a   DME Agency:    n/a    Anticoagulation:  Anticoagulation:  ASA  Prescription in chart:  no     Continuity of Care: The [address] [place] form is not on the chart. The [address] [place] form is not signed by the physician. Discharge Planning:  Confirmed with patient that discharge plan is Outpatient Therapy. Agency/Facility chosen: [facility]  Awaiting pre-certification no  Anticipated discharge date: 5/22    Patient's questions and concerns were answered. Actively listened and provided reassurance.      Electronically signed by: Berta De Leon RN on 5/22/2019 at 11:12 AM

## 2019-05-30 LAB
EKG ATRIAL RATE: 77 BPM
EKG P AXIS: 47 DEGREES
EKG P-R INTERVAL: 172 MS
EKG Q-T INTERVAL: 394 MS
EKG QRS DURATION: 98 MS
EKG QTC CALCULATION (BAZETT): 445 MS
EKG R AXIS: -22 DEGREES
EKG T AXIS: 27 DEGREES
EKG VENTRICULAR RATE: 77 BPM

## 2019-06-05 ENCOUNTER — OFFICE VISIT (OUTPATIENT)
Dept: ORTHOPEDIC SURGERY | Age: 69
End: 2019-06-05

## 2019-06-05 DIAGNOSIS — Z96.652 STATUS POST TOTAL LEFT KNEE REPLACEMENT: Primary | ICD-10-CM

## 2019-06-05 DIAGNOSIS — M17.12 PRIMARY OSTEOARTHRITIS OF LEFT KNEE: ICD-10-CM

## 2019-06-05 PROCEDURE — 99024 POSTOP FOLLOW-UP VISIT: CPT | Performed by: ORTHOPAEDIC SURGERY

## 2019-06-05 RX ORDER — MELOXICAM 15 MG/1
15 TABLET ORAL DAILY
Qty: 30 TABLET | Refills: 6 | Status: SHIPPED | OUTPATIENT
Start: 2019-06-05 | End: 2019-10-29 | Stop reason: SDUPTHER

## 2019-06-05 RX ORDER — OXYCODONE HYDROCHLORIDE 5 MG/1
5 TABLET ORAL EVERY 4 HOURS PRN
Qty: 40 TABLET | Refills: 0 | Status: SHIPPED | OUTPATIENT
Start: 2019-06-05 | End: 2019-06-25 | Stop reason: SDUPTHER

## 2019-06-05 NOTE — PROGRESS NOTES
[facility]            [address] [place] [address]., [address] [facility], [address] [facility]             Dept Phone: [phone]             Dept Fax:  [phone]  Latisha Bridges returns today. She status post left total knee on 5/21/19. She says her knee feels great    Examination left knee notes her wound is pristine. Is no redness or drainage. She has motion of 0-110 degrees. There is no calf tenderness negative Homans. XR taken today:  Xr Knee Left (1-2 Views)    Result Date: 6/5/2019  X-rays taken today reviewed by me show the patient's left total knee in excellent position on AP lateral views. Impression  2 weeks status post left total knee    Plan  Patient is to participating in outpatient physical therapy. She's required very few more narcotics. We'll see her back here in 6 weeks. Call as any problems prior that time  Review of Systems   Constitutional: Negative. HENT: Negative. Respiratory: Negative. Cardiovascular: Negative. Neurological: Negative. Musculoskeletal:   Knee Pain (Left )          Current Outpatient Medications:     meloxicam (MOBIC) 15 MG tablet, Take 1 tablet by mouth daily, Disp: 30 tablet, Rfl: 6    oxyCODONE (ROXICODONE) 5 MG immediate release tablet, Take 1 tablet by mouth every 4 hours as needed for Pain for up to 7 days. , Disp: 40 tablet, Rfl: 0    aspirin 325 MG EC tablet, Take 1 tablet by mouth 2 times daily, Disp: 30 tablet, Rfl: 3    NONFORMULARY, , Disp: , Rfl:     melatonin 3 MG TABS tablet, Take 10 mg by mouth nightly as needed, Disp: , Rfl:     magnesium gluconate (MAGONATE) 500 MG tablet, Take 500 mg by mouth daily, Disp: , Rfl:     tiZANidine (ZANAFLEX) 4 MG tablet, Take 1 tablet by mouth nightly as needed (jaw pain), Disp: 90 tablet, Rfl: 0    CALCIUM CITRATE PO, Take 1,200 mg by mouth 2 times daily , Disp: , Rfl:     pramipexole (MIRAPEX) 0.5 MG tablet, TAKE ONE TABLET BY MOUTH ONCE

## 2019-06-10 ENCOUNTER — TELEPHONE (OUTPATIENT)
Dept: ORTHOPEDIC SURGERY | Age: 69
End: 2019-06-10

## 2019-06-10 RX ORDER — MELOXICAM 15 MG/1
15 TABLET ORAL DAILY
Qty: 30 TABLET | Refills: 3 | Status: SHIPPED | OUTPATIENT
Start: 2019-06-10 | End: 2019-09-30 | Stop reason: SDUPTHER

## 2019-06-10 NOTE — TELEPHONE ENCOUNTER
Patients mail in pharmacy would not refill her mobic script because we did not have refills on it. Per patient okay to send to [facility] [address]. Will send script to pharmacy.

## 2019-06-19 ENCOUNTER — TELEPHONE (OUTPATIENT)
Dept: ORTHOPEDIC SURGERY | Age: 69
End: 2019-06-19

## 2019-06-19 NOTE — TELEPHONE ENCOUNTER
Called patient and let her know to continue with the Mobic along with her pain medication. Ask her to call us back if she continues to have pain.

## 2019-06-19 NOTE — TELEPHONE ENCOUNTER
Patient is calling in, she is having a lot of pain. It's a really sharp pain at times. She stated she had Left Total Knee on 5/21/2019. She is currently taking Oxycodone and Mobic 15mg. She is wondering if she should stop the Mobic at this time and take an anti inflammatory medication. Her PT thinks she is experiencing nerve pain called bayron?     Please advise

## 2019-06-25 DIAGNOSIS — M17.12 PRIMARY OSTEOARTHRITIS OF LEFT KNEE: ICD-10-CM

## 2019-06-26 RX ORDER — OXYCODONE HYDROCHLORIDE 5 MG/1
5 TABLET ORAL EVERY 4 HOURS PRN
Qty: 42 TABLET | Refills: 0 | Status: SHIPPED | OUTPATIENT
Start: 2019-06-26 | End: 2019-07-03

## 2019-07-22 ENCOUNTER — OFFICE VISIT (OUTPATIENT)
Dept: ORTHOPEDIC SURGERY | Age: 69
End: 2019-07-22

## 2019-07-22 DIAGNOSIS — Z96.652 STATUS POST TOTAL LEFT KNEE REPLACEMENT: ICD-10-CM

## 2019-07-22 DIAGNOSIS — M17.12 PRIMARY OSTEOARTHRITIS OF LEFT KNEE: Primary | ICD-10-CM

## 2019-07-22 PROCEDURE — 99024 POSTOP FOLLOW-UP VISIT: CPT | Performed by: ORTHOPAEDIC SURGERY

## 2019-07-22 NOTE — PROGRESS NOTES
Ally Benitez M.D.            [address] [place] [address]., [address] [facility], [address] [facility]           Dept Phone: [phone]           Dept Fax:  [address]           [place]          Dept Phone: [phone]           Dept Fax:  [phone]  Manuel Fortune returns today. She is 8 weeks status post left total knee. She states that overall she has very little pain. Her only complaint is that it feels \"tight\" for her. When she is up and going though she has no problems. Examination patient's left knee notes her incisions pristine. She has no redness or drainage. Little bit of swelling but nothing out of the ordinary. Her motion is 0-115 degrees. She has good stability good patellar tracking no calf tenderness negative Homans. XR taken today: None today. Impression  8 weeks status post left total knee    Plan  Patient was encouraged to continue working on her motion. She does admit that she is not is stringent with her home program as she was an outpatient. I encouraged her to continue doing this. We will see her back here in 2 months. Call with any problems  Chief Complaint   Patient presents with    Knee Pain     Left         Review of Systems   Constitutional: Negative. HENT: Negative. Respiratory: Negative. Cardiovascular: Negative. Neurological: Negative.     Musculoskeletal:   Knee Pain (Left )          Current Outpatient Medications:     meloxicam (MOBIC) 15 MG tablet, Take 1 tablet by mouth daily, Disp: 30 tablet, Rfl: 3    meloxicam (MOBIC) 15 MG tablet, Take 1 tablet by mouth daily, Disp: 30 tablet, Rfl: 6    aspirin 325 MG EC tablet, Take 1 tablet by mouth 2 times daily, Disp: 30 tablet, Rfl: 3    NONFORMULARY, , Disp: , Rfl:     melatonin 3 MG TABS tablet, Take 10 mg by mouth nightly as needed, Disp: , Rfl:   

## 2019-07-31 DIAGNOSIS — Z79.2 PROPHYLACTIC ANTIBIOTIC: Primary | ICD-10-CM

## 2019-07-31 RX ORDER — AMOXICILLIN 500 MG/1
CAPSULE ORAL
Qty: 6 CAPSULE | Refills: 0 | Status: SHIPPED | OUTPATIENT
Start: 2019-07-31 | End: 2019-09-30 | Stop reason: SDUPTHER

## 2019-08-08 ENCOUNTER — HOSPITAL ENCOUNTER (OUTPATIENT)
Dept: WOMENS IMAGING | Age: 69
Discharge: HOME | End: 2019-08-10
Payer: MEDICARE

## 2019-08-08 DIAGNOSIS — Z13.9 ENCOUNTER FOR SCREENING: ICD-10-CM

## 2019-08-08 PROCEDURE — 77067 SCR MAMMO BI INCL CAD: CPT

## 2019-09-11 DIAGNOSIS — Z79.2 PROPHYLACTIC ANTIBIOTIC: Primary | ICD-10-CM

## 2019-09-11 RX ORDER — AMOXICILLIN 500 MG/1
CAPSULE ORAL
Qty: 6 CAPSULE | Refills: 0 | Status: SHIPPED | OUTPATIENT
Start: 2019-09-11 | End: 2020-10-30

## 2019-09-23 ENCOUNTER — OFFICE VISIT (OUTPATIENT)
Dept: ORTHOPEDIC SURGERY | Age: 69
End: 2019-09-23
Payer: MEDICARE

## 2019-09-23 VITALS — HEIGHT: 63 IN | WEIGHT: 145 LBS | BODY MASS INDEX: 25.69 KG/M2

## 2019-09-23 DIAGNOSIS — M17.12 PRIMARY OSTEOARTHRITIS OF LEFT KNEE: Primary | ICD-10-CM

## 2019-09-23 PROCEDURE — 1123F ACP DISCUSS/DSCN MKR DOCD: CPT | Performed by: ORTHOPAEDIC SURGERY

## 2019-09-23 PROCEDURE — 1036F TOBACCO NON-USER: CPT | Performed by: ORTHOPAEDIC SURGERY

## 2019-09-23 PROCEDURE — 1090F PRES/ABSN URINE INCON ASSESS: CPT | Performed by: ORTHOPAEDIC SURGERY

## 2019-09-23 PROCEDURE — G8400 PT W/DXA NO RESULTS DOC: HCPCS | Performed by: ORTHOPAEDIC SURGERY

## 2019-09-23 PROCEDURE — G8427 DOCREV CUR MEDS BY ELIG CLIN: HCPCS | Performed by: ORTHOPAEDIC SURGERY

## 2019-09-23 PROCEDURE — 4040F PNEUMOC VAC/ADMIN/RCVD: CPT | Performed by: ORTHOPAEDIC SURGERY

## 2019-09-23 PROCEDURE — 3017F COLORECTAL CA SCREEN DOC REV: CPT | Performed by: ORTHOPAEDIC SURGERY

## 2019-09-23 PROCEDURE — G8417 CALC BMI ABV UP PARAM F/U: HCPCS | Performed by: ORTHOPAEDIC SURGERY

## 2019-09-23 PROCEDURE — 99213 OFFICE O/P EST LOW 20 MIN: CPT | Performed by: ORTHOPAEDIC SURGERY

## 2019-09-23 NOTE — PROGRESS NOTES
3    NONFORMULARY, , Disp: , Rfl:     melatonin 3 MG TABS tablet, Take 10 mg by mouth nightly as needed, Disp: , Rfl:     magnesium gluconate (MAGONATE) 500 MG tablet, Take 500 mg by mouth daily, Disp: , Rfl:     tiZANidine (ZANAFLEX) 4 MG tablet, Take 1 tablet by mouth nightly as needed (jaw pain), Disp: 90 tablet, Rfl: 0    CALCIUM CITRATE PO, Take 1,200 mg by mouth 2 times daily , Disp: , Rfl:     pramipexole (MIRAPEX) 0.5 MG tablet, TAKE ONE TABLET BY MOUTH ONCE NIGHTLY, Disp: 90 tablet, Rfl: 3    citalopram (CELEXA) 20 MG tablet, TAKE ONE TABLET BY MOUTH ONCE DAILY, Disp: 90 tablet, Rfl: 3    atorvastatin (LIPITOR) 20 MG tablet, Take 1 tablet by mouth daily, Disp: 90 tablet, Rfl: 3    SUMAtriptan (IMITREX) 50 MG tablet, Take at onset of headache; may repeat in 2 hours; no more than 2 in 24 hours. , Disp: 9 tablet, Rfl: 11    Multiple Vitamins-Minerals (THERAPEUTIC MULTIVITAMIN-MINERALS) tablet, Take 1 tablet by mouth daily. , Disp: , Rfl:     omeprazole (PRILOSEC) 20 MG capsule, Take 20 mg by mouth daily.   , Disp: , Rfl:     Allergies   Allergen Reactions    Darvon [Propoxyphene] Nausea And Vomiting and Other (See Comments)     Severe headaches    Morphine Nausea And Vomiting and Other (See Comments)     Severe headaches       Social History     Socioeconomic History    Marital status:      Spouse name: Not on file    Number of children: Not on file    Years of education: Not on file    Highest education level: Not on file   Occupational History    Not on file   Social Needs    Financial resource strain: Not on file    Food insecurity:     Worry: Not on file     Inability: Not on file    Transportation needs:     Medical: Not on file     Non-medical: Not on file   Tobacco Use    Smoking status: Former Smoker     Packs/day: 0.50     Years: 3.00     Pack years: 1.50     Last attempt to quit:      Years since quittin.7    Smokeless tobacco: Never Used   Substance and Sexual

## 2019-09-30 ENCOUNTER — OFFICE VISIT (OUTPATIENT)
Dept: GASTROENTEROLOGY | Age: 69
End: 2019-09-30
Payer: MEDICARE

## 2019-09-30 VITALS
WEIGHT: 144 LBS | DIASTOLIC BLOOD PRESSURE: 92 MMHG | HEART RATE: 82 BPM | SYSTOLIC BLOOD PRESSURE: 143 MMHG | BODY MASS INDEX: 25.51 KG/M2

## 2019-09-30 DIAGNOSIS — K57.90 DIVERTICULOSIS OF INTESTINE WITHOUT BLEEDING, UNSPECIFIED INTESTINAL TRACT LOCATION: ICD-10-CM

## 2019-09-30 DIAGNOSIS — R19.7 DIARRHEA, UNSPECIFIED TYPE: ICD-10-CM

## 2019-09-30 DIAGNOSIS — D18.03 LIVER HEMANGIOMA: ICD-10-CM

## 2019-09-30 DIAGNOSIS — R74.8 ELEVATED LIVER ENZYMES: ICD-10-CM

## 2019-09-30 DIAGNOSIS — Z86.010 HX OF COLONIC POLYPS: ICD-10-CM

## 2019-09-30 DIAGNOSIS — D64.9 ANEMIA, UNSPECIFIED TYPE: Primary | ICD-10-CM

## 2019-09-30 DIAGNOSIS — K21.9 GASTROESOPHAGEAL REFLUX DISEASE WITHOUT ESOPHAGITIS: ICD-10-CM

## 2019-09-30 PROCEDURE — 1090F PRES/ABSN URINE INCON ASSESS: CPT | Performed by: INTERNAL MEDICINE

## 2019-09-30 PROCEDURE — 99214 OFFICE O/P EST MOD 30 MIN: CPT | Performed by: INTERNAL MEDICINE

## 2019-09-30 PROCEDURE — 1123F ACP DISCUSS/DSCN MKR DOCD: CPT | Performed by: INTERNAL MEDICINE

## 2019-09-30 PROCEDURE — 3017F COLORECTAL CA SCREEN DOC REV: CPT | Performed by: INTERNAL MEDICINE

## 2019-09-30 PROCEDURE — G8417 CALC BMI ABV UP PARAM F/U: HCPCS | Performed by: INTERNAL MEDICINE

## 2019-09-30 PROCEDURE — 4040F PNEUMOC VAC/ADMIN/RCVD: CPT | Performed by: INTERNAL MEDICINE

## 2019-09-30 PROCEDURE — 1036F TOBACCO NON-USER: CPT | Performed by: INTERNAL MEDICINE

## 2019-09-30 PROCEDURE — G8400 PT W/DXA NO RESULTS DOC: HCPCS | Performed by: INTERNAL MEDICINE

## 2019-09-30 PROCEDURE — G8427 DOCREV CUR MEDS BY ELIG CLIN: HCPCS | Performed by: INTERNAL MEDICINE

## 2019-09-30 ASSESSMENT — ENCOUNTER SYMPTOMS
ANAL BLEEDING: 0
ABDOMINAL DISTENTION: 0
WHEEZING: 0
DIARRHEA: 1
TROUBLE SWALLOWING: 0
ABDOMINAL PAIN: 0
RECTAL PAIN: 0
CHOKING: 0
VOMITING: 0
CONSTIPATION: 0
COUGH: 0
BLOOD IN STOOL: 0
NAUSEA: 0

## 2019-09-30 NOTE — PROGRESS NOTES
of club or organization: Not on file     Attends meetings of clubs or organizations: Not on file     Relationship status: Not on file    Intimate partner violence:     Fear of current or ex partner: Not on file     Emotionally abused: Not on file     Physically abused: Not on file     Forced sexual activity: Not on file   Other Topics Concern    Not on file   Social History Narrative    Not on file       REVIEW OF SYSTEMS: A 12-point review of systemswas obtained and pertinent positives and negatives were enumerated above in the history of present illness. All other reviewed systems / symptoms were negative. Review of Systems   Constitutional: Negative for appetite change, fatigue and unexpected weight change. HENT: Negative for trouble swallowing. Respiratory: Negative for cough, choking and wheezing. Cardiovascular: Negative for chest pain, palpitations and leg swelling. Gastrointestinal: Positive for diarrhea (only when eating fried foods). Negative for abdominal distention, abdominal pain, anal bleeding, blood in stool, constipation, nausea, rectal pain and vomiting. Genitourinary: Negative for difficulty urinating. Allergic/Immunologic: Negative for environmental allergies and food allergies. Neurological: Negative for dizziness, weakness, light-headedness, numbness and headaches. Hematological: Does not bruise/bleed easily. Psychiatric/Behavioral: Negative for sleep disturbance. The patient is not nervous/anxious.             LABORATORY DATA: Reviewed  Lab Results   Component Value Date    WBC 7.0 05/07/2019    HGB 10.6 (L) 05/22/2019    HCT 32.1 (L) 05/22/2019    MCV 89.7 05/07/2019     05/07/2019     05/07/2019    K 4.8 05/07/2019     05/07/2019    CO2 20 05/07/2019    BUN 18 05/07/2019    CREATININE 0.77 05/07/2019    LABPROT 6.9 06/05/2012    LABALBU 4.2 01/18/2019    BILITOT 0.49 01/18/2019    ALKPHOS 75 01/18/2019    AST 18 01/18/2019    ALT 19 01/18/2019 Lab Results   Component Value Date    RBC 4.63 05/07/2019    HGB 10.6 (L) 05/22/2019    MCV 89.7 05/07/2019    MCH 29.4 05/07/2019    MCHC 32.8 05/07/2019    RDW 14.3 05/07/2019    MPV 8.1 05/07/2019    BASOPCT 0 05/07/2019    LYMPHSABS 2.50 05/07/2019    MONOSABS 0.90 05/07/2019    NEUTROABS 3.60 05/07/2019    EOSABS 0.00 05/07/2019    BASOSABS 0.00 05/07/2019         DIAGNOSTIC TESTING:     No results found. PHYSICAL EXAMINATION: Vital signs reviewed per the nursing documentation. BP (!) 143/92   Pulse 82   Wt 144 lb (65.3 kg)   BMI 25.51 kg/m²   Body mass index is 25.51 kg/m². Physical Exam   Constitutional: She is oriented to person, place, and time. She appears well-developed and well-nourished. No distress. HENT:   Head: Normocephalic. Mouth/Throat: No oropharyngeal exudate. Eyes: Pupils are equal, round, and reactive to light. No scleral icterus. Neck: Normal range of motion. Neck supple. No JVD present. No tracheal deviation present. No thyromegaly present. Cardiovascular: Normal rate, regular rhythm, normal heart sounds and intact distal pulses. No murmur heard. Pulmonary/Chest: Effort normal and breath sounds normal. No respiratory distress. She has no wheezes. Abdominal: Soft. Bowel sounds are normal. She exhibits no distension. There is no tenderness. There is no rebound and no guarding. No ascites   Musculoskeletal: Normal range of motion. She exhibits no edema. Neurological: She is alert and oriented to person, place, and time. She has normal reflexes. Skin: Skin is warm. No rash noted. She is not diaphoretic. No erythema. No pallor. She is not diaphoretic   Psychiatric: She has a normal mood and affect. Her behavior is normal. Judgment and thought content normal.   Nursing note and vitals reviewed. IMPRESSION: Ms. Suyapa Almaguer is a 68 y.o. female with      Diagnosis Orders   1. Anemia, unspecified type  Iron and TIBC    Vitamin B12    CBC    Folate   2.

## 2019-10-02 ENCOUNTER — HOSPITAL ENCOUNTER (OUTPATIENT)
Age: 69
Discharge: HOME | End: 2019-10-02
Payer: MEDICARE

## 2019-10-02 DIAGNOSIS — D64.9 ANEMIA, UNSPECIFIED TYPE: ICD-10-CM

## 2019-10-02 LAB
FOLATE: >20 NG/ML
HCT VFR BLD CALC: 41.2 % (ref 36–46)
HEMOGLOBIN: 13.5 G/DL (ref 12–16)
IRON SATURATION: 30 % (ref 20–55)
IRON: 95 UG/DL (ref 37–145)
MCH RBC QN AUTO: 29.4 PG (ref 26–34)
MCHC RBC AUTO-ENTMCNC: 32.8 G/DL (ref 31–37)
MCV RBC AUTO: 89.6 FL (ref 80–100)
NRBC AUTOMATED: ABNORMAL PER 100 WBC
PDW BLD-RTO: 15.7 % (ref 11.5–14.9)
PLATELET # BLD: 253 K/UL (ref 150–450)
PMV BLD AUTO: 8.6 FL (ref 6–12)
RBC # BLD: 4.6 M/UL (ref 4–5.2)
TOTAL IRON BINDING CAPACITY: 313 UG/DL (ref 250–450)
UNSATURATED IRON BINDING CAPACITY: 218 UG/DL (ref 112–347)
VITAMIN B-12: 283 PG/ML (ref 232–1245)
WBC # BLD: 8 K/UL (ref 3.5–11)

## 2019-10-02 PROCEDURE — 82607 VITAMIN B-12: CPT

## 2019-10-02 PROCEDURE — 36415 COLL VENOUS BLD VENIPUNCTURE: CPT

## 2019-10-02 PROCEDURE — 85027 COMPLETE CBC AUTOMATED: CPT

## 2019-10-02 PROCEDURE — 83540 ASSAY OF IRON: CPT

## 2019-10-02 PROCEDURE — 82746 ASSAY OF FOLIC ACID SERUM: CPT

## 2019-10-02 PROCEDURE — 83550 IRON BINDING TEST: CPT

## 2019-10-29 DIAGNOSIS — M17.12 PRIMARY OSTEOARTHRITIS OF LEFT KNEE: Primary | ICD-10-CM

## 2019-10-29 RX ORDER — MELOXICAM 15 MG/1
15 TABLET ORAL DAILY
Qty: 90 TABLET | Refills: 3 | Status: SHIPPED | OUTPATIENT
Start: 2019-10-29 | End: 2020-09-14 | Stop reason: SDUPTHER

## 2020-02-03 ENCOUNTER — OFFICE VISIT (OUTPATIENT)
Dept: GASTROENTEROLOGY | Age: 70
End: 2020-02-03
Payer: MEDICARE

## 2020-02-03 VITALS — BODY MASS INDEX: 27.28 KG/M2 | WEIGHT: 154 LBS

## 2020-02-03 PROCEDURE — G8400 PT W/DXA NO RESULTS DOC: HCPCS | Performed by: INTERNAL MEDICINE

## 2020-02-03 PROCEDURE — 1090F PRES/ABSN URINE INCON ASSESS: CPT | Performed by: INTERNAL MEDICINE

## 2020-02-03 PROCEDURE — G8417 CALC BMI ABV UP PARAM F/U: HCPCS | Performed by: INTERNAL MEDICINE

## 2020-02-03 PROCEDURE — G8427 DOCREV CUR MEDS BY ELIG CLIN: HCPCS | Performed by: INTERNAL MEDICINE

## 2020-02-03 PROCEDURE — 4040F PNEUMOC VAC/ADMIN/RCVD: CPT | Performed by: INTERNAL MEDICINE

## 2020-02-03 PROCEDURE — 1036F TOBACCO NON-USER: CPT | Performed by: INTERNAL MEDICINE

## 2020-02-03 PROCEDURE — G8482 FLU IMMUNIZE ORDER/ADMIN: HCPCS | Performed by: INTERNAL MEDICINE

## 2020-02-03 PROCEDURE — 99214 OFFICE O/P EST MOD 30 MIN: CPT | Performed by: INTERNAL MEDICINE

## 2020-02-03 PROCEDURE — 1123F ACP DISCUSS/DSCN MKR DOCD: CPT | Performed by: INTERNAL MEDICINE

## 2020-02-03 PROCEDURE — 3017F COLORECTAL CA SCREEN DOC REV: CPT | Performed by: INTERNAL MEDICINE

## 2020-02-03 RX ORDER — MAGNESIUM OXIDE/MAG AA CHELATE 300 MG
300 CAPSULE ORAL DAILY
COMMUNITY
End: 2020-12-14 | Stop reason: DRUGHIGH

## 2020-02-03 ASSESSMENT — ENCOUNTER SYMPTOMS
BACK PAIN: 1
COUGH: 0
CONSTIPATION: 0
ABDOMINAL DISTENTION: 0
WHEEZING: 0
RECTAL PAIN: 0
BLOOD IN STOOL: 0
TROUBLE SWALLOWING: 0
VOMITING: 0
NAUSEA: 1
CHOKING: 0
DIARRHEA: 0
ANAL BLEEDING: 0
ABDOMINAL PAIN: 0

## 2020-02-03 NOTE — PROGRESS NOTES
Attends Nondenominational service: Not on file     Active member of club or organization: Not on file     Attends meetings of clubs or organizations: Not on file     Relationship status: Not on file    Intimate partner violence:     Fear of current or ex partner: Not on file     Emotionally abused: Not on file     Physically abused: Not on file     Forced sexual activity: Not on file   Other Topics Concern    Not on file   Social History Narrative    Not on file       REVIEW OF SYSTEMS: A 12-point review of systemswas obtained and pertinent positives and negatives were enumerated above in the history of present illness. All other reviewed systems / symptoms were negative. Review of Systems   Constitutional: Positive for fatigue. Negative for appetite change and unexpected weight change. HENT: Negative for trouble swallowing. Eyes: Positive for visual disturbance (glasses). Respiratory: Negative for cough, choking and wheezing. Cardiovascular: Negative for chest pain, palpitations and leg swelling. Gastrointestinal: Positive for nausea. Negative for abdominal distention, abdominal pain, anal bleeding, blood in stool, constipation, diarrhea, rectal pain and vomiting. Genitourinary: Negative for difficulty urinating. Musculoskeletal: Positive for back pain and joint swelling. Allergic/Immunologic: Negative for environmental allergies and food allergies. Neurological: Negative for dizziness, weakness, light-headedness, numbness and headaches. Hematological: Bruises/bleeds easily. Psychiatric/Behavioral: Negative for sleep disturbance. The patient is not nervous/anxious.             LABORATORY DATA: Reviewed  Lab Results   Component Value Date    WBC 8.0 10/02/2019    HGB 13.5 10/02/2019    HCT 41.2 10/02/2019    MCV 89.6 10/02/2019     10/02/2019     05/07/2019    K 4.8 05/07/2019     05/07/2019    CO2 20 05/07/2019    BUN 18 05/07/2019    CREATININE 0.77 05/07/2019    LABPROT 6.9 06/05/2012    LABALBU 4.2 01/18/2019    BILITOT 0.49 01/18/2019    ALKPHOS 75 01/18/2019    AST 18 01/18/2019    ALT 19 01/18/2019         Lab Results   Component Value Date    RBC 4.60 10/02/2019    HGB 13.5 10/02/2019    MCV 89.6 10/02/2019    MCH 29.4 10/02/2019    MCHC 32.8 10/02/2019    RDW 15.7 (H) 10/02/2019    MPV 8.6 10/02/2019    BASOPCT 0 05/07/2019    LYMPHSABS 2.50 05/07/2019    MONOSABS 0.90 05/07/2019    NEUTROABS 3.60 05/07/2019    EOSABS 0.00 05/07/2019    BASOSABS 0.00 05/07/2019         DIAGNOSTIC TESTING:     No results found. PHYSICAL EXAMINATION: Vital signs reviewed per the nursing documentation. Wt 154 lb (69.9 kg)   BMI 27.28 kg/m²   Body mass index is 27.28 kg/m². Physical Exam  Vitals signs and nursing note reviewed. Constitutional:       General: She is not in acute distress. Appearance: She is well-developed. She is not diaphoretic. HENT:      Head: Normocephalic. Mouth/Throat:      Pharynx: No oropharyngeal exudate. Eyes:      General: No scleral icterus. Pupils: Pupils are equal, round, and reactive to light. Neck:      Musculoskeletal: Normal range of motion and neck supple. Thyroid: No thyromegaly. Vascular: No JVD. Trachea: No tracheal deviation. Cardiovascular:      Rate and Rhythm: Normal rate and regular rhythm. Heart sounds: Normal heart sounds. No murmur. Pulmonary:      Effort: Pulmonary effort is normal. No respiratory distress. Breath sounds: Normal breath sounds. No wheezing. Abdominal:      General: Bowel sounds are normal. There is no distension. Palpations: Abdomen is soft. Tenderness: There is no abdominal tenderness. There is no guarding or rebound. Comments: No ascites   Musculoskeletal: Normal range of motion. Skin:     General: Skin is warm. Coloration: Skin is not pale. Findings: No erythema or rash.       Comments: She is not diaphoretic   Neurological:      Mental

## 2020-02-15 ASSESSMENT — KOOS JR
TWISING OR PIVOTING ON KNEE: 2
GOING UP OR DOWN STAIRS: 2
HOW SEVERE IS YOUR KNEE STIFFNESS AFTER FIRST WAKING IN MORNING: 2
STANDING UPRIGHT: 1
STRAIGHTENING KNEE FULLY: 1
RISING FROM SITTING: 2
BENDING TO THE FLOOR TO PICK UP OBJECT: 2

## 2020-02-15 ASSESSMENT — PROMIS GLOBAL HEALTH SCALE
IN THE PAST 7 DAYS, HOW WOULD YOU RATE YOUR FATIGUE ON AVERAGE [ON A SCALE FROM 1 (NONE) TO 5 (VERY SEVERE)]?: 3
IN GENERAL, HOW WOULD YOU RATE YOUR PHYSICAL HEALTH [ON A SCALE OF 1 (POOR) TO 5 (EXCELLENT)]?: 3
IN THE PAST 7 DAYS, HOW WOULD YOU RATE YOUR PAIN ON AVERAGE [ON A SCALE FROM 0 (NO PAIN) TO 10 (WORST IMAGINABLE PAIN)]?: 2
TO WHAT EXTENT ARE YOU ABLE TO CARRY OUT YOUR EVERYDAY PHYSICAL ACTIVITIES SUCH AS WALKING, CLIMBING STAIRS, CARRYING GROCERIES, OR MOVING A CHAIR [ON A SCALE OF 1 (NOT AT ALL) TO 5 (COMPLETELY)]?: 4
WHO IS THE PERSON COMPLETING THE PROMIS V1.1 SURVEY?: 0
IN GENERAL, HOW WOULD YOU RATE YOUR MENTAL HEALTH, INCLUDING YOUR MOOD AND YOUR ABILITY TO THINK [ON A SCALE OF 1 (POOR) TO 5 (EXCELLENT)]?: 2
IN GENERAL, PLEASE RATE HOW WELL YOU CARRY OUT YOUR USUAL SOCIAL ACTIVITIES (INCLUDES ACTIVITIES AT HOME, AT WORK, AND IN YOUR COMMUNITY, AND RESPONSIBILITIES AS A PARENT, CHILD, SPOUSE, EMPLOYEE, FRIEND, ETC) [ON A SCALE OF 1 (POOR) TO 5 (EXCELLENT)]?: 3
IN GENERAL, WOULD YOU SAY YOUR HEALTH IS...[ON A SCALE OF 1 (POOR) TO 5 (EXCELLENT)]: 3
HOW IS THE PROMIS V1.1 BEING ADMINISTERED?: 2
IN GENERAL, HOW WOULD YOU RATE YOUR SATISFACTION WITH YOUR SOCIAL ACTIVITIES AND RELATIONSHIPS [ON A SCALE OF 1 (POOR) TO 5 (EXCELLENT)]?: 3
IN THE PAST 7 DAYS, HOW OFTEN HAVE YOU BEEN BOTHERED BY EMOTIONAL PROBLEMS, SUCH AS FEELING ANXIOUS, DEPRESSED, OR IRRITABLE [ON A SCALE FROM 1 (NEVER) TO 5 (ALWAYS)]?: 3
IN GENERAL, WOULD YOU SAY YOUR QUALITY OF LIFE IS...[ON A SCALE OF 1 (POOR) TO 5 (EXCELLENT)]: 3

## 2020-02-24 ENCOUNTER — OFFICE VISIT (OUTPATIENT)
Dept: ORTHOPEDIC SURGERY | Age: 70
End: 2020-02-24
Payer: MEDICARE

## 2020-02-24 PROCEDURE — G8428 CUR MEDS NOT DOCUMENT: HCPCS | Performed by: ORTHOPAEDIC SURGERY

## 2020-02-24 PROCEDURE — G8400 PT W/DXA NO RESULTS DOC: HCPCS | Performed by: ORTHOPAEDIC SURGERY

## 2020-02-24 PROCEDURE — 99213 OFFICE O/P EST LOW 20 MIN: CPT | Performed by: ORTHOPAEDIC SURGERY

## 2020-02-24 PROCEDURE — G8482 FLU IMMUNIZE ORDER/ADMIN: HCPCS | Performed by: ORTHOPAEDIC SURGERY

## 2020-02-24 PROCEDURE — G8417 CALC BMI ABV UP PARAM F/U: HCPCS | Performed by: ORTHOPAEDIC SURGERY

## 2020-02-24 PROCEDURE — 1036F TOBACCO NON-USER: CPT | Performed by: ORTHOPAEDIC SURGERY

## 2020-02-24 PROCEDURE — 1123F ACP DISCUSS/DSCN MKR DOCD: CPT | Performed by: ORTHOPAEDIC SURGERY

## 2020-02-24 PROCEDURE — 3017F COLORECTAL CA SCREEN DOC REV: CPT | Performed by: ORTHOPAEDIC SURGERY

## 2020-02-24 PROCEDURE — 1090F PRES/ABSN URINE INCON ASSESS: CPT | Performed by: ORTHOPAEDIC SURGERY

## 2020-02-24 PROCEDURE — 4040F PNEUMOC VAC/ADMIN/RCVD: CPT | Performed by: ORTHOPAEDIC SURGERY

## 2020-02-24 NOTE — PROGRESS NOTES
Krissy Allen M.D.            [address] [place] [address]., [address] [facility] [address]           Dept Phone: [phone]           Dept Fax:  [phone]           [place]          Dept Phone: [phone]           Dept Fax:  [phone]      Chief Compliant:  Chief Complaint   Patient presents with    Follow-up     left knee pain        History of Present Illness:  Ivan Trujillo returns today. This is a 65 y.o. female who presents to the clinic today for left knee pain. Patient is status post left TKA on 5/21/19. She was doing well however had a recent fall. She was playing with a dog and tripped on a patch of snowy ice and she fell with her knee flexed back about 1.5 weeks ago. She notes that she is just sore. Review of Systems   Constitutional: Negative for fever, chills, sweats, recent illness, or recent injury. Neurological: Negative for headaches, numbness, or weakness. Integumentary: Negative for rash, itching, ecchymosis, abrasions, or laceration. Musculoskeletal: Positive for Follow-up (left knee pain)       Physical Exam:  Constitutional: Patient is oriented to person, place, and time. Patient appears well-developed and well nourished. HENT: Negative otherwise noted  Head: Normocephalic and Atraumatic  Nose: Normal  Eyes: Conjunctivae and EOM are normal  Neck: Normal range of motion Neck supple. Respiratory/Cardio: Effort normal. No respiratory distress. Musculoskeletal:  Left Knee 0-120 degrees. Good patellar tracking, Stable. Neurovascularly intact. Neurological: Patient is alert and oriented to person, place, and time. Normal strenght. No sensory deficit. Skin: Skin is warm and dry  Psychiatric: Behavior is normal. Thought content normal.  Nursing note and vitals reviewed.      Labs and Imaging:     XR taken today:  Xr Knee Left (1-2 Views)    Result Date: 2/24/2020  Rays taken today reviewed by me show AP lateral views the patient's left total knee. Components appear to be in good position on both views. There does not appear to be evidence for any acute process     Assessment and Plan:  1. History of total left knee replacement        This is a 65 y.o. female who presents to the clinic today for follow up acute left knee pain. She fell about a week and a half ago. She is doing well otherwise. She was advised to follow up in one year (May 2021) as we got XR today. Past History:    Current Outpatient Medications:     Magnesium 300 MG CAPS, Take 30 mg by mouth 2 times daily, Disp: , Rfl:     meloxicam (MOBIC) 15 MG tablet, Take 1 tablet by mouth daily, Disp: 90 tablet, Rfl: 3    pramipexole (MIRAPEX) 0.5 MG tablet, TAKE ONE TABLET BY MOUTH ONCE NIGHTLY, Disp: 90 tablet, Rfl: 3    citalopram (CELEXA) 20 MG tablet, TAKE ONE TABLET BY MOUTH ONCE DAILY, Disp: 90 tablet, Rfl: 3    atorvastatin (LIPITOR) 20 MG tablet, Take 1 tablet by mouth daily, Disp: 90 tablet, Rfl: 3    SUMAtriptan (IMITREX) 50 MG tablet, Take at onset of headache; may repeat in 2 hours; no more than 2 in 24 hours. , Disp: 9 tablet, Rfl: 11    amoxicillin (AMOXIL) 500 MG capsule, Take 2 capsules 1 hour prior to appointment, then 1 capsule twice a day until gone, Disp: 6 capsule, Rfl: 0    NONFORMULARY, , Disp: , Rfl:     melatonin 3 MG TABS tablet, Take 10 mg by mouth nightly as needed, Disp: , Rfl:     magnesium gluconate (MAGONATE) 500 MG tablet, Take 500 mg by mouth daily, Disp: , Rfl:     tiZANidine (ZANAFLEX) 4 MG tablet, Take 1 tablet by mouth nightly as needed (jaw pain), Disp: 90 tablet, Rfl: 0    CALCIUM CITRATE PO, Take 1,200 mg by mouth 2 times daily calciu, Disp: , Rfl:     Multiple Vitamins-Minerals (THERAPEUTIC MULTIVITAMIN-MINERALS) tablet, Take 1 tablet by mouth daily. , Disp: , Rfl:     omeprazole (PRILOSEC) 20 MG capsule, Take 20 mg by mouth daily.   , Disp: , Rfl:   Allergies   Allergen Reactions    Darvon [Propoxyphene] Nausea And Vomiting and Other (See Comments)     Severe headaches    Morphine Nausea And Vomiting and Other (See Comments)     Severe headaches     Social History     Socioeconomic History    Marital status:      Spouse name: Not on file    Number of children: Not on file    Years of education: Not on file    Highest education level: Not on file   Occupational History    Not on file   Social Needs    Financial resource strain: Not on file    Food insecurity:     Worry: Not on file     Inability: Not on file    Transportation needs:     Medical: Not on file     Non-medical: Not on file   Tobacco Use    Smoking status: Former Smoker     Packs/day: 0.50     Years: 3.00     Pack years: 1.50     Last attempt to quit:      Years since quittin.1    Smokeless tobacco: Never Used   Substance and Sexual Activity    Alcohol use: Yes     Comment: 1-2 per week    Drug use: No    Sexual activity: Not on file   Lifestyle    Physical activity:     Days per week: Not on file     Minutes per session: Not on file    Stress: Not on file   Relationships    Social connections:     Talks on phone: Not on file     Gets together: Not on file     Attends Evangelical service: Not on file     Active member of club or organization: Not on file     Attends meetings of clubs or organizations: Not on file     Relationship status: Not on file    Intimate partner violence:     Fear of current or ex partner: Not on file     Emotionally abused: Not on file     Physically abused: Not on file     Forced sexual activity: Not on file   Other Topics Concern    Not on file   Social History Narrative    Not on file     Past Medical History:   Diagnosis Date    Arthritis     Bone spur of left foot     Breast cancer ([facility] [address]     left with reconstruction, no chemo or radiation    Colon polyp     Depression     Diverticulosis     Elevated liver enzymes     GERD (gastroesophageal reflux disease)     Hemorrhoids     Hyperlipidemia     Liver hemangioma     Migraine headache     Osteopenia     Overweight (BMI 25.0-29.9) 5/11/2015    Restless legs syndrome (RLS)     on Mirapex    Tubular adenoma      Past Surgical History:   Procedure Laterality Date    BREAST BIOPSY Left     cancer found    BREAST RECONSTRUCTION Left     using abdominal tissue    CATARACT REMOVAL WITH IMPLANT Bilateral     COLONOSCOPY      COLONOSCOPY  2 10 15    DIVERTICULOSIS AND HEMORRHOIDS , TUBULAR ADENOMA     COLONOSCOPY N/A 2/21/2018    [facility]     remainder of breast was removed because the margins were not clear on the partial mastectomy    MASTECTOMY, PARTIAL Left     for cancer    NV REMOVAL OF HEEL SPUR Left 11/5/2018    EXOSTECTOMY DORSAL FOOT performed by Ever Kennedy DPM at [address]      L3,4,5, fusion   [address] Left 5/21/2019    KNEE TOTAL ARTHROPLASTY performed by Nichol Roberts MD at [address]       Family History   Problem Relation Age of Onset    Ovarian Cancer Mother     Heart Attack Father     Breast Cancer Maternal Grandmother           Scribe Attestation:  By signing my name below, I, Maggie Lee, attest that this documentation has been prepared under the direction and in the presence of Dr. Jing Mcghee. Electronically signed: Shaq Gutierrez, 2/24/20     Please note that this chart was generated using voice recognition Dragon dictation software. Although every effort was made to ensure the accuracy of this automated transcription, some errors in transcription may have occurred.

## 2020-03-25 PROBLEM — K21.9 GERD (GASTROESOPHAGEAL REFLUX DISEASE): Status: RESOLVED | Noted: 2020-03-25 | Resolved: 2020-03-24

## 2020-07-01 ENCOUNTER — HOSPITAL ENCOUNTER (OUTPATIENT)
Age: 70
Discharge: HOME | End: 2020-07-01
Payer: MEDICARE

## 2020-07-01 LAB
ALBUMIN SERPL-MCNC: 4.2 G/DL (ref 3.5–5.2)
ALBUMIN/GLOBULIN RATIO: ABNORMAL (ref 1–2.5)
ALP BLD-CCNC: 86 U/L (ref 35–104)
ALT SERPL-CCNC: 28 U/L (ref 5–33)
ANION GAP SERPL CALCULATED.3IONS-SCNC: 13 MMOL/L (ref 9–17)
AST SERPL-CCNC: 18 U/L
BILIRUB SERPL-MCNC: 0.38 MG/DL (ref 0.3–1.2)
BUN BLDV-MCNC: 14 MG/DL (ref 8–23)
BUN/CREAT BLD: ABNORMAL (ref 9–20)
CALCIUM SERPL-MCNC: 9.3 MG/DL (ref 8.6–10.4)
CHLORIDE BLD-SCNC: 108 MMOL/L (ref 98–107)
CHOLESTEROL/HDL RATIO: 3.5
CHOLESTEROL: 211 MG/DL
CO2: 23 MMOL/L (ref 20–31)
CREAT SERPL-MCNC: 0.89 MG/DL (ref 0.5–0.9)
GFR AFRICAN AMERICAN: >60 ML/MIN
GFR NON-AFRICAN AMERICAN: >60 ML/MIN
GFR SERPL CREATININE-BSD FRML MDRD: ABNORMAL ML/MIN/{1.73_M2}
GFR SERPL CREATININE-BSD FRML MDRD: ABNORMAL ML/MIN/{1.73_M2}
GLUCOSE BLD-MCNC: 132 MG/DL (ref 70–99)
HDLC SERPL-MCNC: 60 MG/DL
LDL CHOLESTEROL: 92 MG/DL (ref 0–130)
POTASSIUM SERPL-SCNC: 4.2 MMOL/L (ref 3.7–5.3)
SODIUM BLD-SCNC: 144 MMOL/L (ref 135–144)
TOTAL PROTEIN: 7.1 G/DL (ref 6.4–8.3)
TRIGL SERPL-MCNC: 296 MG/DL
VLDLC SERPL CALC-MCNC: ABNORMAL MG/DL (ref 1–30)

## 2020-07-01 PROCEDURE — 36415 COLL VENOUS BLD VENIPUNCTURE: CPT

## 2020-07-01 PROCEDURE — 80053 COMPREHEN METABOLIC PANEL: CPT

## 2020-07-01 PROCEDURE — 80061 LIPID PANEL: CPT

## 2020-07-23 ENCOUNTER — HOSPITAL ENCOUNTER (OUTPATIENT)
Age: 70
Discharge: HOME | End: 2020-07-23
Payer: MEDICARE

## 2020-07-23 ENCOUNTER — OFFICE VISIT (OUTPATIENT)
Dept: PODIATRY | Age: 70
End: 2020-07-23
Payer: MEDICARE

## 2020-07-23 VITALS — BODY MASS INDEX: 26.58 KG/M2 | WEIGHT: 150 LBS | HEIGHT: 63 IN

## 2020-07-23 LAB
HCT VFR BLD CALC: 43.4 % (ref 36–46)
HEMOGLOBIN: 14.2 G/DL (ref 12–16)
MCH RBC QN AUTO: 29.6 PG (ref 26–34)
MCHC RBC AUTO-ENTMCNC: 32.8 G/DL (ref 31–37)
MCV RBC AUTO: 90.4 FL (ref 80–100)
NRBC AUTOMATED: NORMAL PER 100 WBC
PDW BLD-RTO: 14.1 % (ref 11.5–14.9)
PLATELET # BLD: 217 K/UL (ref 150–450)
PMV BLD AUTO: 8.7 FL (ref 6–12)
RBC # BLD: 4.8 M/UL (ref 4–5.2)
SEDIMENTATION RATE, ERYTHROCYTE: 5 MM (ref 0–20)
URIC ACID: 5.2 MG/DL (ref 2.4–5.7)
WBC # BLD: 8.7 K/UL (ref 3.5–11)

## 2020-07-23 PROCEDURE — G8427 DOCREV CUR MEDS BY ELIG CLIN: HCPCS | Performed by: PODIATRIST

## 2020-07-23 PROCEDURE — 85027 COMPLETE CBC AUTOMATED: CPT

## 2020-07-23 PROCEDURE — 3017F COLORECTAL CA SCREEN DOC REV: CPT | Performed by: PODIATRIST

## 2020-07-23 PROCEDURE — 4040F PNEUMOC VAC/ADMIN/RCVD: CPT | Performed by: PODIATRIST

## 2020-07-23 PROCEDURE — G8400 PT W/DXA NO RESULTS DOC: HCPCS | Performed by: PODIATRIST

## 2020-07-23 PROCEDURE — 36415 COLL VENOUS BLD VENIPUNCTURE: CPT

## 2020-07-23 PROCEDURE — 85651 RBC SED RATE NONAUTOMATED: CPT

## 2020-07-23 PROCEDURE — 84550 ASSAY OF BLOOD/URIC ACID: CPT

## 2020-07-23 PROCEDURE — 1036F TOBACCO NON-USER: CPT | Performed by: PODIATRIST

## 2020-07-23 PROCEDURE — 1090F PRES/ABSN URINE INCON ASSESS: CPT | Performed by: PODIATRIST

## 2020-07-23 PROCEDURE — G8417 CALC BMI ABV UP PARAM F/U: HCPCS | Performed by: PODIATRIST

## 2020-07-23 PROCEDURE — 1123F ACP DISCUSS/DSCN MKR DOCD: CPT | Performed by: PODIATRIST

## 2020-07-23 PROCEDURE — 99213 OFFICE O/P EST LOW 20 MIN: CPT | Performed by: PODIATRIST

## 2020-07-23 RX ORDER — PREDNISONE 10 MG/1
10 TABLET ORAL DAILY
Qty: 21 EACH | Refills: 0 | Status: SHIPPED | OUTPATIENT
Start: 2020-07-23 | End: 2020-10-30

## 2020-07-23 NOTE — PROGRESS NOTES
[facility]  Return Patient     Zaina Padilla 65 y.o. female that presents for follow-up of   Chief Complaint   Patient presents with    Foot Pain     LEFT FOOT PAIN ACROSS THE ARCH OF FOOT X1 WEEK, NO INJURY NO CURRENT X-RAYS     I did a dorsal exostectomy in 2018. Pain left foot came on about a weeks ago. She has been doing well, no pain since her surgery. Woke up with pain, no increase in activity. No injury, no change in shoes, no treatment. Feels good in sturdy shoe, and if she only puts pressure on her heel  Currently denies F/C/N/V. Allergies   Allergen Reactions    Darvon [Propoxyphene] Nausea And Vomiting and Other (See Comments)     Severe headaches    Morphine Nausea And Vomiting and Other (See Comments)     Severe headaches       Past Medical History:   Diagnosis Date    Arthritis     Bone spur of left foot     Breast cancer ([facility] [address] 1998    left with reconstruction, no chemo or radiation    Colon polyp     Depression     Diverticulosis     Elevated liver enzymes     GERD (gastroesophageal reflux disease)     Hemorrhoids     Hyperlipidemia     Liver hemangioma     Migraine headache     Osteopenia     Overweight (BMI 25.0-29.9) 5/11/2015    Restless legs syndrome (RLS)     on Mirapex    Tubular adenoma        Prior to Admission medications    Medication Sig Start Date End Date Taking?  Authorizing Provider   predniSONE 10 MG (21) TBPK Take 10 mg by mouth daily Take 60mg po on day 1, 50mg po on day 2, 40mg po on day 3, 30mg po on day 4, 20 mg po on day 5, 10mg po on day 6 7/23/20  Yes Benita Fontenot DPM   Magnesium 300 MG CAPS Take 30 mg by mouth 2 times daily   Yes Historical Provider, MD   meloxicam (MOBIC) 15 MG tablet Take 1 tablet by mouth daily 10/29/19  Yes Marta Lima MD   pramipexole (MIRAPEX) 0.5 MG tablet TAKE ONE TABLET BY MOUTH ONCE NIGHTLY 10/8/19  Yes Magda Blue MD   citalopram (CELEXA) 20 MG tablet TAKE ONE TABLET BY MOUTH ONCE DAILY Varicosities absent, Bilateral. Erythema present, Left, increased temperature left midfoot. Neurological:  Sensation present to light touch to level of digits, Bilateral.    Musculoskeletal: Muscle strength 5/5, Left. Pain present upon palpation of lateral tarsal metatarsal joint, no pain over 1,2 met cun joint, pain 3 met cun joint, and 4-5 met cuboid joint, Left. normal medial longitudinal arch, Bilateral.  Ankle ROM normal,Bilateral.      Radiographs: 3 views left Foot:  Severe degenerative changes at the tarsal metatarsal joint. No acute pathology. Asessment: Patient is a 65 y.o. female with:   1. Foot pain, left    2. Acute idiopathic gout of left foot    3. Primary osteoarthritis of left foot    4. Edema of left foot        Plan: Patient examined and evaluated. Current condition and treatment options discussed in detail. Verbal and written instructions given to patient. Contact office with any questions/problems/concerns. Check labs  Prednisone taper  Offered a Darco shoe or short CAM walker-she declined for now.    Rest, ice  Sturdy shoe    Orders Placed This Encounter   Procedures    XR FOOT LEFT (MIN 3 VIEWS)    Sedimentation Rate     Standing Status:   Future     Number of Occurrences:   1     Standing Expiration Date:   7/23/2021    Uric Acid     Standing Status:   Future     Number of Occurrences:   1     Standing Expiration Date:   7/23/2021    CBC     Standing Status:   Future     Number of Occurrences:   1     Standing Expiration Date:   7/23/2021     Orders Placed This Encounter   Medications    predniSONE 10 MG (21) TBPK     Sig: Take 10 mg by mouth daily Take 60mg po on day 1, 50mg po on day 2, 40mg po on day 3, 30mg po on day 4, 20 mg po on day 5, 10mg po on day 6     Dispense:  21 each     Refill:  0        RTC in 2week(s).    7/23/2020

## 2020-07-24 ASSESSMENT — ENCOUNTER SYMPTOMS
NAUSEA: 0
COLOR CHANGE: 0
DIARRHEA: 0
VOMITING: 0
CONSTIPATION: 0

## 2020-09-11 ENCOUNTER — HOSPITAL ENCOUNTER (OUTPATIENT)
Dept: WOMENS IMAGING | Age: 70
Discharge: HOME | End: 2020-09-13
Payer: MEDICARE

## 2020-09-11 PROCEDURE — 77063 BREAST TOMOSYNTHESIS BI: CPT

## 2020-09-14 RX ORDER — MELOXICAM 15 MG/1
15 TABLET ORAL DAILY
Qty: 90 TABLET | Refills: 3 | Status: SHIPPED | OUTPATIENT
Start: 2020-09-14 | End: 2021-01-26 | Stop reason: ALTCHOICE

## 2020-12-02 ENCOUNTER — OFFICE VISIT (OUTPATIENT)
Dept: ORTHOPEDIC SURGERY | Age: 70
End: 2020-12-02
Payer: MEDICARE

## 2020-12-02 PROCEDURE — G8400 PT W/DXA NO RESULTS DOC: HCPCS | Performed by: ORTHOPAEDIC SURGERY

## 2020-12-02 PROCEDURE — G8484 FLU IMMUNIZE NO ADMIN: HCPCS | Performed by: ORTHOPAEDIC SURGERY

## 2020-12-02 PROCEDURE — G8417 CALC BMI ABV UP PARAM F/U: HCPCS | Performed by: ORTHOPAEDIC SURGERY

## 2020-12-02 PROCEDURE — 4040F PNEUMOC VAC/ADMIN/RCVD: CPT | Performed by: ORTHOPAEDIC SURGERY

## 2020-12-02 PROCEDURE — 3017F COLORECTAL CA SCREEN DOC REV: CPT | Performed by: ORTHOPAEDIC SURGERY

## 2020-12-02 PROCEDURE — 99213 OFFICE O/P EST LOW 20 MIN: CPT | Performed by: ORTHOPAEDIC SURGERY

## 2020-12-02 PROCEDURE — 1090F PRES/ABSN URINE INCON ASSESS: CPT | Performed by: ORTHOPAEDIC SURGERY

## 2020-12-02 PROCEDURE — 1036F TOBACCO NON-USER: CPT | Performed by: ORTHOPAEDIC SURGERY

## 2020-12-02 PROCEDURE — G8427 DOCREV CUR MEDS BY ELIG CLIN: HCPCS | Performed by: ORTHOPAEDIC SURGERY

## 2020-12-02 PROCEDURE — 1123F ACP DISCUSS/DSCN MKR DOCD: CPT | Performed by: ORTHOPAEDIC SURGERY

## 2020-12-02 NOTE — PROGRESS NOTES
Jean Santos M.D.            [address] [facility] [address] [facility],[address], [address] [facility]           Dept Phone: [phone]           Dept Fax:  [phone] [address] [facility]           [place]          Dept Phone: [phone]           Dept Fax:  [phone]      Chief Compliant:  Chief Complaint   Patient presents with    Knee Pain     rt knee        History of Present Illness: This is a 65 y.o. female who presents to the clinic today for evaluation / follow up of status post left total knee. Patient also having some right knee and leg pain. Patient states that her left total knee feels great. She states her right knee is getting little bit harder to move but she has a larger complaint of being able lift her right leg up in the air including tying shoes etc.  She denies any recent injury or trauma. She denies any radicular symptoms. .       Review of Systems   Constitutional: Negative for fever, chills, sweats. Eyes: Negative for changes in vision, or pain. HENT: Negative for ear ache, epistaxis, or sore throat. Respiratory/Cardio: Negative for Chest pain, palpitations, SOB, or cough. Gastrointestinal: Negative for abdominal pain, N/V/D. Genitourinary: Negative for dysuria, frequency, urgency, or hematuria. Neurological: Negative for headache, numbness, or weakness. Integumentary: Negative for rash, itching, laceration, or abrasion. Musculoskeletal: Positive for Knee Pain (rt knee)       Physical Exam:  Constitutional: Patient is oriented to person, place, and time. Patient appears well-developed and well nourished. HENT: Negative otherwise noted  Head: Normocephalic and Atraumatic  Nose: Normal  Eyes: Conjunctivae and EOM are normal  Neck: Normal range of motion Neck supple. Respiratory/Cardio: Effort normal. No respiratory distress.   Musculoskeletal: Examination the patient's left total knee notes her motion is superb 0-130 degrees. She has good stability and tracking    Examination of the patient's right knee notes that she has good motion 0-130 degrees. Varus and valgus stability is good she has significant patellofemoral crepitus    As noted the patient has pain on increased flexion of the right hip with pain on both internal ex rotation with a positive FADIR as well as Stinchfield's. Neurological: Patient is alert and oriented to person, place, and time. Normal strenght. No sensory deficit. Skin: Skin is warm and dry  Psychiatric: Behavior is normal. Thought content normal.  Nursing note and vitals reviewed. Labs and Imaging:     XR taken today:  Xr Pelvis (1-2 Views)    Result Date: 12/2/2020  X-rays taken today reviewed by me show standing AP of the pelvis. Patient has fairly moderate to early significant degenerative joint disease of the right hip with joint space narrowing as well as cam and pincer osteophytes. She has no evidence for avascular process. Patient has on her left hip some mild to early moderate arthrosis and joint space narrowing certainly not nearly as severe as the right    Xr Knee Right (1-2 Views)    Result Date: 12/2/2020  X-rays taken today reviewed by me show standing AP of both knees and a lateral the right knee. Patient is status post left total knee arthroplasty component appears to be in good position on this view. Patient is noted on the right side to have relatively normal-appearing joint spaces the tibiofemoral joint however the lateral views reveal severe patellofemoral arthrosis.           Orders Placed This Encounter   Procedures    XR PELVIS (1-2 VIEWS)     Standing Status:   Future     Number of Occurrences:   1     Standing Expiration Date:   12/2/2021    IR ARTHR/ASP/INJ MAJOR JT/BURSA RIGHT WO US     Standing Status:   Future     Standing Expiration Date:   12/2/2021     Scheduling Instructions: file   Occupational History    Not on file   Social Needs    Financial resource strain: Not on file    Food insecurity     Worry: Not on file     Inability: Not on file    Transportation needs     Medical: Not on file     Non-medical: Not on file   Tobacco Use    Smoking status: Former Smoker     Packs/day: 0.50     Years: 3.00     Pack years: 1.50     Last attempt to quit:      Years since quittin.9    Smokeless tobacco: Never Used   Substance and Sexual Activity    Alcohol use: Yes     Comment: 1-2 per week    Drug use: No    Sexual activity: Not on file   Lifestyle    Physical activity     Days per week: Not on file     Minutes per session: Not on file    Stress: Not on file   Relationships    Social connections     Talks on phone: Not on file     Gets together: Not on file     Attends [employer] service: Not on file     Active member of club or organization: Not on file     Attends meetings of clubs or organizations: Not on file     Relationship status: Not on file    Intimate partner violence     Fear of current or ex partner: Not on file     Emotionally abused: Not on file     Physically abused: Not on file     Forced sexual activity: Not on file   Other Topics Concern    Not on file   Social History Narrative    Not on file     Past Medical History:   Diagnosis Date    Arthritis     Bone spur of left foot     Breast cancer ([facility] [address]     left with reconstruction, no chemo or radiation    Colon polyp     Depression     Diverticulosis     Elevated liver enzymes     GERD (gastroesophageal reflux disease)     Hemorrhoids     Hyperlipidemia     Liver hemangioma     Migraine headache     Osteopenia     Overweight (BMI 25.0-29.9) 2015    Restless legs syndrome (RLS)     on Mirapex    Tubular adenoma      Past Surgical History:   Procedure Laterality Date    BREAST BIOPSY Left     cancer found    BREAST RECONSTRUCTION Left     using abdominal tissue    CATARACT REMOVAL WITH IMPLANT Bilateral     COLONOSCOPY      COLONOSCOPY  2 10 15    DIVERTICULOSIS AND HEMORRHOIDS , TUBULAR ADENOMA     COLONOSCOPY N/A 2/21/2018    [facility]     remainder of breast was removed because the margins were not clear on the partial mastectomy    MASTECTOMY, PARTIAL Left     for cancer    [place] REMOVAL OF HEEL SPUR Left 11/5/2018    EXOSTECTOMY DORSAL FOOT performed by Kermit Allen DPM at [address]      L3,4,5, fusion    TOTAL KNEE ARTHROPLASTY Left 5/21/2019    KNEE TOTAL ARTHROPLASTY performed by Enoch Orr MD at [address] [facility]       Family History   Problem Relation Age of Onset    Ovarian Cancer Mother     Heart Attack Father     Breast Cancer Maternal Grandmother    Plan  Patient will be sent to interventional radiology to have injection to her right hip as I feel the crux of her right leg pain is associated with her hip. She was advised that she does have significant patellofemoral disease on the right knee and she is probably looking at a knee replacement at some point in future but I am hoping the hip will injection will help her out. We will see the patient back here in 3 to 4 months. Call if any problems prior to that time. Provider Attestation:  Michelle Mcconnell, personally performed the services described in this documentation. All medical record entries made by the scribe were at my direction and in my presence. I have reviewed the chart and discharge instructions and agree that the records reflect my personal performance and is accurate and complete. Enoch Orr MD. 12/02/20      Please note that this chart was generated using voice recognition Dragon dictation software. Although every effort was made to ensure the accuracy of this automated transcription, some errors in transcription may have occurred.

## 2020-12-04 RX ORDER — METHYLPREDNISOLONE ACETATE 80 MG/ML
80 INJECTION, SUSPENSION INTRA-ARTICULAR; INTRALESIONAL; INTRAMUSCULAR; SOFT TISSUE ONCE
Status: CANCELLED | OUTPATIENT
Start: 2020-12-04 | End: 2020-12-04

## 2020-12-04 RX ORDER — LIDOCAINE HYDROCHLORIDE 10 MG/ML
4 INJECTION, SOLUTION EPIDURAL; INFILTRATION; INTRACAUDAL; PERINEURAL ONCE
Status: CANCELLED | OUTPATIENT
Start: 2020-12-04 | End: 2020-12-04

## 2020-12-04 RX ORDER — BUPIVACAINE HYDROCHLORIDE 5 MG/ML
4 INJECTION, SOLUTION EPIDURAL; INTRACAUDAL ONCE
Status: CANCELLED | OUTPATIENT
Start: 2020-12-04 | End: 2020-12-04

## 2020-12-09 ENCOUNTER — HOSPITAL ENCOUNTER (OUTPATIENT)
Dept: INTERVENTIONAL RADIOLOGY/VASCULAR | Age: 70
Discharge: HOME | End: 2020-12-11
Payer: MEDICARE

## 2020-12-09 PROCEDURE — 77002 NEEDLE LOCALIZATION BY XRAY: CPT | Performed by: RADIOLOGY

## 2020-12-09 PROCEDURE — 2709999900 IR ARTHR/ASP/INJ MAJOR JT/BURSA RIGHT WO US

## 2020-12-09 PROCEDURE — 6360000004 HC RX CONTRAST MEDICATION: Performed by: ORTHOPAEDIC SURGERY

## 2020-12-09 PROCEDURE — 2500000003 HC RX 250 WO HCPCS: Performed by: ORTHOPAEDIC SURGERY

## 2020-12-09 PROCEDURE — 6360000002 HC RX W HCPCS: Performed by: ORTHOPAEDIC SURGERY

## 2020-12-09 PROCEDURE — 20610 DRAIN/INJ JOINT/BURSA W/O US: CPT | Performed by: RADIOLOGY

## 2020-12-09 RX ORDER — METHYLPREDNISOLONE ACETATE 80 MG/ML
80 INJECTION, SUSPENSION INTRA-ARTICULAR; INTRALESIONAL; INTRAMUSCULAR; SOFT TISSUE ONCE
Status: COMPLETED | OUTPATIENT
Start: 2020-12-09 | End: 2020-12-09

## 2020-12-09 RX ORDER — BUPIVACAINE HYDROCHLORIDE 5 MG/ML
4 INJECTION, SOLUTION EPIDURAL; INTRACAUDAL ONCE
Status: COMPLETED | OUTPATIENT
Start: 2020-12-09 | End: 2020-12-09

## 2020-12-09 RX ORDER — LIDOCAINE HYDROCHLORIDE 10 MG/ML
4 INJECTION, SOLUTION EPIDURAL; INFILTRATION; INTRACAUDAL; PERINEURAL ONCE
Status: COMPLETED | OUTPATIENT
Start: 2020-12-09 | End: 2020-12-09

## 2020-12-09 RX ADMIN — LIDOCAINE HYDROCHLORIDE 4 ML: 10 INJECTION, SOLUTION EPIDURAL; INFILTRATION; INTRACAUDAL; PERINEURAL at 13:13

## 2020-12-09 RX ADMIN — METHYLPREDNISOLONE ACETATE 80 MG: 80 INJECTION, SUSPENSION INTRA-ARTICULAR; INTRALESIONAL; INTRAMUSCULAR; SOFT TISSUE at 13:14

## 2020-12-09 RX ADMIN — BUPIVACAINE HYDROCHLORIDE 4 ML: 5 INJECTION, SOLUTION EPIDURAL; INTRACAUDAL at 13:12

## 2020-12-09 RX ADMIN — IOHEXOL 10 ML: 240 INJECTION, SOLUTION INTRATHECAL; INTRAVASCULAR; INTRAVENOUS; ORAL at 13:39

## 2020-12-09 ASSESSMENT — PAIN SCALES - GENERAL: PAINLEVEL_OUTOF10: 5

## 2020-12-09 NOTE — BRIEF OP NOTE
Brief Postoperative Note    Zaina Licea  YOB: 1950  [id number]    Pre-operative Diagnosis: Rt hip pain    Post-operative Diagnosis: Same    Procedure: Rt hip inj for pain    Medications Given: none    Anesthesia: Local    Surgeons/Assistants: Dagmar Guillermo MD    Estimated Blood Loss: minimal    Complications: none    Specimens: were not obtained    Findings: Rt hip joint accessed using 22 g needle and fluoro; 8 mls joint fluid removed and then 9 mls combo short/long acting anesthetics and steroid injected as per Dr Grimm. Pt tolerated well.       Electronically signed by Dagmar Guillermo on 12/9/2020 at 1:35 PM

## 2020-12-09 NOTE — PROGRESS NOTES
Therapeutic right hip injection complete. 8 ml of clear fuid drained from jiont space. Ordered meds injected as ordered. Right hip site cleansed off and band aid applied. Pt tolerated procedure well and discharged ambulatory.

## 2020-12-11 ENCOUNTER — TELEPHONE (OUTPATIENT)
Dept: ORTHOPEDIC SURGERY | Age: 70
End: 2020-12-11

## 2020-12-14 VITALS — HEIGHT: 64 IN | WEIGHT: 150 LBS | BODY MASS INDEX: 25.61 KG/M2

## 2020-12-14 RX ORDER — FOLIC ACID 0.8 MG
1 TABLET ORAL DAILY
COMMUNITY

## 2020-12-14 RX ORDER — CHOLECALCIFEROL (VITAMIN D3) 125 MCG
10 CAPSULE ORAL NIGHTLY PRN
COMMUNITY

## 2020-12-14 NOTE — TELEPHONE ENCOUNTER
Discussed with patient pain post injection to right hip on 12/9/2020. She states she had \"about a day-and-a-half\" of relief after the injection, but as of Friday morning, she had increased pain. Patient states the pain runs from about 3 inches superior to the anterior aspect of the knee to the lateral hip. The describes the pain as \"a tightness. \" When she walks, she sometimes feels an \"electric shooting pain\" along the same described path. Discussed using an ice compress to the injection site for the next 3 days, 20 minutes on, 20 minutes off. After this, patient may alternate ice and moist heat and may introduce warm bath (with or without Epsom salt), if she is comfortable getting in and out of the bath tub. Discussed taking Tylenol around the clock, instead of PRN. Discussed increasing potassium in her diet for the next week through avocados, guacamole, and / or bananas. Once the icing regimen is completed, if there is still pain above the knee, patient may use over-the-counter Salonpas patches (with menthol), but do not mix with cold or warm compresses, as this may result in a chemical burn. When ice compress is removed, patient should massage the area with flat fingers in a circular motion, which will help with circulation, spasm, and desensitization. Patient took notes and was able to repeat back the plan of care. She is agreeable with this and will call if there is no improvement after one week. If there is improvement, she will continue the interventions, slowing weaning off of them. Also discussed signs and symptoms which would require a visit to the ER.

## 2020-12-14 NOTE — TELEPHONE ENCOUNTER
Patient called again today requesting a phone call from clinical staff regarding her increased pain. She states that the pain is generating from the front of her leg (approx 3 inches above the knee). She'd like for someone to call her to help determine if she has injured herself or done any possible injury to the surgery site. Please advise.

## 2020-12-18 ENCOUNTER — OFFICE VISIT (OUTPATIENT)
Dept: ORTHOPEDIC SURGERY | Age: 70
End: 2020-12-18
Payer: MEDICARE

## 2020-12-18 PROCEDURE — 1090F PRES/ABSN URINE INCON ASSESS: CPT | Performed by: ORTHOPAEDIC SURGERY

## 2020-12-18 PROCEDURE — G8400 PT W/DXA NO RESULTS DOC: HCPCS | Performed by: ORTHOPAEDIC SURGERY

## 2020-12-18 PROCEDURE — 99213 OFFICE O/P EST LOW 20 MIN: CPT | Performed by: ORTHOPAEDIC SURGERY

## 2020-12-18 PROCEDURE — G8484 FLU IMMUNIZE NO ADMIN: HCPCS | Performed by: ORTHOPAEDIC SURGERY

## 2020-12-18 PROCEDURE — 3017F COLORECTAL CA SCREEN DOC REV: CPT | Performed by: ORTHOPAEDIC SURGERY

## 2020-12-18 PROCEDURE — G8417 CALC BMI ABV UP PARAM F/U: HCPCS | Performed by: ORTHOPAEDIC SURGERY

## 2020-12-18 PROCEDURE — 4040F PNEUMOC VAC/ADMIN/RCVD: CPT | Performed by: ORTHOPAEDIC SURGERY

## 2020-12-18 PROCEDURE — 1123F ACP DISCUSS/DSCN MKR DOCD: CPT | Performed by: ORTHOPAEDIC SURGERY

## 2020-12-18 PROCEDURE — G8428 CUR MEDS NOT DOCUMENT: HCPCS | Performed by: ORTHOPAEDIC SURGERY

## 2020-12-18 PROCEDURE — 1036F TOBACCO NON-USER: CPT | Performed by: ORTHOPAEDIC SURGERY

## 2020-12-18 RX ORDER — METHYLPREDNISOLONE 4 MG/1
4 TABLET ORAL SEE ADMIN INSTRUCTIONS
Qty: 1 KIT | Refills: 0 | Status: SHIPPED | OUTPATIENT
Start: 2020-12-18 | End: 2020-12-24

## 2021-01-04 DIAGNOSIS — M25.551 RIGHT HIP PAIN: Primary | ICD-10-CM

## 2021-01-04 NOTE — TELEPHONE ENCOUNTER
Patient called to state that the last hip injection that she received isn't giving her any relief from her pain. She's requesting that Dr Chris Herron order her some pain medication to help her with her pain until her upcoming appointment in office on 3/3/21.       Please send order to:  [facility] [address]

## 2021-01-05 RX ORDER — TRAMADOL HYDROCHLORIDE 50 MG/1
50 TABLET ORAL EVERY 6 HOURS PRN
Qty: 28 TABLET | Refills: 0 | Status: SHIPPED | OUTPATIENT
Start: 2021-01-05 | End: 2021-01-12

## 2021-01-11 DIAGNOSIS — M25.551 RIGHT HIP PAIN: Primary | ICD-10-CM

## 2021-01-11 NOTE — TELEPHONE ENCOUNTER
Patient calling in stating she is out of her Ultram, but she would like something stronger if possible. She is stating the Ultram only gives her relief for about 1-1 1/2 hours. She had a right hip Intra-articular injection on 12/9/20, she is having a lot of pain, you gave her a Medrol Dosepak 12/18/20 which did not help much. Pharmacy:  [facility] [address].

## 2021-01-12 RX ORDER — HYDROCODONE BITARTRATE AND ACETAMINOPHEN 5; 325 MG/1; MG/1
1 TABLET ORAL EVERY 6 HOURS PRN
Qty: 28 TABLET | Refills: 0 | Status: SHIPPED | OUTPATIENT
Start: 2021-01-12 | End: 2021-01-19

## 2021-01-14 ENCOUNTER — TELEPHONE (OUTPATIENT)
Dept: ORTHOPEDIC SURGERY | Age: 71
End: 2021-01-14

## 2021-01-14 NOTE — TELEPHONE ENCOUNTER
Patient calling in to see if we order a back brace or a knee brace. I was unable to locate anything in the chart to that affect. She stated she received a call from someone about coming in for fitting on the braces, but she want not sure whom they were. I then schedule her for follow up for Wednesday with Dr. Bryan Toscano.

## 2021-01-14 NOTE — TELEPHONE ENCOUNTER
Patient called to say that the Hydrocodone prescribed on 1/12/21 is not helping her with the pain. She said she is only getting about 1 hour of slight relief. Feels as if the pain is directly in the joint. Cannot bend over, cannot lift right leg, and is very cautious while putting weight on her legs. Right hip Intra Articular injection 12/9/20    Medrol Dose pack on 12/18/20 - no relief    Ultram prescribed on 1/5/21 also did not help with her pain.

## 2021-01-20 ENCOUNTER — OFFICE VISIT (OUTPATIENT)
Dept: ORTHOPEDIC SURGERY | Age: 71
End: 2021-01-20
Payer: MEDICARE

## 2021-01-20 DIAGNOSIS — M25.551 HIP PAIN, RIGHT: Primary | ICD-10-CM

## 2021-01-20 PROCEDURE — 1123F ACP DISCUSS/DSCN MKR DOCD: CPT | Performed by: ORTHOPAEDIC SURGERY

## 2021-01-20 PROCEDURE — 99213 OFFICE O/P EST LOW 20 MIN: CPT | Performed by: ORTHOPAEDIC SURGERY

## 2021-01-20 PROCEDURE — 4040F PNEUMOC VAC/ADMIN/RCVD: CPT | Performed by: ORTHOPAEDIC SURGERY

## 2021-01-20 PROCEDURE — G8484 FLU IMMUNIZE NO ADMIN: HCPCS | Performed by: ORTHOPAEDIC SURGERY

## 2021-01-20 PROCEDURE — 1036F TOBACCO NON-USER: CPT | Performed by: ORTHOPAEDIC SURGERY

## 2021-01-20 PROCEDURE — G8400 PT W/DXA NO RESULTS DOC: HCPCS | Performed by: ORTHOPAEDIC SURGERY

## 2021-01-20 PROCEDURE — 1090F PRES/ABSN URINE INCON ASSESS: CPT | Performed by: ORTHOPAEDIC SURGERY

## 2021-01-20 PROCEDURE — 3017F COLORECTAL CA SCREEN DOC REV: CPT | Performed by: ORTHOPAEDIC SURGERY

## 2021-01-20 PROCEDURE — G8417 CALC BMI ABV UP PARAM F/U: HCPCS | Performed by: ORTHOPAEDIC SURGERY

## 2021-01-20 PROCEDURE — G8428 CUR MEDS NOT DOCUMENT: HCPCS | Performed by: ORTHOPAEDIC SURGERY

## 2021-01-20 RX ORDER — HYDROCODONE BITARTRATE AND ACETAMINOPHEN 5; 325 MG/1; MG/1
1 TABLET ORAL EVERY 6 HOURS PRN
Qty: 28 TABLET | Refills: 0 | Status: SHIPPED | OUTPATIENT
Start: 2021-01-20 | End: 2021-01-29 | Stop reason: SDUPTHER

## 2021-01-20 RX ORDER — HYDROCODONE BITARTRATE AND ACETAMINOPHEN 5; 325 MG/1; MG/1
1 TABLET ORAL EVERY 6 HOURS PRN
Qty: 28 TABLET | Refills: 0 | Status: CANCELLED | OUTPATIENT
Start: 2021-01-20 | End: 2021-01-27

## 2021-01-20 NOTE — PROGRESS NOTES
Rayray Toth M.D.            [address] [facility],[address], [address] [facility]           Dept Phone: [phone]           Dept Fax:  [phone] [address] [facility]           [place]          Dept Phone: [phone]           Dept Fax:  [phone]      Chief Compliant:  Chief Complaint   Patient presents with    Pain     Rt hip        History of Present Illness: This is a 65 y.o. female who presents to the clinic today for evaluation / follow up of severe right hip pain. Please see all prior dictations. Willem Talavera had an injection in early December of her right hip for marked DJD of the right hip. She only had very short-term relief with this. She states she is at the point where she can barely ambulate she can barely getting out of bed she is having severe groin pain and is basically here seeking surgical invention in order to alleviate her pain. .       Review of Systems   Constitutional: Negative for fever, chills, sweats. Eyes: Negative for changes in vision, or pain. HENT: Negative for ear ache, epistaxis, or sore throat. Respiratory/Cardio: Negative for Chest pain, palpitations, SOB, or cough. Gastrointestinal: Negative for abdominal pain, N/V/D. Genitourinary: Negative for dysuria, frequency, urgency, or hematuria. Neurological: Negative for headache, numbness, or weakness. Integumentary: Negative for rash, itching, laceration, or abrasion. Musculoskeletal: Positive for Pain (Rt hip)       Physical Exam:  Constitutional: Patient is oriented to person, place, and time. Patient appears well-developed and well nourished. HENT: Negative otherwise noted  Head: Normocephalic and Atraumatic  Nose: Normal  Eyes: Conjunctivae and EOM are normal  Neck: Normal range of motion Neck supple. Respiratory/Cardio: Effort normal. No respiratory distress. Musculoskeletal: Examination of her right hip notes she has significant pain with any flexion internal ex rotation with a positive Stinchfield's. FADIR is significantly painful as well. She has no significant leg leg discrepancy  Neurological: Patient is alert and oriented to person, place, and time. Normal strenght. No sensory deficit. Skin: Skin is warm and dry  Psychiatric: Behavior is normal. Thought content normal.  Nursing note and vitals reviewed. Labs and Imaging:     XR not repeated today most recent x-rays taken 12/2/2020     No orders of the defined types were placed in this encounter. Assessment and Plan:  1. Hip pain, right    2. DJD right hip        This is a 65 y.o. female who presents to the clinic today for evaluation / follow up of DJD right hip severely symptomatic. Past History:    Current Outpatient Medications:     atorvastatin (LIPITOR) 20 MG tablet, Take 1 tablet by mouth daily, Disp: 90 tablet, Rfl: 3    citalopram (CELEXA) 20 MG tablet, TAKE ONE TABLET BY MOUTH ONCE DAILY, Disp: 90 tablet, Rfl: 3    pramipexole (MIRAPEX) 0.5 MG tablet, TAKE ONE TABLET BY MOUTH ONCE NIGHTLY, Disp: 90 tablet, Rfl: 3    Magnesium 500 MG CAPS, Take 1 capsule by mouth daily, Disp: , Rfl:     melatonin 5 MG TABS tablet, Take 10 mg by mouth nightly as needed, Disp: , Rfl:     meloxicam (MOBIC) 15 MG tablet, Take 1 tablet by mouth daily, Disp: 90 tablet, Rfl: 3    SUMAtriptan (IMITREX) 50 MG tablet, Take at onset of headache; may repeat in 2 hours; no more than 2 in 24 hours. , Disp: 9 tablet, Rfl: 11    CALCIUM CITRATE PO, Take 1,200 mg by mouth daily calciu, Disp: , Rfl:     Multiple Vitamins-Minerals (THERAPEUTIC MULTIVITAMIN-MINERALS) tablet, Take 1 tablet by mouth daily. , Disp: , Rfl:     omeprazole (PRILOSEC) 20 MG capsule, Take 20 mg by mouth daily.   , Disp: , Rfl:   Allergies   Allergen Reactions    Darvon [Propoxyphene] Nausea And Vomiting and Other (See Comments) Severe headaches    Morphine Nausea And Vomiting and Other (See Comments)     Severe headaches     Social History     Socioeconomic History    Marital status:      Spouse name: Not on file    Number of children: Not on file    Years of education: Not on file    Highest education level: Not on file   Occupational History    Not on file   Social Needs    Financial resource strain: Not on file    Food insecurity     Worry: Not on file     Inability: Not on file    Transportation needs     Medical: Not on file     Non-medical: Not on file   Tobacco Use    Smoking status: Former Smoker     Packs/day: 0.50     Years: 3.00     Pack years: 1.50     Quit date:      Years since quittin.0    Smokeless tobacco: Never Used   Substance and Sexual Activity    Alcohol use: Yes     Comment: 1-2 per week    Drug use: No    Sexual activity: Not on file   Lifestyle    Physical activity     Days per week: Not on file     Minutes per session: Not on file    Stress: Not on file   Relationships    Social connections     Talks on phone: Not on file     Gets together: Not on file     Attends Confucianism service: Not on file     Active member of club or organization: Not on file     Attends meetings of clubs or organizations: Not on file     Relationship status: Not on file    Intimate partner violence     Fear of current or ex partner: Not on file     Emotionally abused: Not on file     Physically abused: Not on file     Forced sexual activity: Not on file   Other Topics Concern    Not on file   Social History Narrative    Not on file     Past Medical History:   Diagnosis Date    Arthritis     Bone spur of left foot     Breast cancer ([facility] [address]     left with reconstruction, no chemo or radiation    Colon polyp     Depression     Diverticulosis     Elevated liver enzymes     GERD (gastroesophageal reflux disease)     Hemorrhoids     Hyperlipidemia     Liver hemangioma     Migraine headache  Osteopenia     Overweight (BMI 25.0-29.9) 5/11/2015    Restless legs syndrome (RLS)     on Mirapex    Tubular adenoma      Past Surgical History:   Procedure Laterality Date    BREAST BIOPSY Left     cancer found    BREAST RECONSTRUCTION Left     using abdominal tissue    CATARACT REMOVAL WITH IMPLANT Bilateral     COLONOSCOPY      COLONOSCOPY  2 10 15    DIVERTICULOSIS AND HEMORRHOIDS , TUBULAR ADENOMA     COLONOSCOPY N/A 2/21/2018    [facility]     remainder of breast was removed because the margins were not clear on the partial mastectomy    MASTECTOMY, PARTIAL Left     for cancer    [place] REMOVAL OF HEEL SPUR Left 11/5/2018    EXOSTECTOMY DORSAL FOOT performed by Jose Youssef DPM at [address]      L3,4,5, fusion    TOTAL KNEE ARTHROPLASTY Left 5/21/2019    KNEE TOTAL ARTHROPLASTY performed by Marisol Valles MD at [address] [facility]       Family History   Problem Relation Age of Onset    Ovarian Cancer Mother     Heart Attack Father     Breast Cancer Maternal Grandmother      Patient was advised that she does have significant arthrosis of her right hip and having failed conservative treatment including injections and I think that she would benefit from a total hip arthroplasty. She is made aware of risk and benefits. Preoperative clearance per Dr. Myles Chavez. Provider Attestation:  Mateus Nicholson, personally performed the services described in this documentation. All medical record entries made by the scribe were at my direction and in my presence. I have reviewed the chart and discharge instructions and agree that the records reflect my personal performance and is accurate and complete.  Marisol Valles MD. 01/20/21 Please note that this chart was generated using voice recognition Dragon dictation software. Although every effort was made to ensure the accuracy of this automated transcription, some errors in transcription may have occurred.

## 2021-01-26 ENCOUNTER — HOSPITAL ENCOUNTER (OUTPATIENT)
Dept: PREADMISSION TESTING | Age: 71
Discharge: HOME | End: 2021-01-30
Payer: MEDICARE

## 2021-01-26 VITALS
BODY MASS INDEX: 25.95 KG/M2 | DIASTOLIC BLOOD PRESSURE: 80 MMHG | HEART RATE: 97 BPM | HEIGHT: 64 IN | RESPIRATION RATE: 20 BRPM | OXYGEN SATURATION: 98 % | WEIGHT: 152 LBS | SYSTOLIC BLOOD PRESSURE: 147 MMHG | TEMPERATURE: 97.8 F

## 2021-01-26 DIAGNOSIS — M16.11 OSTEOARTHRITIS OF RIGHT HIP, UNSPECIFIED OSTEOARTHRITIS TYPE: ICD-10-CM

## 2021-01-26 LAB
ABO/RH: NORMAL
ABSOLUTE EOS #: 0 K/UL (ref 0–0.4)
ABSOLUTE IMMATURE GRANULOCYTE: ABNORMAL K/UL (ref 0–0.3)
ABSOLUTE LYMPH #: 2.9 K/UL (ref 1–4.8)
ABSOLUTE MONO #: 0.8 K/UL (ref 0.1–1.3)
ANION GAP SERPL CALCULATED.3IONS-SCNC: 13 MMOL/L (ref 9–17)
ANTIBODY SCREEN: NEGATIVE
ARM BAND NUMBER: NORMAL
BASOPHILS # BLD: 0 % (ref 0–2)
BASOPHILS ABSOLUTE: 0 K/UL (ref 0–0.2)
BILIRUBIN URINE: NEGATIVE
BLOOD BANK COMMENT: NORMAL
BUN BLDV-MCNC: 11 MG/DL (ref 8–23)
BUN/CREAT BLD: ABNORMAL (ref 9–20)
CALCIUM SERPL-MCNC: 9.3 MG/DL (ref 8.6–10.4)
CHLORIDE BLD-SCNC: 102 MMOL/L (ref 98–107)
CO2: 23 MMOL/L (ref 20–31)
COLOR: YELLOW
COMMENT UA: NORMAL
CREAT SERPL-MCNC: 0.74 MG/DL (ref 0.5–0.9)
DIFFERENTIAL TYPE: ABNORMAL
DIRECT EXAM: NORMAL
EOSINOPHILS RELATIVE PERCENT: 0 % (ref 0–4)
EXPIRATION DATE: NORMAL
GFR AFRICAN AMERICAN: >60 ML/MIN
GFR NON-AFRICAN AMERICAN: >60 ML/MIN
GFR SERPL CREATININE-BSD FRML MDRD: ABNORMAL ML/MIN/{1.73_M2}
GFR SERPL CREATININE-BSD FRML MDRD: ABNORMAL ML/MIN/{1.73_M2}
GLUCOSE BLD-MCNC: 106 MG/DL (ref 70–99)
GLUCOSE URINE: NEGATIVE
HCT VFR BLD CALC: 41.7 % (ref 36–46)
HEMOGLOBIN: 13.6 G/DL (ref 12–16)
IMMATURE GRANULOCYTES: ABNORMAL %
KETONES, URINE: NEGATIVE
LEUKOCYTE ESTERASE, URINE: NEGATIVE
LYMPHOCYTES # BLD: 34 % (ref 24–44)
Lab: NORMAL
MCH RBC QN AUTO: 29.7 PG (ref 26–34)
MCHC RBC AUTO-ENTMCNC: 32.7 G/DL (ref 31–37)
MCV RBC AUTO: 90.8 FL (ref 80–100)
MONOCYTES # BLD: 9 % (ref 1–7)
NITRITE, URINE: NEGATIVE
NRBC AUTOMATED: ABNORMAL PER 100 WBC
PDW BLD-RTO: 14.4 % (ref 11.5–14.9)
PH UA: 5 (ref 5–8)
PLATELET # BLD: 255 K/UL (ref 150–450)
PLATELET ESTIMATE: ABNORMAL
PMV BLD AUTO: 8.2 FL (ref 6–12)
POTASSIUM SERPL-SCNC: 4.2 MMOL/L (ref 3.7–5.3)
PROTEIN UA: NEGATIVE
RBC # BLD: 4.59 M/UL (ref 4–5.2)
RBC # BLD: ABNORMAL 10*6/UL
SEG NEUTROPHILS: 57 % (ref 36–66)
SEGMENTED NEUTROPHILS ABSOLUTE COUNT: 4.7 K/UL (ref 1.3–9.1)
SODIUM BLD-SCNC: 138 MMOL/L (ref 135–144)
SPECIFIC GRAVITY UA: 1.02 (ref 1–1.03)
SPECIMEN DESCRIPTION: NORMAL
TURBIDITY: CLEAR
URINE HGB: NEGATIVE
UROBILINOGEN, URINE: NORMAL
WBC # BLD: 8.4 K/UL (ref 3.5–11)
WBC # BLD: ABNORMAL 10*3/UL

## 2021-01-26 PROCEDURE — 86901 BLOOD TYPING SEROLOGIC RH(D): CPT

## 2021-01-26 PROCEDURE — 80048 BASIC METABOLIC PNL TOTAL CA: CPT

## 2021-01-26 PROCEDURE — 85025 COMPLETE CBC W/AUTO DIFF WBC: CPT

## 2021-01-26 PROCEDURE — 86900 BLOOD TYPING SEROLOGIC ABO: CPT

## 2021-01-26 PROCEDURE — 36415 COLL VENOUS BLD VENIPUNCTURE: CPT

## 2021-01-26 PROCEDURE — 81003 URINALYSIS AUTO W/O SCOPE: CPT

## 2021-01-26 PROCEDURE — 87641 MR-STAPH DNA AMP PROBE: CPT

## 2021-01-26 PROCEDURE — 93005 ELECTROCARDIOGRAM TRACING: CPT | Performed by: ANESTHESIOLOGY

## 2021-01-26 PROCEDURE — 86850 RBC ANTIBODY SCREEN: CPT

## 2021-01-26 RX ORDER — IBUPROFEN 200 MG
200 TABLET ORAL EVERY 6 HOURS PRN
Status: ON HOLD | COMMUNITY
End: 2021-02-09 | Stop reason: HOSPADM

## 2021-01-26 ASSESSMENT — ENCOUNTER SYMPTOMS
SORE THROAT: 0
COUGH: 0
GASTROINTESTINAL NEGATIVE: 1
SHORTNESS OF BREATH: 0
RHINORRHEA: 0
WHEEZING: 0

## 2021-01-26 NOTE — H&P (VIEW-ONLY)
HISTORY and [facility]       NAME:  Conrado Cazares  MRN: [id number]   YOB: 1950   Date: 1/26/2021   Age: 65 y.o. Gender: female     COMPLAINT AND PRESENT HISTORY:   Conrado Cazares is 65 y.o.,  female, presents for pre-anesthesia/admission testing for HIP TOTAL ARTHROPLASTY ASI (RIGHT) per Dr. Lawerance Cabot. Primary dx: DJD RIGHT HIP.    HPI:  Hip Pain   Incident onset: Right hip pain started in October 2020, denies injury. There was no injury mechanism. The pain is present in the right hip and right thigh (Right groin). The quality of the pain is described as aching and burning. The pain is at a severity of 5/10 (5-7). The pain is moderate. The pain has been constant since onset. Associated symptoms include an inability to bear weight and a loss of motion. Pertinent negatives include no loss of sensation, muscle weakness, numbness or tingling. She reports no foreign bodies present. The symptoms are aggravated by movement and weight bearing. She has tried elevation and heat (Norco, IBU, Meloxicam, Tylenol, x1 injection) for the symptoms. The treatment provided no relief. Testing completed r/t condition:  X-RAY PELVIS, 12-2-20:  Narrative   X-rays taken today reviewed by me show standing AP of the pelvis.  Patient    has fairly moderate to early significant degenerative joint disease of the    right hip with joint space narrowing as well as cam and pincer    osteophytes.  She has no evidence for avascular process.  Patient has on    her left hip some mild to early moderate arthrosis and joint space    narrowing certainly not nearly as severe as the right     Review of additional significant medical hx:  GERD: Denies issues w/dysphagia. Current medications r/t condition: Omeprazole    BREAST CA: DX'D 1998- tx w/quad mastectomy, needle aspiration, mastectomy (trans/flap)- no chemo or radiation. SNORING: States she tried to have a sleep study, could not tolerate- does not use a CPAP. HLD:  Current medications r/t condition: Lipitor    Depression: Mood is stable currently, worse in the winter. Current medications r/t condition: Celexa    RLS:  Current medications r/t condition: Mirapex    Denies hx of MRSA infection. Denies hx of any complications w/anesthesia.    PAST MEDICAL HISTORY     Past Medical History:   Diagnosis Date    Arthritis     Bone spur of left foot     Breast cancer ([address] 1998    left with reconstruction, no chemo or radiation    Colon polyp     Degenerative joint disease of right hip     Depression     Diverticulosis     Elevated liver enzymes     GERD (gastroesophageal reflux disease)     Hemorrhoids     Hyperlipidemia     Liver hemangioma     Migraine headache     Osteopenia     Overweight (BMI 25.0-29.9) 05/11/2015    Restless legs syndrome (RLS)     on Mirapex    Tubular adenoma        SURGICAL HISTORY       Past Surgical History:   Procedure Laterality Date    BREAST BIOPSY Left     cancer found    BREAST RECONSTRUCTION Left     using abdominal tissue    CATARACT REMOVAL WITH IMPLANT Bilateral     COLONOSCOPY      COLONOSCOPY  2 10 15    DIVERTICULOSIS AND HEMORRHOIDS , TUBULAR ADENOMA     COLONOSCOPY N/A 2/21/2018    COLONOSCOPY WITH BIOPSY performed by Abe Leger MD at [address] [facility], DIAGNOSTIC      EYE SURGERY      MASTECTOMY      MASTECTOMY Left     remainder of breast was removed because the margins were not clear on the partial mastectomy    MASTECTOMY, PARTIAL Left     for cancer    [place] REMOVAL OF HEEL SPUR Left 11/5/2018    EXOSTECTOMY DORSAL FOOT performed by Lane Nair DPM at [address]      L3,4,5, fusion    TOTAL KNEE ARTHROPLASTY Left 5/21/2019    KNEE TOTAL ARTHROPLASTY performed by Yolis Rosario MD at [address] [facility]       Social History Socioeconomic History    Marital status:      Spouse name: None    Number of children: None    Years of education: None    Highest education level: None   Occupational History    None   Social Needs    Financial resource strain: None    Food insecurity     Worry: None     Inability: None    Transportation needs     Medical: None     Non-medical: None   Tobacco Use    Smoking status: Former Smoker     Packs/day: 0.50     Years: 3.00     Pack years: 1.50     Quit date:      Years since quittin.0    Smokeless tobacco: Never Used   Substance and Sexual Activity    Alcohol use: Yes     Comment: 1-2 per week    Drug use: No    Sexual activity: None   Lifestyle    Physical activity     Days per week: None     Minutes per session: None    Stress: None   Relationships    Social connections     Talks on phone: None     Gets together: None     Attends Judaism service: None     Active member of club or organization: None     Attends meetings of clubs or organizations: None     Relationship status: None    Intimate partner violence     Fear of current or ex partner: None     Emotionally abused: None     Physically abused: None     Forced sexual activity: None   Other Topics Concern    None   Social History Narrative    None       REVIEW OF SYSTEMS      Allergies   Allergen Reactions    Darvon [Propoxyphene] Nausea And Vomiting and Other (See Comments)     Severe headaches    Morphine Nausea And Vomiting and Other (See Comments)     Severe headaches       Current Outpatient Medications on File Prior to Encounter   Medication Sig Dispense Refill    ibuprofen (ADVIL;MOTRIN) 200 MG tablet Take 200 mg by mouth every 6 hours as needed for Pain      HYDROcodone-acetaminophen (NORCO) 5-325 MG per tablet Take 1 tablet by mouth every 6 hours as needed for Pain for up to 7 days. Intended supply: 7 days.  Take lowest dose possible to manage pain 28 tablet 0  atorvastatin (LIPITOR) 20 MG tablet Take 1 tablet by mouth daily 90 tablet 3    citalopram (CELEXA) 20 MG tablet TAKE ONE TABLET BY MOUTH ONCE DAILY 90 tablet 3    pramipexole (MIRAPEX) 0.5 MG tablet TAKE ONE TABLET BY MOUTH ONCE NIGHTLY 90 tablet 3    Magnesium 500 MG CAPS Take 1 capsule by mouth daily      melatonin 5 MG TABS tablet Take 10 mg by mouth nightly as needed      SUMAtriptan (IMITREX) 50 MG tablet Take at onset of headache; may repeat in 2 hours; no more than 2 in 24 hours. 9 tablet 11    CALCIUM CITRATE PO Take 1,200 mg by mouth daily calciu      Multiple Vitamins-Minerals (THERAPEUTIC MULTIVITAMIN-MINERALS) tablet Take 1 tablet by mouth daily.  omeprazole (PRILOSEC) 20 MG capsule Take 20 mg by mouth daily. No current facility-administered medications on file prior to encounter. Review of Systems   Constitutional: Negative for chills and fever. HENT: Negative for congestion, ear pain, postnasal drip, rhinorrhea and sore throat. Respiratory: Negative for cough, shortness of breath and wheezing. Cardiovascular: Negative for chest pain, palpitations and leg swelling. Gastrointestinal: Negative. Genitourinary: Negative. Musculoskeletal: Positive for arthralgias. Neurological: Negative for dizziness, tingling, numbness and headaches (Hx of migraines- takes Imitrex PRN- had one this morning, resolved now). Hematological: Does not bruise/bleed easily. GENERAL PHYSICAL EXAM     Vitals: BP (!) 147/80   Pulse 97   Temp 97.8 °F (36.6 °C) (Temporal)   Resp 20   Ht 5' 3.5\" (1.613 m)   Wt 152 lb (68.9 kg)   SpO2 98%   BMI 26.50 kg/m²               Physical Exam   Constitutional: She is oriented to person, place, and time. She appears well-developed and well-nourished. Non-toxic appearance. She does not have a sickly appearance. She does not appear ill. No distress. HENT:   Head: Normocephalic. Right Ear: Tympanic membrane, external ear and ear canal normal.   Left Ear: Tympanic membrane, external ear and ear canal normal.   Nose: Nose normal.   Mouth/Throat: Oropharynx is clear and moist and mucous membranes are normal. No oropharyngeal exudate, posterior oropharyngeal edema, posterior oropharyngeal erythema or tonsillar abscesses. Eyes: Pupils are equal, round, and reactive to light. Conjunctivae are normal. Right eye exhibits no discharge. Left eye exhibits no discharge. Neck: Neck supple. Cardiovascular: Normal rate, regular rhythm, normal heart sounds and intact distal pulses. Pulses:       Radial pulses are 2+ on the right side and 2+ on the left side. Dorsalis pedis pulses are 2+ on the right side and 2+ on the left side. Posterior tibial pulses are 2+ on the right side and 2+ on the left side. Pulmonary/Chest: Effort normal and breath sounds normal. No respiratory distress. She has no wheezes. She has no rales. Abdominal: Soft. Bowel sounds are normal. She exhibits no distension and no mass. There is no abdominal tenderness. There is no rebound and no guarding. Musculoskeletal:      Right hip: She exhibits decreased range of motion, tenderness and bony tenderness. She exhibits no swelling, no deformity and no laceration. Right lower leg: She exhibits swelling (Trace, no erythema, no warmth, negative Navdeep's sign). She exhibits no tenderness. Left lower leg: She exhibits swelling (Trace, no erythema, no warmth, negative Navdeep's sign). She exhibits no tenderness. Lymphadenopathy:     She has no cervical adenopathy. Neurological: She is alert and oriented to person, place, and time. Skin: Skin is warm and dry. She is not diaphoretic. Psychiatric: She has a normal mood and affect. Her behavior is normal.   Vitals reviewed.       SURGERY / PROVISIONAL DIAGNOSES:      HIP TOTAL ARTHROPLASTY ASI (RIGHT)     DJD RIGHT HIP    Patient Active Problem List Diagnosis Date Noted    Degenerative joint disease of right hip     Primary osteoarthritis of left knee 05/21/2019    Bone spur of toe of left foot     Primary osteoarthritis of left foot     Pain, foot, left, chronic     Diarrhea 08/10/2017    Restless leg syndrome, uncontrolled 07/27/2016    Snoring 05/10/2016    Exercise counseling 05/10/2016    Diverticulosis of intestine without bleeding 03/14/2016    Gastroesophageal reflux disease without esophagitis 03/14/2016    Hemorrhoids     Tubular adenoma     Migraine without aura and without status migrainosus, not intractable 11/10/2015    Liver hemangioma     Elevated liver enzymes     Hyperlipidemia with target LDL less than 130 05/11/2015    Migraine headache 05/11/2015    BMI 25.0-25.9,adult 05/11/2015    Diverticulosis     Colon polyp            ANNIE Ayala - CNP on 1/26/2021 at 11:55 AM

## 2021-01-26 NOTE — PROGRESS NOTES
Dr. Reyna Escamilla, anesthesia, was contacted and informed of patient's history and planned surgery. Orders received and no clearance required.

## 2021-01-26 NOTE — H&P
HISTORY and [facility]       NAME:  Keira Zamora  MRN: [id number]   YOB: 1950   Date: 1/26/2021   Age: 65 y.o. Gender: female     COMPLAINT AND PRESENT HISTORY:   Keira Zamora is 65 y.o.,  female, presents for pre-anesthesia/admission testing for HIP TOTAL ARTHROPLASTY ASI (RIGHT) per Dr. Paola Chung. Primary dx: DJD RIGHT HIP.    HPI:  Hip Pain   Incident onset: Right hip pain started in October 2020, denies injury. There was no injury mechanism. The pain is present in the right hip and right thigh (Right groin). The quality of the pain is described as aching and burning. The pain is at a severity of 5/10 (5-7). The pain is moderate. The pain has been constant since onset. Associated symptoms include an inability to bear weight and a loss of motion. Pertinent negatives include no loss of sensation, muscle weakness, numbness or tingling. She reports no foreign bodies present. The symptoms are aggravated by movement and weight bearing. She has tried elevation and heat (Norco, IBU, Meloxicam, Tylenol, x1 injection) for the symptoms. The treatment provided no relief. Testing completed r/t condition:  X-RAY PELVIS, 12-2-20:  Narrative   X-rays taken today reviewed by me show standing AP of the pelvis.  Patient    has fairly moderate to early significant degenerative joint disease of the    right hip with joint space narrowing as well as cam and pincer    osteophytes.  She has no evidence for avascular process.  Patient has on    her left hip some mild to early moderate arthrosis and joint space    narrowing certainly not nearly as severe as the right     Review of additional significant medical hx:  GERD: Denies issues w/dysphagia. Current medications r/t condition: Omeprazole    BREAST CA: DX'D 1998- tx w/quad mastectomy, needle aspiration, mastectomy (trans/flap)- no chemo or radiation. SNORING: States she tried to have a sleep study, could not tolerate- does not use a CPAP. HLD:  Current medications r/t condition: Lipitor    Depression: Mood is stable currently, worse in the winter. Current medications r/t condition: Celexa    RLS:  Current medications r/t condition: Mirapex    Denies hx of MRSA infection. Denies hx of any complications w/anesthesia.    PAST MEDICAL HISTORY     Past Medical History:   Diagnosis Date    Arthritis     Bone spur of left foot     Breast cancer ([address] 1998    left with reconstruction, no chemo or radiation    Colon polyp     Degenerative joint disease of right hip     Depression     Diverticulosis     Elevated liver enzymes     GERD (gastroesophageal reflux disease)     Hemorrhoids     Hyperlipidemia     Liver hemangioma     Migraine headache     Osteopenia     Overweight (BMI 25.0-29.9) 05/11/2015    Restless legs syndrome (RLS)     on Mirapex    Tubular adenoma        SURGICAL HISTORY       Past Surgical History:   Procedure Laterality Date    BREAST BIOPSY Left     cancer found    BREAST RECONSTRUCTION Left     using abdominal tissue    CATARACT REMOVAL WITH IMPLANT Bilateral     COLONOSCOPY      COLONOSCOPY  2 10 15    DIVERTICULOSIS AND HEMORRHOIDS , TUBULAR ADENOMA     COLONOSCOPY N/A 2/21/2018    COLONOSCOPY WITH BIOPSY performed by Sebastian Smith MD at [address] [facility], DIAGNOSTIC      EYE SURGERY      MASTECTOMY      MASTECTOMY Left     remainder of breast was removed because the margins were not clear on the partial mastectomy    MASTECTOMY, PARTIAL Left     for cancer    [place] REMOVAL OF HEEL SPUR Left 11/5/2018    EXOSTECTOMY DORSAL FOOT performed by Marti August DPM at [address]      L3,4,5, fusion    TOTAL KNEE ARTHROPLASTY Left 5/21/2019    KNEE TOTAL ARTHROPLASTY performed by Dedrick Melchor MD at [address] [facility]       Social History Socioeconomic History    Marital status:      Spouse name: None    Number of children: None    Years of education: None    Highest education level: None   Occupational History    None   Social Needs    Financial resource strain: None    Food insecurity     Worry: None     Inability: None    Transportation needs     Medical: None     Non-medical: None   Tobacco Use    Smoking status: Former Smoker     Packs/day: 0.50     Years: 3.00     Pack years: 1.50     Quit date:      Years since quittin.0    Smokeless tobacco: Never Used   Substance and Sexual Activity    Alcohol use: Yes     Comment: 1-2 per week    Drug use: No    Sexual activity: None   Lifestyle    Physical activity     Days per week: None     Minutes per session: None    Stress: None   Relationships    Social connections     Talks on phone: None     Gets together: None     Attends Shinto service: None     Active member of club or organization: None     Attends meetings of clubs or organizations: None     Relationship status: None    Intimate partner violence     Fear of current or ex partner: None     Emotionally abused: None     Physically abused: None     Forced sexual activity: None   Other Topics Concern    None   Social History Narrative    None       REVIEW OF SYSTEMS      Allergies   Allergen Reactions    Darvon [Propoxyphene] Nausea And Vomiting and Other (See Comments)     Severe headaches    Morphine Nausea And Vomiting and Other (See Comments)     Severe headaches       Current Outpatient Medications on File Prior to Encounter   Medication Sig Dispense Refill    ibuprofen (ADVIL;MOTRIN) 200 MG tablet Take 200 mg by mouth every 6 hours as needed for Pain      HYDROcodone-acetaminophen (NORCO) 5-325 MG per tablet Take 1 tablet by mouth every 6 hours as needed for Pain for up to 7 days. Intended supply: 7 days.  Take lowest dose possible to manage pain 28 tablet 0 Right Ear: Tympanic membrane, external ear and ear canal normal.   Left Ear: Tympanic membrane, external ear and ear canal normal.   Nose: Nose normal.   Mouth/Throat: Oropharynx is clear and moist and mucous membranes are normal. No oropharyngeal exudate, posterior oropharyngeal edema, posterior oropharyngeal erythema or tonsillar abscesses. Eyes: Pupils are equal, round, and reactive to light. Conjunctivae are normal. Right eye exhibits no discharge. Left eye exhibits no discharge. Neck: Neck supple. Cardiovascular: Normal rate, regular rhythm, normal heart sounds and intact distal pulses. Pulses:       Radial pulses are 2+ on the right side and 2+ on the left side. Dorsalis pedis pulses are 2+ on the right side and 2+ on the left side. Posterior tibial pulses are 2+ on the right side and 2+ on the left side. Pulmonary/Chest: Effort normal and breath sounds normal. No respiratory distress. She has no wheezes. She has no rales. Abdominal: Soft. Bowel sounds are normal. She exhibits no distension and no mass. There is no abdominal tenderness. There is no rebound and no guarding. Musculoskeletal:      Right hip: She exhibits decreased range of motion, tenderness and bony tenderness. She exhibits no swelling, no deformity and no laceration. Right lower leg: She exhibits swelling (Trace, no erythema, no warmth, negative Navdeep's sign). She exhibits no tenderness. Left lower leg: She exhibits swelling (Trace, no erythema, no warmth, negative Navdeep's sign). She exhibits no tenderness. Lymphadenopathy:     She has no cervical adenopathy. Neurological: She is alert and oriented to person, place, and time. Skin: Skin is warm and dry. She is not diaphoretic. Psychiatric: She has a normal mood and affect. Her behavior is normal.   Vitals reviewed.       SURGERY / PROVISIONAL DIAGNOSES:      HIP TOTAL ARTHROPLASTY ASI (RIGHT)     DJD RIGHT HIP    Patient Active Problem List Diagnosis Date Noted    Degenerative joint disease of right hip     Primary osteoarthritis of left knee 05/21/2019    Bone spur of toe of left foot     Primary osteoarthritis of left foot     Pain, foot, left, chronic     Diarrhea 08/10/2017    Restless leg syndrome, uncontrolled 07/27/2016    Snoring 05/10/2016    Exercise counseling 05/10/2016    Diverticulosis of intestine without bleeding 03/14/2016    Gastroesophageal reflux disease without esophagitis 03/14/2016    Hemorrhoids     Tubular adenoma     Migraine without aura and without status migrainosus, not intractable 11/10/2015    Liver hemangioma     Elevated liver enzymes     Hyperlipidemia with target LDL less than 130 05/11/2015    Migraine headache 05/11/2015    BMI 25.0-25.9,adult 05/11/2015    Diverticulosis     Colon polyp            Serenity Carvalho, APRN - CNP on 1/26/2021 at 11:55 AM

## 2021-01-27 LAB
EKG ATRIAL RATE: 83 BPM
EKG P AXIS: 50 DEGREES
EKG P-R INTERVAL: 166 MS
EKG Q-T INTERVAL: 382 MS
EKG QRS DURATION: 90 MS
EKG QTC CALCULATION (BAZETT): 448 MS
EKG R AXIS: -14 DEGREES
EKG T AXIS: 27 DEGREES
EKG VENTRICULAR RATE: 83 BPM

## 2021-01-27 PROCEDURE — 93010 ELECTROCARDIOGRAM REPORT: CPT | Performed by: INTERNAL MEDICINE

## 2021-01-28 DIAGNOSIS — M25.551 HIP PAIN, RIGHT: ICD-10-CM

## 2021-01-29 RX ORDER — HYDROCODONE BITARTRATE AND ACETAMINOPHEN 5; 325 MG/1; MG/1
1 TABLET ORAL EVERY 6 HOURS PRN
Qty: 28 TABLET | Refills: 0 | Status: SHIPPED | OUTPATIENT
Start: 2021-01-29 | End: 2021-02-05

## 2021-01-31 ASSESSMENT — PROMIS GLOBAL HEALTH SCALE
IN THE PAST 7 DAYS, HOW WOULD YOU RATE YOUR FATIGUE ON AVERAGE [ON A SCALE FROM 1 (NONE) TO 5 (VERY SEVERE)]?: 4
IN GENERAL, WOULD YOU SAY YOUR QUALITY OF LIFE IS...[ON A SCALE OF 1 (POOR) TO 5 (EXCELLENT)]: 4
SUM OF RESPONSES TO QUESTIONS 3, 6, 7, & 8: 17
SUM OF RESPONSES TO QUESTIONS 2, 4, 5, & 10: 13
HOW IS THE PROMIS V1.1 BEING ADMINISTERED?: 2
IN GENERAL, HOW WOULD YOU RATE YOUR MENTAL HEALTH, INCLUDING YOUR MOOD AND YOUR ABILITY TO THINK [ON A SCALE OF 1 (POOR) TO 5 (EXCELLENT)]?: 3
IN GENERAL, PLEASE RATE HOW WELL YOU CARRY OUT YOUR USUAL SOCIAL ACTIVITIES (INCLUDES ACTIVITIES AT HOME, AT WORK, AND IN YOUR COMMUNITY, AND RESPONSIBILITIES AS A PARENT, CHILD, SPOUSE, EMPLOYEE, FRIEND, ETC) [ON A SCALE OF 1 (POOR) TO 5 (EXCELLENT)]?: 3
IN GENERAL, HOW WOULD YOU RATE YOUR SATISFACTION WITH YOUR SOCIAL ACTIVITIES AND RELATIONSHIPS [ON A SCALE OF 1 (POOR) TO 5 (EXCELLENT)]?: 3

## 2021-01-31 ASSESSMENT — HOOS JR
LYING IN BED (TURNING OVER, MAINTAINING HIP POSITION): 3
GOING UP OR DOWN STAIRS: 3
WALKING ON UNEVEN SURFACE: 2

## 2021-02-05 ENCOUNTER — HOSPITAL ENCOUNTER (OUTPATIENT)
Dept: LAB | Age: 71
Setting detail: SPECIMEN
Discharge: HOME | End: 2021-02-05
Payer: MEDICARE

## 2021-02-05 DIAGNOSIS — Z01.818 PREOP TESTING: Primary | ICD-10-CM

## 2021-02-05 PROCEDURE — U0005 INFEC AGEN DETEC AMPLI PROBE: HCPCS

## 2021-02-05 PROCEDURE — U0003 INFECTIOUS AGENT DETECTION BY NUCLEIC ACID (DNA OR RNA); SEVERE ACUTE RESPIRATORY SYNDROME CORONAVIRUS 2 (SARS-COV-2) (CORONAVIRUS DISEASE [COVID-19]), AMPLIFIED PROBE TECHNIQUE, MAKING USE OF HIGH THROUGHPUT TECHNOLOGIES AS DESCRIBED BY CMS-2020-01-R: HCPCS

## 2021-02-06 LAB
SARS-COV-2, RAPID: NORMAL
SARS-COV-2: NORMAL
SARS-COV-2: NOT DETECTED
SOURCE: NORMAL

## 2021-02-08 ENCOUNTER — ANESTHESIA EVENT (OUTPATIENT)
Dept: OPERATING ROOM | Age: 71
End: 2021-02-08
Payer: MEDICARE

## 2021-02-09 ENCOUNTER — HOSPITAL ENCOUNTER (OUTPATIENT)
Age: 71
Discharge: HOME | End: 2021-02-09
Attending: ORTHOPAEDIC SURGERY | Admitting: ORTHOPAEDIC SURGERY
Payer: MEDICARE

## 2021-02-09 ENCOUNTER — APPOINTMENT (OUTPATIENT)
Dept: GENERAL RADIOLOGY | Age: 71
End: 2021-02-09
Attending: ORTHOPAEDIC SURGERY
Payer: MEDICARE

## 2021-02-09 ENCOUNTER — ANESTHESIA (OUTPATIENT)
Dept: OPERATING ROOM | Age: 71
End: 2021-02-09
Payer: MEDICARE

## 2021-02-09 VITALS
HEIGHT: 63 IN | DIASTOLIC BLOOD PRESSURE: 66 MMHG | WEIGHT: 152 LBS | HEART RATE: 101 BPM | BODY MASS INDEX: 26.93 KG/M2 | TEMPERATURE: 98.4 F | SYSTOLIC BLOOD PRESSURE: 132 MMHG | OXYGEN SATURATION: 98 % | RESPIRATION RATE: 14 BRPM

## 2021-02-09 VITALS — SYSTOLIC BLOOD PRESSURE: 139 MMHG | TEMPERATURE: 98.8 F | DIASTOLIC BLOOD PRESSURE: 65 MMHG | OXYGEN SATURATION: 96 %

## 2021-02-09 DIAGNOSIS — M16.11 PRIMARY OSTEOARTHRITIS OF RIGHT HIP: Primary | ICD-10-CM

## 2021-02-09 PROCEDURE — 2580000003 HC RX 258: Performed by: ORTHOPAEDIC SURGERY

## 2021-02-09 PROCEDURE — 2709999900 HC NON-CHARGEABLE SUPPLY: Performed by: ORTHOPAEDIC SURGERY

## 2021-02-09 PROCEDURE — 3700000001 HC ADD 15 MINUTES (ANESTHESIA): Performed by: ORTHOPAEDIC SURGERY

## 2021-02-09 PROCEDURE — 3600000003 HC SURGERY LEVEL 3 BASE: Performed by: ORTHOPAEDIC SURGERY

## 2021-02-09 PROCEDURE — 3600000013 HC SURGERY LEVEL 3 ADDTL 15MIN: Performed by: ORTHOPAEDIC SURGERY

## 2021-02-09 PROCEDURE — 3700000000 HC ANESTHESIA ATTENDED CARE: Performed by: ORTHOPAEDIC SURGERY

## 2021-02-09 PROCEDURE — C1776 JOINT DEVICE (IMPLANTABLE): HCPCS | Performed by: ORTHOPAEDIC SURGERY

## 2021-02-09 PROCEDURE — 2580000003 HC RX 258: Performed by: ANESTHESIOLOGY

## 2021-02-09 PROCEDURE — 6360000002 HC RX W HCPCS: Performed by: ORTHOPAEDIC SURGERY

## 2021-02-09 PROCEDURE — 6360000002 HC RX W HCPCS: Performed by: NURSE ANESTHETIST, CERTIFIED REGISTERED

## 2021-02-09 PROCEDURE — 2500000003 HC RX 250 WO HCPCS: Performed by: NURSE ANESTHETIST, CERTIFIED REGISTERED

## 2021-02-09 PROCEDURE — 7100000001 HC PACU RECOVERY - ADDTL 15 MIN: Performed by: ORTHOPAEDIC SURGERY

## 2021-02-09 PROCEDURE — 97116 GAIT TRAINING THERAPY: CPT

## 2021-02-09 PROCEDURE — 2720000010 HC SURG SUPPLY STERILE: Performed by: ORTHOPAEDIC SURGERY

## 2021-02-09 PROCEDURE — 3209999900 FLUORO FOR SURGICAL PROCEDURES

## 2021-02-09 PROCEDURE — 6360000002 HC RX W HCPCS: Performed by: ANESTHESIOLOGY

## 2021-02-09 PROCEDURE — 27130 TOTAL HIP ARTHROPLASTY: CPT | Performed by: ORTHOPAEDIC SURGERY

## 2021-02-09 PROCEDURE — 97162 PT EVAL MOD COMPLEX 30 MIN: CPT

## 2021-02-09 PROCEDURE — 7100000000 HC PACU RECOVERY - FIRST 15 MIN: Performed by: ORTHOPAEDIC SURGERY

## 2021-02-09 PROCEDURE — 97535 SELF CARE MNGMENT TRAINING: CPT

## 2021-02-09 PROCEDURE — 97161 PT EVAL LOW COMPLEX 20 MIN: CPT

## 2021-02-09 PROCEDURE — 97165 OT EVAL LOW COMPLEX 30 MIN: CPT

## 2021-02-09 PROCEDURE — 2500000003 HC RX 250 WO HCPCS: Performed by: ORTHOPAEDIC SURGERY

## 2021-02-09 PROCEDURE — 6370000000 HC RX 637 (ALT 250 FOR IP): Performed by: ORTHOPAEDIC SURGERY

## 2021-02-09 DEVICE — STEM FEM SZ 8 L136MM 133DEG HIP PPS STD OFFSET TYP 1 TAPR: Type: IMPLANTABLE DEVICE | Site: HIP | Status: FUNCTIONAL

## 2021-02-09 DEVICE — IMPLANTABLE DEVICE: Type: IMPLANTABLE DEVICE | Site: HIP | Status: FUNCTIONAL

## 2021-02-09 DEVICE — Z DUP USE 2503711 SCREW BONE L25MM DIA6.5MM ACET HIP TI ST TRIL: Type: IMPLANTABLE DEVICE | Site: HIP | Status: FUNCTIONAL

## 2021-02-09 DEVICE — LINER ACET SZ F DIA40MM NEUT HIP XLPE ARCOMXL G7: Type: IMPLANTABLE DEVICE | Site: HIP | Status: FUNCTIONAL

## 2021-02-09 DEVICE — G7 FINNED 4 HOLE SHELL 56F: Type: IMPLANTABLE DEVICE | Site: HIP | Status: FUNCTIONAL

## 2021-02-09 RX ORDER — FENTANYL CITRATE 50 UG/ML
INJECTION, SOLUTION INTRAMUSCULAR; INTRAVENOUS PRN
Status: DISCONTINUED | OUTPATIENT
Start: 2021-02-09 | End: 2021-02-09 | Stop reason: SDUPTHER

## 2021-02-09 RX ORDER — METOCLOPRAMIDE HYDROCHLORIDE 5 MG/ML
10 INJECTION INTRAMUSCULAR; INTRAVENOUS
Status: DISCONTINUED | OUTPATIENT
Start: 2021-02-09 | End: 2021-02-09 | Stop reason: HOSPADM

## 2021-02-09 RX ORDER — PROMETHAZINE HYDROCHLORIDE 25 MG/ML
6.25 INJECTION, SOLUTION INTRAMUSCULAR; INTRAVENOUS
Status: DISCONTINUED | OUTPATIENT
Start: 2021-02-09 | End: 2021-02-09 | Stop reason: CLARIF

## 2021-02-09 RX ORDER — SENNA AND DOCUSATE SODIUM 50; 8.6 MG/1; MG/1
1 TABLET, FILM COATED ORAL 2 TIMES DAILY
Status: DISCONTINUED | OUTPATIENT
Start: 2021-02-09 | End: 2021-02-09 | Stop reason: HOSPADM

## 2021-02-09 RX ORDER — SODIUM CHLORIDE 0.9 % (FLUSH) 0.9 %
10 SYRINGE (ML) INJECTION PRN
Status: DISCONTINUED | OUTPATIENT
Start: 2021-02-09 | End: 2021-02-09 | Stop reason: HOSPADM

## 2021-02-09 RX ORDER — ASPIRIN 81 MG/1
81 TABLET ORAL 2 TIMES DAILY
Qty: 60 TABLET | Refills: 0 | Status: SHIPPED | OUTPATIENT
Start: 2021-02-09 | End: 2021-04-27

## 2021-02-09 RX ORDER — SODIUM CHLORIDE 0.9 % (FLUSH) 0.9 %
10 SYRINGE (ML) INJECTION EVERY 12 HOURS SCHEDULED
Status: DISCONTINUED | OUTPATIENT
Start: 2021-02-09 | End: 2021-02-09 | Stop reason: HOSPADM

## 2021-02-09 RX ORDER — ONDANSETRON 2 MG/ML
INJECTION INTRAMUSCULAR; INTRAVENOUS PRN
Status: DISCONTINUED | OUTPATIENT
Start: 2021-02-09 | End: 2021-02-09 | Stop reason: SDUPTHER

## 2021-02-09 RX ORDER — GLYCOPYRROLATE 1 MG/5 ML
SYRINGE (ML) INTRAVENOUS PRN
Status: DISCONTINUED | OUTPATIENT
Start: 2021-02-09 | End: 2021-02-09 | Stop reason: SDUPTHER

## 2021-02-09 RX ORDER — PROMETHAZINE HYDROCHLORIDE 25 MG/1
12.5 TABLET ORAL EVERY 6 HOURS PRN
Status: DISCONTINUED | OUTPATIENT
Start: 2021-02-09 | End: 2021-02-09 | Stop reason: HOSPADM

## 2021-02-09 RX ORDER — SODIUM CHLORIDE, SODIUM LACTATE, POTASSIUM CHLORIDE, CALCIUM CHLORIDE 600; 310; 30; 20 MG/100ML; MG/100ML; MG/100ML; MG/100ML
INJECTION, SOLUTION INTRAVENOUS CONTINUOUS
Status: DISCONTINUED | OUTPATIENT
Start: 2021-02-09 | End: 2021-02-09 | Stop reason: HOSPADM

## 2021-02-09 RX ORDER — GABAPENTIN 600 MG/1
600 TABLET ORAL ONCE
Status: COMPLETED | OUTPATIENT
Start: 2021-02-09 | End: 2021-02-09

## 2021-02-09 RX ORDER — MEPERIDINE HYDROCHLORIDE 25 MG/ML
12.5 INJECTION INTRAMUSCULAR; INTRAVENOUS; SUBCUTANEOUS EVERY 5 MIN PRN
Status: DISCONTINUED | OUTPATIENT
Start: 2021-02-09 | End: 2021-02-09 | Stop reason: HOSPADM

## 2021-02-09 RX ORDER — SODIUM CHLORIDE, SODIUM LACTATE, POTASSIUM CHLORIDE, CALCIUM CHLORIDE 600; 310; 30; 20 MG/100ML; MG/100ML; MG/100ML; MG/100ML
INJECTION, SOLUTION INTRAVENOUS CONTINUOUS
Status: DISCONTINUED | OUTPATIENT
Start: 2021-02-09 | End: 2021-02-09

## 2021-02-09 RX ORDER — HYDROCODONE BITARTRATE AND ACETAMINOPHEN 5; 325 MG/1; MG/1
1 TABLET ORAL
Status: DISCONTINUED | OUTPATIENT
Start: 2021-02-09 | End: 2021-02-09 | Stop reason: HOSPADM

## 2021-02-09 RX ORDER — HYDRALAZINE HYDROCHLORIDE 20 MG/ML
5 INJECTION INTRAMUSCULAR; INTRAVENOUS EVERY 10 MIN PRN
Status: DISCONTINUED | OUTPATIENT
Start: 2021-02-09 | End: 2021-02-09 | Stop reason: HOSPADM

## 2021-02-09 RX ORDER — ROCURONIUM BROMIDE 10 MG/ML
INJECTION, SOLUTION INTRAVENOUS PRN
Status: DISCONTINUED | OUTPATIENT
Start: 2021-02-09 | End: 2021-02-09 | Stop reason: SDUPTHER

## 2021-02-09 RX ORDER — DEXAMETHASONE SODIUM PHOSPHATE 10 MG/ML
10 INJECTION, SOLUTION INTRAMUSCULAR; INTRAVENOUS ONCE
Status: COMPLETED | OUTPATIENT
Start: 2021-02-09 | End: 2021-02-09

## 2021-02-09 RX ORDER — DIPHENHYDRAMINE HYDROCHLORIDE 50 MG/ML
12.5 INJECTION INTRAMUSCULAR; INTRAVENOUS
Status: DISCONTINUED | OUTPATIENT
Start: 2021-02-09 | End: 2021-02-09 | Stop reason: HOSPADM

## 2021-02-09 RX ORDER — SCOLOPAMINE TRANSDERMAL SYSTEM 1 MG/1
1 PATCH, EXTENDED RELEASE TRANSDERMAL ONCE
Status: DISCONTINUED | OUTPATIENT
Start: 2021-02-09 | End: 2021-02-09

## 2021-02-09 RX ORDER — OXYCODONE HYDROCHLORIDE AND ACETAMINOPHEN 5; 325 MG/1; MG/1
1-2 TABLET ORAL EVERY 4 HOURS PRN
Qty: 42 TABLET | Refills: 0 | Status: SHIPPED | OUTPATIENT
Start: 2021-02-09 | End: 2021-02-18 | Stop reason: SDUPTHER

## 2021-02-09 RX ORDER — ACETAMINOPHEN 500 MG
1000 TABLET ORAL ONCE
Status: COMPLETED | OUTPATIENT
Start: 2021-02-09 | End: 2021-02-09

## 2021-02-09 RX ORDER — ACETAMINOPHEN 325 MG/1
650 TABLET ORAL EVERY 6 HOURS
Status: DISCONTINUED | OUTPATIENT
Start: 2021-02-09 | End: 2021-02-09 | Stop reason: HOSPADM

## 2021-02-09 RX ORDER — LIDOCAINE HYDROCHLORIDE 10 MG/ML
INJECTION, SOLUTION EPIDURAL; INFILTRATION; INTRACAUDAL; PERINEURAL PRN
Status: DISCONTINUED | OUTPATIENT
Start: 2021-02-09 | End: 2021-02-09 | Stop reason: SDUPTHER

## 2021-02-09 RX ORDER — OXYCODONE HYDROCHLORIDE 10 MG/1
10 TABLET ORAL EVERY 4 HOURS PRN
Status: DISCONTINUED | OUTPATIENT
Start: 2021-02-09 | End: 2021-02-09 | Stop reason: HOSPADM

## 2021-02-09 RX ORDER — CALCIUM CHLORIDE 100 MG/ML
INJECTION INTRAVENOUS; INTRAVENTRICULAR PRN
Status: DISCONTINUED | OUTPATIENT
Start: 2021-02-09 | End: 2021-02-09 | Stop reason: ALTCHOICE

## 2021-02-09 RX ORDER — PROPOFOL 10 MG/ML
INJECTION, EMULSION INTRAVENOUS PRN
Status: DISCONTINUED | OUTPATIENT
Start: 2021-02-09 | End: 2021-02-09 | Stop reason: SDUPTHER

## 2021-02-09 RX ORDER — LIDOCAINE HYDROCHLORIDE 10 MG/ML
1 INJECTION, SOLUTION EPIDURAL; INFILTRATION; INTRACAUDAL; PERINEURAL
Status: DISCONTINUED | OUTPATIENT
Start: 2021-02-09 | End: 2021-02-09 | Stop reason: HOSPADM

## 2021-02-09 RX ORDER — NEOSTIGMINE METHYLSULFATE 5 MG/5 ML
SYRINGE (ML) INTRAVENOUS PRN
Status: DISCONTINUED | OUTPATIENT
Start: 2021-02-09 | End: 2021-02-09 | Stop reason: SDUPTHER

## 2021-02-09 RX ORDER — PHENYLEPHRINE HYDROCHLORIDE 10 MG/ML
INJECTION INTRAVENOUS PRN
Status: DISCONTINUED | OUTPATIENT
Start: 2021-02-09 | End: 2021-02-09 | Stop reason: SDUPTHER

## 2021-02-09 RX ORDER — OXYCODONE HYDROCHLORIDE 5 MG/1
5 TABLET ORAL EVERY 4 HOURS PRN
Status: DISCONTINUED | OUTPATIENT
Start: 2021-02-09 | End: 2021-02-09 | Stop reason: HOSPADM

## 2021-02-09 RX ORDER — TRANEXAMIC ACID 100 MG/ML
INJECTION, SOLUTION INTRAVENOUS PRN
Status: DISCONTINUED | OUTPATIENT
Start: 2021-02-09 | End: 2021-02-09 | Stop reason: SDUPTHER

## 2021-02-09 RX ORDER — ASPIRIN 81 MG/1
81 TABLET ORAL 2 TIMES DAILY
Status: DISCONTINUED | OUTPATIENT
Start: 2021-02-09 | End: 2021-02-09 | Stop reason: HOSPADM

## 2021-02-09 RX ORDER — DEXAMETHASONE SODIUM PHOSPHATE 10 MG/ML
INJECTION, SOLUTION INTRAMUSCULAR; INTRAVENOUS PRN
Status: DISCONTINUED | OUTPATIENT
Start: 2021-02-09 | End: 2021-02-09 | Stop reason: SDUPTHER

## 2021-02-09 RX ORDER — ONDANSETRON 2 MG/ML
4 INJECTION INTRAMUSCULAR; INTRAVENOUS EVERY 6 HOURS PRN
Status: DISCONTINUED | OUTPATIENT
Start: 2021-02-09 | End: 2021-02-09 | Stop reason: HOSPADM

## 2021-02-09 RX ADMIN — PROPOFOL 20 MG: 10 INJECTION, EMULSION INTRAVENOUS at 11:26

## 2021-02-09 RX ADMIN — FENTANYL CITRATE 25 MCG: 50 INJECTION, SOLUTION INTRAMUSCULAR; INTRAVENOUS at 10:42

## 2021-02-09 RX ADMIN — SODIUM CHLORIDE, POTASSIUM CHLORIDE, SODIUM LACTATE AND CALCIUM CHLORIDE: 600; 310; 30; 20 INJECTION, SOLUTION INTRAVENOUS at 12:46

## 2021-02-09 RX ADMIN — FENTANYL CITRATE 25 MCG: 50 INJECTION, SOLUTION INTRAMUSCULAR; INTRAVENOUS at 11:22

## 2021-02-09 RX ADMIN — HYDROMORPHONE HYDROCHLORIDE 0.25 MG: 1 INJECTION, SOLUTION INTRAMUSCULAR; INTRAVENOUS; SUBCUTANEOUS at 12:39

## 2021-02-09 RX ADMIN — HYDROMORPHONE HYDROCHLORIDE 0.5 MG: 1 INJECTION, SOLUTION INTRAMUSCULAR; INTRAVENOUS; SUBCUTANEOUS at 12:54

## 2021-02-09 RX ADMIN — TRANEXAMIC ACID 1000 MG: 100 INJECTION, SOLUTION INTRAVENOUS at 11:49

## 2021-02-09 RX ADMIN — FENTANYL CITRATE 25 MCG: 50 INJECTION, SOLUTION INTRAMUSCULAR; INTRAVENOUS at 12:02

## 2021-02-09 RX ADMIN — DEXAMETHASONE SODIUM PHOSPHATE 10 MG: 10 INJECTION, SOLUTION INTRAMUSCULAR; INTRAVENOUS at 08:48

## 2021-02-09 RX ADMIN — ROCURONIUM BROMIDE 50 MG: 10 INJECTION, SOLUTION INTRAVENOUS at 10:12

## 2021-02-09 RX ADMIN — TRANEXAMIC ACID 1000 MG: 100 INJECTION, SOLUTION INTRAVENOUS at 10:27

## 2021-02-09 RX ADMIN — SODIUM CHLORIDE, POTASSIUM CHLORIDE, SODIUM LACTATE AND CALCIUM CHLORIDE: 600; 310; 30; 20 INJECTION, SOLUTION INTRAVENOUS at 10:08

## 2021-02-09 RX ADMIN — SODIUM CHLORIDE, POTASSIUM CHLORIDE, SODIUM LACTATE AND CALCIUM CHLORIDE: 600; 310; 30; 20 INJECTION, SOLUTION INTRAVENOUS at 15:56

## 2021-02-09 RX ADMIN — PHENYLEPHRINE HYDROCHLORIDE 100 MCG: 10 INJECTION INTRAVENOUS at 11:46

## 2021-02-09 RX ADMIN — CEFAZOLIN 2 G: 10 INJECTION, POWDER, FOR SOLUTION INTRAVENOUS at 10:23

## 2021-02-09 RX ADMIN — LIDOCAINE HYDROCHLORIDE 50 MG: 10 INJECTION, SOLUTION EPIDURAL; INFILTRATION; INTRACAUDAL; PERINEURAL at 10:12

## 2021-02-09 RX ADMIN — HYDROMORPHONE HYDROCHLORIDE 0.5 MG: 1 INJECTION, SOLUTION INTRAMUSCULAR; INTRAVENOUS; SUBCUTANEOUS at 12:20

## 2021-02-09 RX ADMIN — HYDROMORPHONE HYDROCHLORIDE 0.25 MG: 1 INJECTION, SOLUTION INTRAMUSCULAR; INTRAVENOUS; SUBCUTANEOUS at 12:32

## 2021-02-09 RX ADMIN — PHENYLEPHRINE HYDROCHLORIDE 100 MCG: 10 INJECTION INTRAVENOUS at 11:53

## 2021-02-09 RX ADMIN — ACETAMINOPHEN 1000 MG: 500 TABLET ORAL at 08:28

## 2021-02-09 RX ADMIN — GABAPENTIN 600 MG: 600 TABLET, FILM COATED ORAL at 08:28

## 2021-02-09 RX ADMIN — Medication 0.6 MG: at 11:55

## 2021-02-09 RX ADMIN — PROPOFOL 160 MG: 10 INJECTION, EMULSION INTRAVENOUS at 10:12

## 2021-02-09 RX ADMIN — CEFAZOLIN SODIUM 2000 MG: 10 INJECTION, POWDER, FOR SOLUTION INTRAVENOUS at 15:54

## 2021-02-09 RX ADMIN — Medication 3 MG: at 11:55

## 2021-02-09 RX ADMIN — FENTANYL CITRATE 100 MCG: 50 INJECTION, SOLUTION INTRAMUSCULAR; INTRAVENOUS at 10:12

## 2021-02-09 RX ADMIN — DEXAMETHASONE SODIUM PHOSPHATE 4 MG: 10 INJECTION, SOLUTION INTRAMUSCULAR; INTRAVENOUS at 10:22

## 2021-02-09 RX ADMIN — FENTANYL CITRATE 25 MCG: 50 INJECTION, SOLUTION INTRAMUSCULAR; INTRAVENOUS at 11:51

## 2021-02-09 RX ADMIN — PHENYLEPHRINE HYDROCHLORIDE 150 MCG: 10 INJECTION INTRAVENOUS at 11:07

## 2021-02-09 RX ADMIN — ONDANSETRON 4 MG: 2 INJECTION INTRAMUSCULAR; INTRAVENOUS at 10:22

## 2021-02-09 ASSESSMENT — PULMONARY FUNCTION TESTS
PIF_VALUE: 19
PIF_VALUE: 18
PIF_VALUE: 19
PIF_VALUE: 15
PIF_VALUE: 17
PIF_VALUE: 17
PIF_VALUE: 15
PIF_VALUE: 0
PIF_VALUE: 5
PIF_VALUE: 16
PIF_VALUE: 20
PIF_VALUE: 21
PIF_VALUE: 18
PIF_VALUE: 21
PIF_VALUE: 7
PIF_VALUE: 18
PIF_VALUE: 19
PIF_VALUE: 21
PIF_VALUE: 16
PIF_VALUE: 9
PIF_VALUE: 15
PIF_VALUE: 20
PIF_VALUE: 18
PIF_VALUE: 35
PIF_VALUE: 22
PIF_VALUE: 16
PIF_VALUE: 20
PIF_VALUE: 19
PIF_VALUE: 20
PIF_VALUE: 26
PIF_VALUE: 19
PIF_VALUE: 18
PIF_VALUE: 19
PIF_VALUE: 18
PIF_VALUE: 20
PIF_VALUE: 22
PIF_VALUE: 5
PIF_VALUE: 10
PIF_VALUE: 15
PIF_VALUE: 19
PIF_VALUE: 23
PIF_VALUE: 20
PIF_VALUE: 15
PIF_VALUE: 16
PIF_VALUE: 7
PIF_VALUE: 21
PIF_VALUE: 15
PIF_VALUE: 18
PIF_VALUE: 18
PIF_VALUE: 17
PIF_VALUE: 9
PIF_VALUE: 20
PIF_VALUE: 18
PIF_VALUE: 18
PIF_VALUE: 16
PIF_VALUE: 19
PIF_VALUE: 6
PIF_VALUE: 19
PIF_VALUE: 22
PIF_VALUE: 9
PIF_VALUE: 19

## 2021-02-09 ASSESSMENT — PAIN SCALES - GENERAL
PAINLEVEL_OUTOF10: 8
PAINLEVEL_OUTOF10: 5
PAINLEVEL_OUTOF10: 5

## 2021-02-09 ASSESSMENT — PAIN - FUNCTIONAL ASSESSMENT: PAIN_FUNCTIONAL_ASSESSMENT: 0-10

## 2021-02-09 ASSESSMENT — PAIN DESCRIPTION - LOCATION
LOCATION: HIP
LOCATION: HIP

## 2021-02-09 ASSESSMENT — PAIN DESCRIPTION - ORIENTATION: ORIENTATION: RIGHT

## 2021-02-09 NOTE — PROGRESS NOTES
CLINICAL PHARMACY NOTE: MEDS TO [address] Select Patient?: Yes  Total # of Prescriptions Filled: 1   The following medications were delivered to the patient:  · Percocet  ·   Total # of Interventions Completed: 0  Time Spent (min): 30    Additional Documentation:

## 2021-02-09 NOTE — INTERVAL H&P NOTE
Update History & Physical     The patient's History and Physical of 1-26-21 was reviewed with the patient. I personally examined the patient, I concur with above findings, there was no change EXCEPT has noted increased pain r/t right sciatic nerve, c/o pain to right lower back. B/l ears not examined today. NPO status: Patient states they have been NPO since before midnight. Medications last taken: OMEPRAZOLE. Anticoagulation status: Patient denies taking any anti-coagulants, including aspirin currently. She did stop Meloxicam since 12-9-20. Personal or family hx of complications w/anesthesia:    Denies CP, palpitations, dizziness, SOB, URI s/s. Review current vital signs per RN flow sheet. (Notation: Medications listed are not currently reconciled at the signing of this H&P note, to be reconciled in pre-op per RN)    Most recent lab work reviewed:  Lab Results   Component Value Date     01/26/2021    K 4.2 01/26/2021     01/26/2021    CO2 23 01/26/2021    BUN 11 01/26/2021    CREATININE 0.74 01/26/2021    GLUCOSE 106 (H) 01/26/2021    CALCIUM 9.3 01/26/2021    PROT 7.1 07/01/2020    LABALBU 4.2 07/01/2020    BILITOT 0.38 07/01/2020    ALKPHOS 86 07/01/2020    AST 18 07/01/2020    ALT 28 07/01/2020    LABGLOM >60 01/26/2021    GFRAA >60 01/26/2021    GLOB NOT REPORTED 10/29/2018       Lab Results   Component Value Date    WBC 8.4 01/26/2021    HGB 13.6 01/26/2021    HCT 41.7 01/26/2021    MCV 90.8 01/26/2021     01/26/2021       Surgical site was confirmed per myself and the patient.   Electronically signed by ANNIE Mike CNP on 2/9/2021 at 8:00 AM

## 2021-02-09 NOTE — CARE COORDINATION
Joint Replacement Discharge Planning Note:    Admission Date:  2/9/2021 Lolita Greenwood is a 66 y.o.  female    Admitted for : Primary osteoarthritis of right hip [M16.11]    Met with:  Patient and Spouse, Gene    PCP:  Madi Church MD              Insurance:  Medicare    Current Residence/ Living Arrangements:  independently at home             Current Services PTA:  No    Is patient agreeable to [address]: Yes    Is patient agreeable to outpatient physical therapy:  No    Freedom of choice provided: Yes         [address] Agency/Outpatient Therapy chosen:  [facility]. Referral faxed and notified Kalyani White from [facility] needed: No    Current home DME:  walker    Pharmacy:  [facility] [address]    Does Patient want to use MEDS to BEDS?([facility] only) Yes    Transportation Provider:  Patient and Family                       Discharge Plan:   Patient intends to discharge to Home    Patient does not need a wheeled walker. Anticipated discharge date 2/9/2021      Readmission Risk              Risk of Unplanned Readmission:        0           Electronically signed by: Amy Garcia RN on 2/9/2021 at 2:24 PM    Kalyani White from [facility] pt has been accepted for [facility].     Electronically signed by Amy Garcia RN on 2/9/2021 at 3:39 PM

## 2021-02-09 NOTE — PROGRESS NOTES
Physical Therapy    Facility/Department: [facility] MED SURG  Initial Assessment    NAME: Simona Avila  : 1950  MRN: [id number]    Date of Service: 2021    Discharge Recommendations: The patient would benefit from additional Physical  Therapy after discharge from the facility upon return to their Home. PT Equipment Recommendations  Equipment Needed: No  Other: Pt has RW to use at all times    Assessment   Body structures, Functions, Activity limitations: Decreased functional mobility ; Decreased ADL status; Decreased strength;Decreased endurance;Decreased balance  Assessment: Pt CGA for mobility with use of RW.  present for all education and training with handouts given. Pt given ice pack for anterior R hip. Pt will benefit from continued PT after d/c. Treatment Diagnosis: Impaired functional mobility s/p R ZULEYKA  Specific instructions for Next Treatment: progress gait/stairs, HEP  Prognosis: Excellent;Good  Decision Making: Low Complexity  History: s/p R ZULEYKA By Dr Ron Campbell 21  Exam: ROM, MMT, transfers, amb, balance, stairs, HEP  Clinical Presentation: Pt alert, cooperative, pleasant. Barriers to Learning: none  REQUIRES PT FOLLOW UP: Yes  Activity Tolerance  Activity Tolerance: Patient Tolerated treatment well       Patient Diagnosis(es): The encounter diagnosis was Primary osteoarthritis of right hip.     has a past medical history of Arthritis, Bone spur of left foot, Breast cancer ([address] Colon polyp, Degenerative joint disease of right hip, Depression, Diverticulosis, Elevated liver enzymes, GERD (gastroesophageal reflux disease), Hemorrhoids, Hyperlipidemia, Liver hemangioma, Migraine headache, Osteopenia, Overweight (BMI 25.0-29.9), Restless legs syndrome (RLS), and Tubular adenoma. has a past surgical history that includes Mastectomy; Colonoscopy; Tubal ligation; Colonoscopy (2 10 15); Spine surgery; Colonoscopy (N/A, 2/21/2018); Mastectomy, partial (Left); Mastectomy (Left); Cataract removal with implant (Bilateral); Breast biopsy (Left); Breast reconstruction (Left); pr removal of heel spur (Left, 11/5/2018); eye surgery; Total knee arthroplasty (Left, 5/21/2019); Endoscopy, colon, diagnostic; skin biopsy; and Total hip arthroplasty (Right, 2/9/2021). Restrictions  Restrictions/Precautions  Restrictions/Precautions: General Precautions, Weight Bearing, Fall Risk(no BP L UE)  Required Braces or Orthoses?: No  Implants present? : Metal implants(L TKA, R ZULEYKA, back sx)  Lower Extremity Weight Bearing Restrictions  Right Lower Extremity Weight Bearing: Weight Bearing As Tolerated(Full WBAT)  Position Activity Restriction  Other position/activity restrictions: PT OT darrianal per Dr Judy Tee,. anterior approach: no SLR  Vision/Hearing  Vision: Within Functional Limits  Hearing: Within functional limits(Has B hearing aides at home)     Subjective  General  Chart Reviewed: Yes  Patient assessed for rehabilitation services?: Yes  Additional Pertinent Hx: breast CA  Family / Caregiver Present: Yes()  Referring Practitioner: Anda Eisenmenger, MD  Referral Date : 02/09/21  Diagnosis: primary OA R hip  Follows Commands: Within Functional Limits  Subjective  Subjective: Pt sitting EOB, agreeable to PT OT co latrell. RN Scarletguero Mock.    Pain Screening  Patient Currently in Pain: Denies  Vital Signs  Patient Currently in Pain: Denies  Oxygen Therapy  SpO2: 91 %  O2 Device: None (Room air)  Patient Observation  Observations: O2 sats to 90%/HR to 140s post amb       Orientation  Orientation  Overall Orientation Status: Within Normal Limits  Social/Functional History  Social/Functional History  Lives With: Spouse  Type of Home: House  Home Layout: One level  Home Access: Stairs to enter with rails Entrance Stairs - Number of Steps: 2  Entrance Stairs - Rails: Left(grab bar)  Bathroom Shower/Tub: Walk-in shower  Bathroom Toilet: Handicap height  Bathroom Equipment: Built-in shower seat, Grab bars in shower, Hand-held shower, Grab bars around toilet  Bathroom Accessibility: Accessible, Walker accessible  Home Equipment: Rolling walker, Reacher  ADL Assistance: [address]: ( does most cooking/laundry)  Homemaking Responsibilities: Yes  Ambulation Assistance: Independent(using RW at night)  Transfer Assistance: Independent  Active : Yes  Mode of Transportation: [facility]  Occupation: Retired  Type of occupation:   Leisure & Hobbies: shopping  IADL Comments: sleeps in flat bed  Additional Comments:  is retired and available 24/7 - has 2 nurses next door. Cognition        Objective          AROM RLE (degrees)  RLE AROM: WFL  AROM LLE (degrees)  LLE AROM : WFL  AROM RUE (degrees)  RUE General AROM: See OT  AROM LUE (degrees)  LUE General AROM: See OT  Strength RLE  Strength RLE: WFL  Comment: grossly 4-/5 at knee/ankle, 3/5 hip flexion  Strength LLE  Strength LLE: WNL  Comment: Grossly 4/5  Strength RUE  Comment: See OT  Strength LUE  Comment: See OT     Sensation  Overall Sensation Status: WFL(pt denies)  Bed mobility  Comment: No formal bed mobility assessed as pt at EOB at start of session and up in chair end of session. No difficulty noted by RN or patient. Transfers  Sit to Stand: Contact guard assistance  Stand to sit: Contact guard assistance  Bed to Chair: Contact guard assistance  Comment: CGA for transfers with RW. Cues for hand placement, Pt performs STS x3 throughout session with RW.    Ambulation  Ambulation?: Yes  WB Status: Full WBAT R LE  Ambulation 1  Surface: level tile  Device: Rolling Walker  Assistance: Contact guard assistance Quality of Gait: slow cyn, antalgic gait and decreased stance time RLE, occasional circumducting-like motion at R hip  Gait Deviations: Decreased step length; Slow Cyn  Distance: [de-identified]'  Comments: Pt educated on RW technique and gait mechanics. Pt slightly abducts leg out to side to circumduct - per , pt was abducting leg and using adductors initially to ambulate prior to sx. Stairs/Curb  Stairs?: Yes  Stairs  # Steps : 2  Stairs Height: 6\"  Rails: Left ascending  Device: No Device  Assistance: Contact guard assistance  Comment: Educated on step to pattern with  present for training. Good carryover. Balance  Posture: Good  Sitting - Static: Good  Sitting - Dynamic: Good  Standing - Static: Good  Standing - Dynamic: Good;-;Fair;+  Comments: Standing balance with RW, fall risk. Other exercises  Other exercises?: Yes  Other exercises 1: Education on activity level, use of ice, positioning, no SLR  Other exercises 2: HEP with BLE seated program given  Other exercises 3: Handout and demo on car transfers -  present     Plan   Plan  Times per week: BID  Specific instructions for Next Treatment: progress gait/stairs, HEP  Current Treatment Recommendations: Strengthening, Balance Training, Functional Mobility Training, ROM, Transfer Training, Gait Training, Stair training, Endurance Training, Equipment Evaluation, Education, & procurement, Patient/Caregiver Education & Training, Safety Education & Training, Home Exercise Program, Positioning, Pain Management  Safety Devices  Type of devices:  All fall risk precautions in place, Call light within reach, Gait belt, Patient at risk for falls, Left in chair, Nurse notified(PAULA Hannah)      AM-PAC Score  AM-PAC Inpatient Mobility Raw Score : 14 (02/09/21 1504)  AM-PAC Inpatient T-Scale Score : 38.1 (02/09/21 1504)  Mobility Inpatient CMS 0-100% Score: 61.29 (02/09/21 1504)  Mobility Inpatient CMS G-Code Modifier : CL (02/09/21 1504)          Goals Short term goals  Time Frame for Short term goals: 1-2 tx  Short term goal 1: Pt to demo Mod I bed mobility. Short term goal 2: Pt to demo SBA transfers. Short term goal 3: Pt to amb 150' SBA. Short term goal 4: pt to perform 2-3 steps, SBA/CGA. Short term goal 5: Pt to demo good technique for HEP and car transfers. Patient Goals   Patient goals :  To go home       Therapy Time   Individual Concurrent Group Co-treatment   Time In 1500         Time Out 1534         Minutes 34         Timed Code Treatment Minutes: 10 Minutes       Roland Santiago, PT

## 2021-02-09 NOTE — PROGRESS NOTES
[address]   Occupational Therapy Evaluation  Date: 21  Patient Name: Raymond Farmer       Room: 8692/9215-57  MRN: [id number]  Account: [de-identified]   : 1950  (66 y.o.) Gender: female     Discharge Recommendations: The patient's needs are being met with no further Occupational Therapy recommended at discharge. Referring Practitioner: Peter Del Angel MD  Diagnosis: OA R hip       Treatment Diagnosis: impaired self care status  Past Medical History:  has a past medical history of Arthritis, Bone spur of left foot, Breast cancer ([address] Colon polyp, Degenerative joint disease of right hip, Depression, Diverticulosis, Elevated liver enzymes, GERD (gastroesophageal reflux disease), Hemorrhoids, Hyperlipidemia, Liver hemangioma, Migraine headache, Osteopenia, Overweight (BMI 25.0-29.9), Restless legs syndrome (RLS), and Tubular adenoma. Past Surgical History:   has a past surgical history that includes Mastectomy; Colonoscopy; Tubal ligation; Colonoscopy (2 10 15); Spine surgery; Colonoscopy (N/A, 2018); Mastectomy, partial (Left); Mastectomy (Left); Cataract removal with implant (Bilateral); Breast biopsy (Left); Breast reconstruction (Left); pr removal of heel spur (Left, 2018); eye surgery; Total knee arthroplasty (Left, 2019); Endoscopy, colon, diagnostic; skin biopsy; and Total hip arthroplasty (Right, 2021). Restrictions  Restrictions/Precautions: General Precautions, Weight Bearing, Fall Risk(no BP L UE)  Implants present? : Metal implants(L TKA, R ZULEYKA, back sx)  Other position/activity restrictions: PT OT eval per Dr Daksha Montano,.  anterior approach: no SLR  Right Lower Extremity Weight Bearing: Weight Bearing As Tolerated(Full WBAT)  Required Braces or Orthoses?: No     Vitals   O2 sats to 90%/HR to 140s post amb - RN notified    Subjective  Subjective: \"I feel pretty good \" Comments: RN ok'd OT/PT Evaluation this date. Patient seated at edge of bed upon therapy arrival, ready and agreeable for therapy  Overall Orientation Status: Within Normal Limits  Vision  Vision: Within Functional Limits  Hearing  Hearing: Within functional limits(Has B hearing aides at home)  Social/Functional History  Lives With: Spouse  Type of Home: House  Home Layout: One level  Home Access: Stairs to enter with rails  Entrance Stairs - Number of Steps: 2  Entrance Stairs - Rails: Left(grab bar)  Bathroom Shower/Tub: Walk-in shower  Bathroom Toilet: Handicap height  Bathroom Equipment: Built-in shower seat, Grab bars in shower, Hand-held shower, Grab bars around toilet  Bathroom Accessibility: Accessible, Walker accessible  Home Equipment: Rolling walker, Reacher  ADL Assistance: Independent  Homemaking Assistance: ( does most cooking/laundry)  Homemaking Responsibilities: Yes  Ambulation Assistance: Independent(using RW at night)  Transfer Assistance: Independent  Active : Yes  Mode of Transportation: SUV  Occupation: Retired  Type of occupation:   Leisure & Hobbies: shopping  IADL Comments: sleeps in flat bed  Additional Comments:  is retired and available 24/7 - has 2 nurses next door.        Objective      Cognition  Overall Cognitive Status: WFL   Sensation  Overall Sensation Status: WFL(pt denies)   ADL  Feeding: Independent  Grooming: Setup, Contact guard assistance  UE Bathing: Setup, Stand by assistance  LE Bathing: Contact guard assistance  UE Dressing: Stand by assistance, Setup(bra and overhead shirt)  LE Dressing: Minimal assistance(assist with hospital socks, able to thread BLEs through underwear and pants, pull up over hips in standing with CGA)  Toileting: Contact guard assistance Additional Comments: Patient participated in upper/lower body dressing tasks from bedside chair. Patient declined the need to urinate. Other assist levels based on clinical observation/reasoining. UE Function           LUE Strength  Gross LUE Strength: WFL     LUE Tone: Normotonic     LUE AROM (degrees)  LUE AROM : WFL     Left Hand AROM (degrees)  Left Hand AROM: WFL  RUE Strength  Gross RUE Strength: WFL      RUE Tone: Normotonic     RUE AROM (degrees)  RUE AROM : WFL     Right Hand AROM (degrees)  Right Hand AROM: WFL    Fine Motor Skills  Coordination  Movements Are Fluid And Coordinated:  Yes                           Mobility          Balance  Sitting Balance: Stand by assistance(seated edge of bed)  Standing Balance: Contact guard assistance  Standing Balance  Time: 1-2 minutes  Activity: standing for lower body dressing tasks  Comment: UE support as needed  Functional Mobility  Functional - Mobility Device: Rolling Walker  Activity: Other(in hospital room / in hallway)  Assist Level: Contact guard assistance        Transfers  Sit to stand: Contact guard assistance  Stand to sit: Contact guard assistance  Transfer Comments: from edge of bed / from bedside chair      Assessment  Performance deficits / Impairments: Decreased safe awareness, Decreased functional mobility , Decreased ADL status, Decreased balance, Decreased endurance  Treatment Diagnosis: impaired self care status  Prognosis: Good  Decision Making: Low Complexity  REQUIRES OT FOLLOW UP: Yes  Activity Tolerance: Patient Tolerated treatment well, Patient limited by fatigue         Functional Outcome Measures  AM-PAC Daily Activity Inpatient   How much help for putting on and taking off regular lower body clothing?: A Little  How much help for Bathing?: A Little  How much help for Toileting?: A Little  How much help for putting on and taking off regular upper body clothing?: A Little  How much help for taking care of personal grooming?: A Little How much help for eating meals?: None  AM-PAC Inpatient Daily Activity Raw Score: 19  AM-PAC Inpatient ADL T-Scale Score : 40.22  ADL Inpatient CMS 0-100% Score: 42.8  ADL Inpatient CMS G-Code Modifier : CK       Goals  Short term goals  Time Frame for Short term goals: by discharge, patient will  Short term goal 1: perform functional transfers/mobility during ADL routine with SBA using RW with Good safety techniques  Short term goal 2: increase standing tolerance to 5-7 minutes with UE support as needed during functional task  Short term goal 3: verbalize/demonstrate Good understanding of adaptive equipment/compensatory techniques and/or durable medical equipment for increased ease and safety with lower body ADL tasks and toileting/bathing    Plan  Safety Devices  Safety Devices in place: Yes  Type of devices: Call light within reach, Left in chair, Patient at risk for falls, Gait belt, Nurse notified(spouse at side)     Plan  Times per week: 5-7  Current Treatment Recommendations: Endurance Training, Patient/Caregiver Education & Training, Equipment Evaluation, Education, & procurement, Self-Care / ADL, Balance Training, Functional Mobility Training, Safety Education & Training, Pain Management, Home Management Training  OT Education  OT Education: OT Role, Plan of Care, Precautions, ADL Adaptive Strategies, Transfer Training, Equipment, Orientation, Family Education       OT Individual Minutes  Time In: 6428  Time Out: 6153  Minutes: 23    Electronically signed by Kortney Henriquez OT on 2/9/21 at 5:09 PM EST

## 2021-02-09 NOTE — OP NOTE
Operative Note      Patient: Lolita Greenwood  YOB: 1950  MRN: [id number]    Date of Procedure: 2/9/2021    Pre-Op Diagnosis: DJD RIGHT HIP    Post-Op Diagnosis: Same       Procedure(s):  HIP TOTAL ARTHROPLASTY ASI    Surgeon(s):  Mami Devine MD    Assistant:   Resident: DO Bernardo Moreno CST  Anesthesia: General    Estimated Blood Loss (mL): 250    Complications: None    Specimens:   * No specimens in log *    Implants:  Implant Name Type Inv. Item Serial No.  Lot No. LRB No. Used Action   G7 FINNED 4 HOLE SHELL 56F Hip G7 FINNED 4 HOLE SHELL 56F  [facility] [employer]- [id number] Right 1 Implanted   SCREW BONE L25MM DIA6. 5MM ACET HIP TI ST TRIL  SCREW BONE L25MM DIA6. 5MM ACET HIP TI ST TRIL  [employer] [id number] Right 1 Implanted   LINER ACET SZ F [id number] NEUT HIP XLPE ARCOMXL G7  LINER ACET SZ F [id number] NEUT HIP XLPE ARCOMXL G7  [employer] [id number] Right 1 Implanted   STEM FEM SZ 8 L136MM 133DEG HIP PPS STD OFFSET TYP 1 TAPR  STEM FEM SZ 8 L136MM 133DEG HIP PPS STD OFFSET TYP 1 TAPR  [employer]- [id number] Right 1 Implanted   HEAD FEM [id number] -6MM OFFSET HIP CO CHROM TYP 1 TAPR  HEAD FEM [id number] -6MM OFFSET HIP CO CHROM TYP 1 TAPR  [employer]WD [id number] Right 1 Implanted         Drains: * No LDAs found *    Findings: Severe DJD right hip    Detailed Description of Procedure:     Patient is a 66 y.o. female with a long standing history of DJD of the right hip. The patient has failed all types of conservative treatments including physical therapy, NSAIDs, weight loss counseling, and intra-articular steroid injection. The patient's activities of daily living have become significantly restricted. Having failed conservative treatment, the patient has agreed to proceed  with total hip arthroplasty aware of all risks highlighted in the surgical consent form. The patient received 2 grams of Ancef in the holding area and then taken to the operative suite where general anesthesia as administered. Both lower extremities were prepped and draped for an ASI approach. Time-out was called to verify laterality. The #9 retractor was placed over the proximal femur and with assistance pf fluoroscopy the anterior Incision was centered approximately with reference to the femoral neck. After the incision, the subcutaneous tissue was taken down to the fascia, which was then split with the [place]. The interval between the sartorius and the tensor muscle was identified and retractors positioned. Electrocautery was used to coagulate any of the ascending circumflex vessels in the peritrochanteric area. The femoral neck was palpated and the #7 and #6 retractor placed below and above and the #9 retractor over the acetabular rim. Capsulectomy was carried out in a the routine fashion. With the assistance of fluoroscopy, the previously template osteotomy site was identified with reference to the lesser trochanter. The first cut was made with an osculation saw, then, a second cut at the head/neck junction was performed. The femoral neck ring was then removed with a osteotomy and the femoral head removed with a powered steinmann pin. The head was subsequently measured. Retractors were placed about the acetabulum and labeled resection preformed as well as the removal of any peripheral osteophytes. Appropriate sized acetabular reamer size 51 mm 53 mm 55 mm were placed and at 40 degrees of abduction and 10 degrees of anteversion until the tear drop attained and good peripheral contact and chatter verified both manually and visually with fluoroscopy. The appropriate size G7 acetabulum was then inserted at the above orientation  and seated with a heavy mallet to the appropriate placement. To verify fixation one 6.5 mm cancellous screw was placed in the posterior superior quadrant to ensure stability. . The above mentioned liner was inserted, impacted, and fixation verified. The proximal femur was exposed by performing a posterior capsular release, dropping the foot off the table  and placement of the leg in figure 4 with proper placement of the # 8 and #5 retractors. The femoral canal was then assessed with a cookie cutter, canal finder, and lateral sizer. Broaching with rasps was carried out in approximately 10-15 degrees of anteversion. This was performed in the increasing sizes until appropriate fit, fill, and rotational stability was obtained. Various offset trunions and head/neck lengths  were trailed until appropriate soft-tissue tensioning, offset, and leg-length was re-established. The trial component were inspected for sizing and positioning with fluoroscopy. Once satisfied, the trial components were removed and the permanent femoral stem was inserted and impacted until no further movement. Re-trailing was carried out, the above sized head/neck combination was placed on the clean-and-dried mouse taper and the hip reduced. Repeat fluoroscopy was carried out. ROM and stability and leg lengths were re-evaluated. The entire hip area was injected with 100ml of the orthopedic cocktail. A Irrisept lavage was carried out for 1 minutes. This was then re-irrigated. The soft tissues were sprayed with platelet rich and platelet poor plasma. Hemostasis was excellent. The fascia was closed with a #1 Strata-Fix, the subcutaneous tissue was closed with a 2-0 Monocryl Strata-Fix, and Zopline for the skin. This was covered with Aquacel dressing. The patient was awakened and taken to PACU in good condition.        Marisol Valles MD           Electronically signed by Marisol Valles MD on 2/9/2021 at 11:51 AM

## 2021-02-09 NOTE — ANESTHESIA PRE PROCEDURE
Department of Anesthesiology  Preprocedure Note       Name:  Keely Kaplan   Age:  66 y.o.  :  1950                                          MRN:  [id number]         Date:  2021      Surgeon: Sagra Maier):  Jl Oropeza MD    Procedure: Procedure(s):  HIP TOTAL ARTHROPLASTY ASI    Medications prior to admission:   Prior to Admission medications    Medication Sig Start Date End Date Taking? Authorizing Provider   ibuprofen (ADVIL;MOTRIN) 200 MG tablet Take 200 mg by mouth every 6 hours as needed for Pain    Historical Provider, MD   atorvastatin (LIPITOR) 20 MG tablet Take 1 tablet by mouth daily 21   Helen Figueredo MD   citalopram (CELEXA) 20 MG tablet TAKE ONE TABLET BY MOUTH ONCE DAILY 21   Helen Figueredo MD   pramipexole (MIRAPEX) 0.5 MG tablet TAKE ONE TABLET BY MOUTH ONCE NIGHTLY 21   Helen Figuerdeo MD   Magnesium 500 MG CAPS Take 1 capsule by mouth daily    Historical Provider, MD   melatonin 5 MG TABS tablet Take 10 mg by mouth nightly as needed    Historical Provider, MD   SUMAtriptan (IMITREX) 50 MG tablet Take at onset of headache; may repeat in 2 hours; no more than 2 in 24 hours. 10/8/19   Helen Figueredo MD   CALCIUM CITRATE PO Take 1,200 mg by mouth daily calciu    Historical Provider, MD   Multiple Vitamins-Minerals (THERAPEUTIC MULTIVITAMIN-MINERALS) tablet Take 1 tablet by mouth daily. Historical Provider, MD   omeprazole (PRILOSEC) 20 MG capsule Take 20 mg by mouth daily.       Historical Provider, MD       Current medications:    Current Facility-Administered Medications   Medication Dose Route Frequency Provider Last Rate Last Admin    ceFAZolin (ANCEF) 2000 mg in dextrose 5 % 50 mL IVPB  2,000 mg Intravenous On Call to [address], MD        dexamethasone (PF) (DECADRON) injection 10 mg  10 mg Intravenous Once Jl Oropeza MD        acetaminophen (TYLENOL) tablet 1,000 mg  1,000 mg Oral Once Jl Oropeza MD  gabapentin (NEURONTIN) tablet 600 mg  600 mg Oral Once Tila Goodwin MD        scopolamine (TRANSDERM-SCOP) transdermal patch 1 patch  1 patch Transdermal Once Tila Goodwin MD        lactated ringers infusion   Intravenous Continuous Cherie Diaz MD        sodium chloride flush 0.9 % injection 10 mL  10 mL Intravenous 2 times per day Cherie Diaz MD        sodium chloride flush 0.9 % injection 10 mL  10 mL Intravenous PRN Cherie Diaz MD        lidocaine PF 1 % injection 1 mL  1 mL Intradermal Once PRN Cherie Diaz MD           Allergies:     Allergies   Allergen Reactions    Darvon [Propoxyphene] Nausea And Vomiting and Other (See Comments)     Severe headaches    Morphine Nausea And Vomiting and Other (See Comments)     Severe headaches       Problem List:    Patient Active Problem List   Diagnosis Code    Diverticulosis K57.90    Colon polyp K63.5    Hyperlipidemia with target LDL less than 130 E78.5    Migraine headache G43.909    BMI 25.0-25.9,adult Z68.25    Liver hemangioma D18.03    Elevated liver enzymes R74.8    Migraine without aura and without status migrainosus, not intractable G43.009    Hemorrhoids K64.9    Tubular adenoma D36.9    Diverticulosis of intestine without bleeding K57.90    Gastroesophageal reflux disease without esophagitis K21.9    Snoring R06.83    Exercise counseling Z71.82    Restless leg syndrome, uncontrolled G25.81    Diarrhea R19.7    Bone spur of toe of left foot M77.52    Primary osteoarthritis of left foot M19.072    Pain, foot, left, chronic M79.672, G89.29    Primary osteoarthritis of left knee M17.12    Degenerative joint disease of right hip M16.11       Past Medical History:        Diagnosis Date    Arthritis     Bone spur of left foot     Breast cancer ([facility] [address] 1998    left with reconstruction, no chemo or radiation    Colon polyp     Degenerative joint disease of right hip     Depression     Diverticulosis  Elevated liver enzymes     GERD (gastroesophageal reflux disease)     Hemorrhoids     Hyperlipidemia     Liver hemangioma     Migraine headache     Osteopenia     Overweight (BMI 25.0-29.9) 2015    Restless legs syndrome (RLS)     on Mirapex    Tubular adenoma        Past Surgical History:        Procedure Laterality Date    BREAST BIOPSY Left     cancer found    BREAST RECONSTRUCTION Left     using abdominal tissue    CATARACT REMOVAL WITH IMPLANT Bilateral     COLONOSCOPY      COLONOSCOPY  2 10 15    DIVERTICULOSIS AND HEMORRHOIDS , TUBULAR ADENOMA     COLONOSCOPY N/A 2018    COLONOSCOPY WITH BIOPSY performed by Abe Leger MD at [address], COLON, DIAGNOSTIC      EYE SURGERY      MASTECTOMY      MASTECTOMY Left     remainder of breast was removed because the margins were not clear on the partial mastectomy    MASTECTOMY, PARTIAL Left     for cancer    [place] REMOVAL OF HEEL SPUR Left 2018    EXOSTECTOMY DORSAL FOOT performed by Lane Nair DPM at [address]      L3,4,5, fusion    TOTAL KNEE ARTHROPLASTY Left 2019    KNEE TOTAL ARTHROPLASTY performed by Yolis Rosario MD at [address] [place] History:    Social History     Tobacco Use    Smoking status: Former Smoker     Packs/day: 0.50     Years: 3.00     Pack years: 1.50     Quit date:      Years since quittin.1    Smokeless tobacco: Never Used   Substance Use Topics    Alcohol use: Yes     Comment: 1-2 per week                                Counseling given: Not Answered      Vital Signs (Current):   Vitals:    21 0822   BP: (!) 163/82   Pulse: 106   Resp: 20   Temp: 97.3 °F (36.3 °C)   TempSrc: Temporal   SpO2: 97%   Weight: 152 lb (68.9 kg)   Height: 5' 3\" (1.6 m)                                              BP Readings from Last 3 Encounters:   21 (!) 163/82   21 (!) 147/80   10/30/20 120/60       NPO Status: BMI:   Wt Readings from Last 3 Encounters:   02/09/21 152 lb (68.9 kg)   01/26/21 152 lb (68.9 kg)   12/14/20 150 lb (68 kg)     Body mass index is 26.93 kg/m². CBC:   Lab Results   Component Value Date    WBC 8.4 01/26/2021    RBC 4.59 01/26/2021    RBC 4.46 06/05/2012    HGB 13.6 01/26/2021    HCT 41.7 01/26/2021    MCV 90.8 01/26/2021    RDW 14.4 01/26/2021     01/26/2021     06/05/2012       CMP:   Lab Results   Component Value Date     01/26/2021    K 4.2 01/26/2021     01/26/2021    CO2 23 01/26/2021    BUN 11 01/26/2021    CREATININE 0.74 01/26/2021    GFRAA >60 01/26/2021    LABGLOM >60 01/26/2021    GLUCOSE 106 01/26/2021    GLUCOSE 122 06/05/2012    PROT 7.1 07/01/2020    CALCIUM 9.3 01/26/2021    BILITOT 0.38 07/01/2020    ALKPHOS 86 07/01/2020    AST 18 07/01/2020    ALT 28 07/01/2020       POC Tests: No results for input(s): POCGLU, POCNA, POCK, POCCL, POCBUN, POCHEMO, POCHCT in the last 72 hours.     Coags: No results found for: PROTIME, INR, APTT    HCG (If Applicable): No results found for: PREGTESTUR, PREGSERUM, HCG, HCGQUANT     ABGs: No results found for: PHART, PO2ART, LPV9TXB, SXI6OWM, BEART, F6TFKFYD     Type & Screen (If Applicable):  No results found for: LABABO, LABRH    Drug/Infectious Status (If Applicable):  Lab Results   Component Value Date    HEPCAB NONREACTIVE 02/03/2015       COVID-19 Screening (If Applicable):   Lab Results   Component Value Date    COVID19 Not Detected 02/05/2021         Anesthesia Evaluation  Patient summary reviewed and Nursing notes reviewed no history of anesthetic complications:   Airway: Mallampati: IV  TM distance: >3 FB   Neck ROM: limited  Mouth opening: > = 3 FB Dental:    (+) partials      Pulmonary:normal exam                               Cardiovascular:Negative CV ROS                      Neuro/Psych:

## 2021-02-09 NOTE — ANESTHESIA POSTPROCEDURE EVALUATION
Department of Anesthesiology  Postprocedure Note    Patient: Lolita Greenwood  MRN: [id number]  YOB: 1950  Date of evaluation: 2/9/2021  Time:  1:41 PM     Procedure Summary     Date: 02/09/21 Room / Location: [address] [place] [address] / [facility]: HEATHER SOLIS    Anesthesia Start: 1008 Anesthesia Stop: 4741    Procedure: HIP TOTAL ARTHROPLASTY ASI (Right Hip) Diagnosis: (DJD RIGHT HIP)    Surgeons: Mami Devine MD Responsible Provider: Sushila Lehman MD    Anesthesia Type: general ASA Status: 2          Anesthesia Type: general    Juan David Phase I: Juan David Score: 9    Juan David Phase II:      Last vitals: Reviewed and per EMR flowsheets.        Anesthesia Post Evaluation    Comments: POST- ANESTHESIA EVALUATION       Pt Name: Lolita Greenwood  MRN: [id number]  [place]: 1950  Date of evaluation: 2/9/2021  Time:  1:41 PM      BP (!) 116/43   Pulse 104   Temp 98.2 °F (36.8 °C) (Oral)   Resp 12   Ht 5' 3\" (1.6 m)   Wt 152 lb (68.9 kg)   SpO2 98%   BMI 26.93 kg/m²      Consciousness Level  Awake  Cardiopulmonary Status  Stable  Pain Adequately Treated YES  Nausea / Vomiting  NO  Adequate Hydration  YES  Anesthesia Related Complications NONE      Electronically signed by Sophia Barker MD on 2/9/2021 at 1:41 PM

## 2021-02-10 NOTE — FLOWSHEET NOTE
Patient admitted to room 2048 from PACU.  at bedside. assessment and orientation to the room and call system completed. Plan for the day discussed with the patient and her .

## 2021-02-10 NOTE — FLOWSHEET NOTE
Discharge instructions reviewed with the patient and her spouse. allquestions answered. Patient discharged home with the prescription filled from meds to beds andknee high senthil hose. Patient assisted into the car and tolerated very well.

## 2021-02-11 ENCOUNTER — TELEPHONE (OUTPATIENT)
Dept: ORTHOPEDIC SURGERY | Age: 71
End: 2021-02-11

## 2021-02-11 DIAGNOSIS — Z96.652 HISTORY OF TOTAL LEFT KNEE REPLACEMENT: Primary | ICD-10-CM

## 2021-02-11 RX ORDER — ONDANSETRON 4 MG/1
4 TABLET, FILM COATED ORAL 3 TIMES DAILY PRN
Qty: 15 TABLET | Refills: 0 | Status: SHIPPED | OUTPATIENT
Start: 2021-02-11 | End: 2021-04-27

## 2021-02-11 NOTE — TELEPHONE ENCOUNTER
called to see if Dr. Patti Bañuelos sent anything to the pharmacy for the patient's nausea. I told him that zofran, an ant-nausea medication was sent to [facility] on [address] earlier today.

## 2021-02-11 NOTE — TELEPHONE ENCOUNTER
Nina Cancel with [facility] calling in to let you know that they are only doing Evaluation for OT only, and continuing physical therapy.

## 2021-02-11 NOTE — TELEPHONE ENCOUNTER
Patient had ZULEYKA on Tuesday and is experiencing a lot of nausea now. Can you send something in? She uses [facility] on [address].

## 2021-02-18 DIAGNOSIS — M16.11 PRIMARY OSTEOARTHRITIS OF RIGHT HIP: ICD-10-CM

## 2021-02-18 RX ORDER — OXYCODONE HYDROCHLORIDE AND ACETAMINOPHEN 5; 325 MG/1; MG/1
1-2 TABLET ORAL EVERY 4 HOURS PRN
Qty: 42 TABLET | Refills: 0 | Status: SHIPPED | OUTPATIENT
Start: 2021-02-18 | End: 2021-02-25

## 2021-02-22 DIAGNOSIS — Z96.641 STATUS POST TOTAL REPLACEMENT OF RIGHT HIP: Primary | ICD-10-CM

## 2021-02-23 ENCOUNTER — OFFICE VISIT (OUTPATIENT)
Dept: ORTHOPEDIC SURGERY | Age: 71
End: 2021-02-23

## 2021-02-23 VITALS — TEMPERATURE: 97 F

## 2021-02-23 DIAGNOSIS — Z96.641 STATUS POST TOTAL HIP REPLACEMENT, RIGHT: Primary | ICD-10-CM

## 2021-02-23 PROCEDURE — 99024 POSTOP FOLLOW-UP VISIT: CPT | Performed by: PHYSICIAN ASSISTANT

## 2021-02-23 NOTE — PROGRESS NOTES
Pt. ASI restrictions given. WBAT with/without assisted devices. Continue anticoagulation protocol. RTO 4 weeks. Call if any problems or issues prior to that time. Please note that this chart was generated using voice recognition Dragon dictation software. Although every effort was made to ensure the accuracy of this automated transcription, some errors in transcription may have occurred.

## 2021-03-04 ENCOUNTER — TELEPHONE (OUTPATIENT)
Dept: ORTHOPEDIC SURGERY | Age: 71
End: 2021-03-04

## 2021-03-05 NOTE — TELEPHONE ENCOUNTER
Spoke with rob hernandez and let them know we faxed over paperwork  signed. This was confirmed via fax scanned into patients media.

## 2021-03-08 DIAGNOSIS — Z96.641 STATUS POST TOTAL REPLACEMENT OF RIGHT HIP: Primary | ICD-10-CM

## 2021-03-09 RX ORDER — OXYCODONE HYDROCHLORIDE AND ACETAMINOPHEN 5; 325 MG/1; MG/1
1 TABLET ORAL EVERY 6 HOURS PRN
Qty: 28 TABLET | Refills: 0 | Status: SHIPPED | OUTPATIENT
Start: 2021-03-09 | End: 2021-03-16

## 2021-03-24 ENCOUNTER — OFFICE VISIT (OUTPATIENT)
Dept: ORTHOPEDIC SURGERY | Age: 71
End: 2021-03-24

## 2021-03-24 VITALS — BODY MASS INDEX: 26.93 KG/M2 | WEIGHT: 152 LBS | HEIGHT: 63 IN | TEMPERATURE: 97 F

## 2021-03-24 DIAGNOSIS — Z96.641 STATUS POST TOTAL REPLACEMENT OF RIGHT HIP: Primary | ICD-10-CM

## 2021-03-24 PROCEDURE — 99024 POSTOP FOLLOW-UP VISIT: CPT | Performed by: ORTHOPAEDIC SURGERY

## 2021-03-24 NOTE — PROGRESS NOTES
Rekha Awad returns today status post right total of arthroplasty on 2/9/2021. She says her hip pain is great. She has a little bit of sciatica remnants. She is very happy how it feels that she has severe pain prior to surgery. Physical examination notes that she has no pain on flexion internal ex rotation of right hip. She has some mild pain on straight leg raise. She does have a modest leg length discrepancy however she is good to be fitted for a lift on her left shoe. She has she is wearing 1 in the left shoe at this point and is helping out significantly for her. No new x-rays taken today    Status post right total hip ASI 2/9/2021 doing very well    Plan  Patient care is to continue outpatient physical therapy. She is good to be fitted for a small lift on the left shoe.   We will see her back in 4 months call if any problems prior to that time

## 2021-03-30 ASSESSMENT — ENCOUNTER SYMPTOMS
CONSTIPATION: 0
COLOR CHANGE: 0
VOMITING: 0
DIARRHEA: 0
NAUSEA: 0

## 2021-03-30 NOTE — PROGRESS NOTES
DAILY 1/11/21  Yes Chayo Donis MD   pramipexole (MIRAPEX) 0.5 MG tablet TAKE ONE TABLET BY MOUTH ONCE NIGHTLY 1/11/21  Yes Chayo Donis MD   Magnesium 500 MG CAPS Take 1 capsule by mouth daily   Yes Historical Provider, MD   melatonin 5 MG TABS tablet Take 10 mg by mouth nightly as needed   Yes Historical Provider, MD   SUMAtriptan (IMITREX) 50 MG tablet Take at onset of headache; may repeat in 2 hours; no more than 2 in 24 hours. 10/8/19  Yes Chayo Donis MD   CALCIUM CITRATE PO Take 1,200 mg by mouth daily calciu   Yes Historical Provider, MD   Multiple Vitamins-Minerals (THERAPEUTIC MULTIVITAMIN-MINERALS) tablet Take 1 tablet by mouth daily. Yes Historical Provider, MD   omeprazole (PRILOSEC) 20 MG capsule Take 20 mg by mouth daily. Yes Historical Provider, MD       Review of Systems   Constitutional: Negative for chills, diaphoresis, fatigue, fever and unexpected weight change. Cardiovascular: Negative for leg swelling. Gastrointestinal: Negative for constipation, diarrhea, nausea and vomiting. Musculoskeletal: Positive for arthralgias, gait problem and joint swelling. Skin: Negative for color change, pallor, rash and wound. Neurological: Negative for weakness and numbness. Lower Extremity Physical Examination:     Vitals: There were no vitals filed for this visit. General: AAO x 3 in NAD. Integument:   Warm, dry, supple, Bilateral.  Open lesion absent, Bilateral.        Vascular: DP and PT pulses palpable 2/4, Bilateral.  CFT <3 seconds, Bilateral.  Hair growth absent to the level of the digits, Bilateral.  Edema present, Left. Varicosities absent, Bilateral. Erythema present, Left, increased temperature left midfoot. Neurological:  Sensation present to light touch to level of digits, Bilateral.    Musculoskeletal: Muscle strength 5/5, Left. When standing, left hip drop. When sitting, left leg is 1 inch shorter than the right.            Asessment:

## 2021-04-01 ENCOUNTER — OFFICE VISIT (OUTPATIENT)
Dept: PODIATRY | Age: 71
End: 2021-04-01
Payer: MEDICARE

## 2021-04-01 VITALS — BODY MASS INDEX: 26.58 KG/M2 | WEIGHT: 150 LBS | HEIGHT: 63 IN

## 2021-04-01 DIAGNOSIS — R60.0 EDEMA OF LEFT FOOT: ICD-10-CM

## 2021-04-01 DIAGNOSIS — R26.9 GAIT ABNORMALITY: ICD-10-CM

## 2021-04-01 DIAGNOSIS — M19.072 PRIMARY OSTEOARTHRITIS OF LEFT FOOT: Primary | ICD-10-CM

## 2021-04-01 DIAGNOSIS — M21.70 LEG LENGTH DISCREPANCY: ICD-10-CM

## 2021-04-01 DIAGNOSIS — M79.672 FOOT PAIN, LEFT: ICD-10-CM

## 2021-04-01 PROCEDURE — 1123F ACP DISCUSS/DSCN MKR DOCD: CPT | Performed by: PODIATRIST

## 2021-04-01 PROCEDURE — G8427 DOCREV CUR MEDS BY ELIG CLIN: HCPCS | Performed by: PODIATRIST

## 2021-04-01 PROCEDURE — 1090F PRES/ABSN URINE INCON ASSESS: CPT | Performed by: PODIATRIST

## 2021-04-01 PROCEDURE — 99213 OFFICE O/P EST LOW 20 MIN: CPT | Performed by: PODIATRIST

## 2021-04-01 PROCEDURE — G8400 PT W/DXA NO RESULTS DOC: HCPCS | Performed by: PODIATRIST

## 2021-04-01 PROCEDURE — 1036F TOBACCO NON-USER: CPT | Performed by: PODIATRIST

## 2021-04-01 PROCEDURE — 4040F PNEUMOC VAC/ADMIN/RCVD: CPT | Performed by: PODIATRIST

## 2021-04-01 PROCEDURE — G8417 CALC BMI ABV UP PARAM F/U: HCPCS | Performed by: PODIATRIST

## 2021-04-01 PROCEDURE — 3017F COLORECTAL CA SCREEN DOC REV: CPT | Performed by: PODIATRIST

## 2021-04-02 ENCOUNTER — HOSPITAL ENCOUNTER (OUTPATIENT)
Facility: CLINIC | Age: 71
Discharge: HOME | End: 2021-04-04
Payer: MEDICARE

## 2021-04-02 ENCOUNTER — HOSPITAL ENCOUNTER (OUTPATIENT)
Dept: GENERAL RADIOLOGY | Facility: CLINIC | Age: 71
Discharge: HOME | End: 2021-04-04
Payer: MEDICARE

## 2021-04-02 DIAGNOSIS — M19.072 PRIMARY OSTEOARTHRITIS OF LEFT FOOT: ICD-10-CM

## 2021-04-02 DIAGNOSIS — M21.70 LEG LENGTH DISCREPANCY: ICD-10-CM

## 2021-04-02 PROCEDURE — 77073 BONE LENGTH STUDIES: CPT

## 2021-04-06 ENCOUNTER — TELEPHONE (OUTPATIENT)
Dept: PODIATRY | Age: 71
End: 2021-04-06

## 2021-04-06 DIAGNOSIS — R26.9 GAIT ABNORMALITY: ICD-10-CM

## 2021-04-06 DIAGNOSIS — M21.70 LEG LENGTH DISCREPANCY: Primary | ICD-10-CM

## 2021-04-06 NOTE — TELEPHONE ENCOUNTER
----- Message from Rafael Caballero DPM sent at 4/6/2021  8:31 AM EDT -----  Let her know that her left leg is 2 cm shorter than her right. Can we build a lift about 2 cm? Or we can send her to [address] [place] [address]?

## 2021-04-27 ENCOUNTER — OFFICE VISIT (OUTPATIENT)
Dept: GASTROENTEROLOGY | Age: 71
End: 2021-04-27
Payer: MEDICARE

## 2021-04-27 ENCOUNTER — TELEPHONE (OUTPATIENT)
Dept: GASTROENTEROLOGY | Age: 71
End: 2021-04-27

## 2021-04-27 VITALS
RESPIRATION RATE: 12 BRPM | BODY MASS INDEX: 26.47 KG/M2 | HEART RATE: 87 BPM | DIASTOLIC BLOOD PRESSURE: 81 MMHG | HEIGHT: 63 IN | SYSTOLIC BLOOD PRESSURE: 139 MMHG | WEIGHT: 149.4 LBS | TEMPERATURE: 97.2 F | OXYGEN SATURATION: 100 %

## 2021-04-27 DIAGNOSIS — K21.9 GASTROESOPHAGEAL REFLUX DISEASE WITHOUT ESOPHAGITIS: ICD-10-CM

## 2021-04-27 DIAGNOSIS — D18.03 LIVER HEMANGIOMA: ICD-10-CM

## 2021-04-27 DIAGNOSIS — K57.90 DIVERTICULOSIS: ICD-10-CM

## 2021-04-27 DIAGNOSIS — D36.9 ADENOMATOUS POLYPS: Primary | ICD-10-CM

## 2021-04-27 PROCEDURE — 1123F ACP DISCUSS/DSCN MKR DOCD: CPT | Performed by: INTERNAL MEDICINE

## 2021-04-27 PROCEDURE — G8400 PT W/DXA NO RESULTS DOC: HCPCS | Performed by: INTERNAL MEDICINE

## 2021-04-27 PROCEDURE — 1090F PRES/ABSN URINE INCON ASSESS: CPT | Performed by: INTERNAL MEDICINE

## 2021-04-27 PROCEDURE — G8427 DOCREV CUR MEDS BY ELIG CLIN: HCPCS | Performed by: INTERNAL MEDICINE

## 2021-04-27 PROCEDURE — 99214 OFFICE O/P EST MOD 30 MIN: CPT | Performed by: INTERNAL MEDICINE

## 2021-04-27 PROCEDURE — 4040F PNEUMOC VAC/ADMIN/RCVD: CPT | Performed by: INTERNAL MEDICINE

## 2021-04-27 PROCEDURE — G8417 CALC BMI ABV UP PARAM F/U: HCPCS | Performed by: INTERNAL MEDICINE

## 2021-04-27 PROCEDURE — 3017F COLORECTAL CA SCREEN DOC REV: CPT | Performed by: INTERNAL MEDICINE

## 2021-04-27 PROCEDURE — 1036F TOBACCO NON-USER: CPT | Performed by: INTERNAL MEDICINE

## 2021-04-27 RX ORDER — MELOXICAM 15 MG/1
15 TABLET ORAL DAILY
COMMUNITY
End: 2022-03-29

## 2021-04-27 ASSESSMENT — ENCOUNTER SYMPTOMS
ABDOMINAL PAIN: 0
NAUSEA: 1
DIARRHEA: 1
COUGH: 0
WHEEZING: 0
TROUBLE SWALLOWING: 0
VOMITING: 0
RECTAL PAIN: 0
BLOOD IN STOOL: 0
ANAL BLEEDING: 0
CONSTIPATION: 0
CHOKING: 0
BACK PAIN: 0
ABDOMINAL DISTENTION: 0

## 2021-04-27 NOTE — PROGRESS NOTES
GI CLINIC FOLLOW UP    INTERVAL HISTORY:   No referring provider defined for this encounter. Chief Complaint   Patient presents with    Follow-up     Discuss colon/egd            HISTORY OF PRESENT ILLNESS: Jessica Osei is a 66 y.o. female , referred for evaluation of* below  . Gastroesophageal reflux disease without esophagitis    2. Diarrhea, unspecified type    3. Diverticulosis of intestine without bleeding, unspecified intestinal tract location    4. Elevated liver enzymes    5. Liver hemangioma    6. Hx of colonic polyps      Patient here for follow-up we will see her for the above issues  Asymptomatic today, diarrhea resolved, reflux is controlled. She is due for repeat colonoscopy and she never had an EGD for the acid reflux he would need 1  She denied any odynophagia or dysphagia denied any bleeding denied any weight loss denied fever or chills  Please refer to the last visit for details her anemia work-up was all negative. We will continue with the Prilosec and symptomatic treatment      Past Medical,Family, and Social History reviewed and does contribute to the patient presentingcondition. Patient's PMH/PSH,SH,PSYCH Hx, MEDs, ALLERGIES, and ROS were all reviewed and updated in the appropriate sections.     PAST MEDICAL HISTORY:  Past Medical History:   Diagnosis Date    Arthritis     Bone spur of left foot     Breast cancer ([facility] [address] 1998    left with reconstruction, no chemo or radiation    Colon polyp     Degenerative joint disease of right hip     Depression     Diverticulosis     Elevated liver enzymes     GERD (gastroesophageal reflux disease)     Hemorrhoids     Hyperlipidemia     Liver hemangioma     Migraine headache     Osteopenia     Overweight (BMI 25.0-29.9) 05/11/2015    Restless legs syndrome (RLS)     on Mirapex    Tubular adenoma        Past Surgical History:   Procedure Laterality Date    BREAST BIOPSY Left     cancer found    BREAST RECONSTRUCTION Left     using abdominal tissue    CATARACT REMOVAL WITH IMPLANT Bilateral     COLONOSCOPY      COLONOSCOPY  2 10 15    DIVERTICULOSIS AND HEMORRHOIDS , TUBULAR ADENOMA     COLONOSCOPY N/A 2/21/2018    COLONOSCOPY WITH BIOPSY performed by Evy España MD at [facility], [place], DIAGNOSTIC      EYE SURGERY      MASTECTOMY      MASTECTOMY Left     remainder of breast was removed because the margins were not clear on the partial mastectomy    MASTECTOMY, PARTIAL Left     for cancer    DE REMOVAL OF HEEL SPUR Left 11/5/2018    EXOSTECTOMY DORSAL FOOT performed by Doris Conroy DPM at [address]      L3,4,5, fusion   [facility] Right 2/9/2021    HIP TOTAL ARTHROPLASTY ASI performed by Evans Concepcion MD at [address] [facility] Left 5/21/2019    KNEE TOTAL ARTHROPLASTY performed by Evans Concepcion MD at [address]:    Current Outpatient Medications:     meloxicam (MOBIC) 15 MG tablet, Take 15 mg by mouth daily, Disp: , Rfl:     atorvastatin (LIPITOR) 20 MG tablet, Take 1 tablet by mouth daily, Disp: 90 tablet, Rfl: 3    citalopram (CELEXA) 20 MG tablet, TAKE ONE TABLET BY MOUTH ONCE DAILY, Disp: 90 tablet, Rfl: 3    pramipexole (MIRAPEX) 0.5 MG tablet, TAKE ONE TABLET BY MOUTH ONCE NIGHTLY, Disp: 90 tablet, Rfl: 3    Magnesium 500 MG CAPS, Take 1 capsule by mouth daily, Disp: , Rfl:     melatonin 5 MG TABS tablet, Take 10 mg by mouth nightly as needed, Disp: , Rfl:     SUMAtriptan (IMITREX) 50 MG tablet, Take at onset of headache; may repeat in 2 hours; no more than 2 in 24 hours. , Disp: 9 tablet, Rfl: 11    CALCIUM CITRATE PO, Take 1,200 mg by mouth daily calciu, Disp: , Rfl:     Multiple Vitamins-Minerals (THERAPEUTIC MULTIVITAMIN-MINERALS) tablet, Take 1 tablet by mouth daily. , Disp: , Rfl:     omeprazole (PRILOSEC) 20 MG capsule, Take 20 mg by mouth daily.   , Disp: , Rfl: ALLERGIES:   Allergies   Allergen Reactions    Darvon [Propoxyphene] Nausea And Vomiting and Other (See Comments)     Severe headaches    Morphine Nausea And Vomiting and Other (See Comments)     Severe headaches       FAMILY HISTORY:       Problem Relation Age of Onset    Ovarian Cancer Mother     Other Daughter         Wegener's disease    Heart Attack Father     Breast Cancer Maternal Grandmother          SOCIAL HISTORY:   Social History     Socioeconomic History    Marital status:      Spouse name: Not on file    Number of children: Not on file    Years of education: Not on file    Highest education level: Not on file   Occupational History    Not on file   Social Needs    Financial resource strain: Not on file    Food insecurity     Worry: Not on file     Inability: Not on file    Transportation needs     Medical: Not on file     Non-medical: Not on file   Tobacco Use    Smoking status: Former Smoker     Packs/day: 0.50     Years: 3.00     Pack years: 1.50     Quit date:      Years since quittin.3    Smokeless tobacco: Never Used   Substance and Sexual Activity    Alcohol use: Yes     Comment: 1-2 per week    Drug use: No    Sexual activity: Not on file   Lifestyle    Physical activity     Days per week: Not on file     Minutes per session: Not on file    Stress: Not on file   Relationships    Social connections     Talks on phone: Not on file     Gets together: Not on file     Attends Buddhism service: Not on file     Active member of club or organization: Not on file     Attends meetings of clubs or organizations: Not on file     Relationship status: Not on file    Intimate partner violence     Fear of current or ex partner: Not on file     Emotionally abused: Not on file     Physically abused: Not on file     Forced sexual activity: Not on file   Other Topics Concern    Not on file   Social History Narrative    Not on file       REVIEW OF SYSTEMS: A 12-point review of systemswas obtained and pertinent positives and negatives were enumerated above in the history of present illness. All other reviewed systems / symptoms were negative. Review of Systems   Constitutional: Positive for fatigue. Negative for appetite change and unexpected weight change. HENT: Negative for trouble swallowing. Eyes: Negative for visual disturbance (glasses). Respiratory: Negative for cough, choking and wheezing. Cardiovascular: Negative for chest pain, palpitations and leg swelling. Gastrointestinal: Positive for diarrhea and nausea. Negative for abdominal distention, abdominal pain, anal bleeding, blood in stool, constipation, rectal pain and vomiting. Genitourinary: Negative for difficulty urinating. Musculoskeletal: Negative for back pain and joint swelling. Allergic/Immunologic: Negative for environmental allergies and food allergies. Neurological: Negative for dizziness, weakness, light-headedness, numbness and headaches. Hematological: Bruises/bleeds easily. Psychiatric/Behavioral: Negative for sleep disturbance. The patient is not nervous/anxious.             LABORATORY DATA: Reviewed  Lab Results   Component Value Date    WBC 8.4 01/26/2021    HGB 13.6 01/26/2021    HCT 41.7 01/26/2021    MCV 90.8 01/26/2021     01/26/2021     01/26/2021    K 4.2 01/26/2021     01/26/2021    CO2 23 01/26/2021    BUN 11 01/26/2021    CREATININE 0.74 01/26/2021    LABPROT 6.9 06/05/2012    LABALBU 4.2 07/01/2020    BILITOT 0.38 07/01/2020    ALKPHOS 86 07/01/2020    AST 18 07/01/2020    ALT 28 07/01/2020         Lab Results   Component Value Date    RBC 4.59 01/26/2021    HGB 13.6 01/26/2021    MCV 90.8 01/26/2021    MCH 29.7 01/26/2021    MCHC 32.7 01/26/2021    RDW 14.4 01/26/2021    MPV 8.2 01/26/2021    BASOPCT 0 01/26/2021    LYMPHSABS 2.90 01/26/2021    MONOSABS 0.80 01/26/2021    NEUTROABS 4.70 01/26/2021    EOSABS 0.00 01/26/2021    BASOSABS 0.00 01/26/2021         DIAGNOSTIC TESTING:     Xr Leg Length Evaluation    Result Date: 4/4/2021  EXAMINATION: BONE LENGTH STUDIES, 4/2/2021 11:44 am COMPARISON: 09/02/2014 HISTORY: ORDERING SYSTEM PROVIDED HISTORY:  Leg length discrepancy. TECHNOLOGIST PROVIDED HISTORY: Reason for Exam:  Patient c/o leg pain and states left leg is shorter than the other. FINDINGS: Right femur length = 46 cm. Right tibia length = 36.5 cm. Right total limb length = 81 cm. Left femur length = 43 cm. Left tibia length = 35.5 cm. Left total limb length = 79 cm. There is a right total hip prosthesis. There is a left total knee prosthesis. Hardware is seen in the lower lumbar spine. No acute displaced fractures are seen. Total limb length discrepancy = 2 cm, with the right leg measuring longer than the left. PHYSICAL EXAMINATION: Vital signs reviewed per the nursing documentation. /81   Pulse 87   Temp 97.2 °F (36.2 °C) (Temporal)   Resp 12   Ht 5' 3\" (1.6 m)   Wt 149 lb 6.4 oz (67.8 kg)   SpO2 100%   Breastfeeding No   BMI 26.47 kg/m²   Body mass index is 26.47 kg/m². Physical Exam  Vitals signs and nursing note reviewed. Constitutional:       General: She is not in acute distress. Appearance: She is well-developed. She is not diaphoretic. HENT:      Head: Normocephalic. Mouth/Throat:      Pharynx: No oropharyngeal exudate. Eyes:      General: No scleral icterus. Pupils: Pupils are equal, round, and reactive to light. Neck:      Musculoskeletal: Normal range of motion and neck supple. Thyroid: No thyromegaly. Vascular: No JVD. Trachea: No tracheal deviation. Cardiovascular:      Rate and Rhythm: Normal rate and regular rhythm. Heart sounds: Normal heart sounds. No murmur. Pulmonary:      Effort: Pulmonary effort is normal. No respiratory distress. Breath sounds: Normal breath sounds. No wheezing.    Abdominal:      General: Bowel sounds are normal. There is no distension. Palpations: Abdomen is soft. Tenderness: There is no abdominal tenderness. There is no guarding or rebound. Comments: No ascites   Musculoskeletal: Normal range of motion. Skin:     General: Skin is warm. Coloration: Skin is not pale. Findings: No erythema or rash. Comments: She is not diaphoretic   Neurological:      Mental Status: She is alert and oriented to person, place, and time. Deep Tendon Reflexes: Reflexes are normal and symmetric. Psychiatric:         Behavior: Behavior normal.         Thought Content: Thought content normal.         Judgment: Judgment normal.           IMPRESSION: Ms. Tung Odom is a 66 y.o. female with      Diagnosis Orders   1. Adenomatous polyps  COLONOSCOPY W/ OR W/O BIOPSY   2. Gastroesophageal reflux disease without esophagitis  EGD   3. Liver hemangioma     4. Diverticulosis           Diet/life style/natural hx /complication of the dx were all explained in details   Past medical, past surgical, social history, psychiatric history, medications or allergies, all reviewed and  updated    Thank you for allowing me to participate in the care of Ms. Reina. For any further questions please do not hesitate to contact me. I have reviewed and agree with the ROS entered by the MA/RN. Note is dictated utilizing voice recognition software. Unfortunately this leads to occasional typographical errors. Please contact our office if you have any questions.       Cristobal Heimlich, MD  [facility]  O: #[phone]

## 2021-06-23 RX ORDER — POLYETHYLENE GLYCOL 3350 17 G/17G
POWDER, FOR SOLUTION ORAL
Qty: 238 G | Refills: 0 | Status: SHIPPED | OUTPATIENT
Start: 2021-06-23 | End: 2021-10-05 | Stop reason: CLARIF

## 2021-06-23 NOTE — TELEPHONE ENCOUNTER
Writer returned pt's phone call & spoke to pt over the phone in regards to scheduling EGD/colon procs. Per pt, she is not on any blood thinners, has no kidney disease and has been vaccinated for Covid-19 virus. Pt instructed to bring proof of Covid-19 vaccinations on day of procedure. Pt is sched talya/ Naun at [address] [place] [address] 7/29/21 @ 9:15a proc time, 7:15am arrival time. Dulcolax, Miralax and Mag Citrate orders will be sent to [facility] on [place], [place]. Bowel prep instructions given to pt over the phone and hard copy mailed to pt's home address. Pt instructed she will need a . PAT phone call is sched 7/15 @ 10:45am.  Pt voices her understanding.

## 2021-06-23 NOTE — TELEPHONE ENCOUNTER
Writer returned pt's phone call and left msg on pt's vm asking pt to call back sched EGD/colon procs.

## 2021-07-15 ENCOUNTER — HOSPITAL ENCOUNTER (OUTPATIENT)
Dept: PREADMISSION TESTING | Age: 71
Discharge: HOME | End: 2021-07-19
Payer: MEDICARE

## 2021-07-15 VITALS — HEIGHT: 63 IN | WEIGHT: 150 LBS | BODY MASS INDEX: 26.58 KG/M2

## 2021-07-15 NOTE — PROGRESS NOTES
Pre-op Instructions For Out-Patient Surgery    Medication Instructions:  · Please stop herbs and any supplements now (includes vitamins and minerals). · Please contact your surgeon and prescribing physician for pre-op instructions for any blood thinners. Mobic     · If you have inhalers/aerosol treatments at home, please use them the morning of your surgery and bring the inhalers with you to the hospital.    · Please take the following medications the morning of your surgery with a sip of water:    None     Surgery Instructions:  1. After midnight before surgery:  Do not eat or drink anything, including water, mints, gum, and hard candy. You may brush your teeth without swallowing. No smoking, chewing tobacco, or street drugs. 2. Please shower or bathe before surgery. 3. Please do not wear any cologne, lotion, powder, deodorant, jewelry, piercings, perfume, makeup, nail polish, hair accessories, or hair spray on the day of surgery. Wear loose comfortable clothing. 4. Leave your valuables at home. Bring a storage case for any glasses/contacts. 5. An adult who is responsible for you MUST drive you home and should be with you for the first 24 hours after surgery. 6. If having out-patient knee and foot surgeries, please arrange for planned crutches, walker, or wheelchair before arriving to the hospital.    The Day of Surgery:  · Arrive at [address] [place] [address] Surgery Entrance at the time directed by your surgeon and check in at the desk. · If you have a living will or healthcare power of , please bring a copy. · You will be taken to the pre-op holding area where you will be prepared for surgery. A physical assessment will be performed by a nurse practitioner or house officer.   Your IV will be started and you will meet your anesthesiologist.    · When you go to surgery, your family will be directed to the surgical waiting room, where the doctor should speak with them after your surgery. · After surgery, you will be taken to the recovery room then when you are awake and stable you will go to the short stay unit for preparation to be discharged. Only your one designated person is allowed to come to short stay for your discharge. · If you use a Bi-PAP or C-PAP machine, please bring it with you and leave it in the car in case it is needed in recovery room. Weston Montgomery will bring her Covid-19 vaccination card with her for her procedure with Dr. Munir Leal on 7/29/21. Instructions read to Zaina and understanding verbalized.

## 2021-07-28 ENCOUNTER — ANESTHESIA EVENT (OUTPATIENT)
Dept: ENDOSCOPY | Age: 71
End: 2021-07-28
Payer: MEDICARE

## 2021-07-28 ENCOUNTER — OFFICE VISIT (OUTPATIENT)
Dept: ORTHOPEDIC SURGERY | Age: 71
End: 2021-07-28
Payer: MEDICARE

## 2021-07-28 DIAGNOSIS — Z96.642 HISTORY OF LEFT HIP REPLACEMENT: Primary | ICD-10-CM

## 2021-07-28 PROCEDURE — G8400 PT W/DXA NO RESULTS DOC: HCPCS | Performed by: ORTHOPAEDIC SURGERY

## 2021-07-28 PROCEDURE — 99213 OFFICE O/P EST LOW 20 MIN: CPT | Performed by: ORTHOPAEDIC SURGERY

## 2021-07-28 PROCEDURE — 1123F ACP DISCUSS/DSCN MKR DOCD: CPT | Performed by: ORTHOPAEDIC SURGERY

## 2021-07-28 PROCEDURE — 4040F PNEUMOC VAC/ADMIN/RCVD: CPT | Performed by: ORTHOPAEDIC SURGERY

## 2021-07-28 PROCEDURE — G8417 CALC BMI ABV UP PARAM F/U: HCPCS | Performed by: ORTHOPAEDIC SURGERY

## 2021-07-28 PROCEDURE — G8428 CUR MEDS NOT DOCUMENT: HCPCS | Performed by: ORTHOPAEDIC SURGERY

## 2021-07-28 PROCEDURE — 1036F TOBACCO NON-USER: CPT | Performed by: ORTHOPAEDIC SURGERY

## 2021-07-28 PROCEDURE — 1090F PRES/ABSN URINE INCON ASSESS: CPT | Performed by: ORTHOPAEDIC SURGERY

## 2021-07-28 PROCEDURE — 3017F COLORECTAL CA SCREEN DOC REV: CPT | Performed by: ORTHOPAEDIC SURGERY

## 2021-07-28 RX ORDER — SODIUM CHLORIDE 9 MG/ML
25 INJECTION, SOLUTION INTRAVENOUS PRN
Status: CANCELLED | OUTPATIENT
Start: 2021-07-28

## 2021-07-28 NOTE — PROGRESS NOTES
Gerda Monge M.D.            [address] [place] [address], [address] [facility],[address], [address] [facility]           Dept Phone: [phone]           Dept Fax:  [phone]           [place]          Dept Phone: [phone]           Dept Fax:  [phone]      Chief Compliant:  Chief Complaint   Patient presents with    Pain     Rt ASI 2/9/21        History of Present Illness: This is a 66 y.o. female who presents to the clinic today for evaluation / follow up of status post a right total hip on 2/9/2021. Zaina overall says her hip feels great. She did have a lift made for her contralateral side and she had a pretty significant leg discrepancy preoperatively. She states that she has no right hip pain at all. She is very happy on feels she is getting around she does admit that she is not doing much in the way of exercises however. Review of Systems   Constitutional: Negative for fever, chills, sweats. Eyes: Negative for changes in vision, or pain. HENT: Negative for ear ache, epistaxis, or sore throat. Respiratory/Cardio: Negative for Chest pain, palpitations, SOB, or cough. Gastrointestinal: Negative for abdominal pain, N/V/D. Genitourinary: Negative for dysuria, frequency, urgency, or hematuria. Neurological: Negative for headache, numbness, or weakness. Integumentary: Negative for rash, itching, laceration, or abrasion. Musculoskeletal: Positive for Pain (Rt ASI 2/9/21)       Physical Exam:  Constitutional: Patient is oriented to person, place, and time. Patient appears well-developed and well nourished. HENT: Negative otherwise noted  Head: Normocephalic and Atraumatic  Nose: Normal  Eyes: Conjunctivae and EOM are normal  Neck: Normal range of motion Neck supple. Respiratory/Cardio: Effort normal. No respiratory distress.   Musculoskeletal: Examination of notes that her right hip hip incision is pristine. She has a little bit induration and some mild meralgia but otherwise doing well but she has no pain whatsoever on flexion internal ex rotation and negative Stinchfield's. Neurological: Patient is alert and oriented to person, place, and time. Normal strenght. No sensory deficit. Skin: Skin is warm and dry  Psychiatric: Behavior is normal. Thought content normal.  Nursing note and vitals reviewed. Labs and Imaging:     XR taken today:  No results found. No orders of the defined types were placed in this encounter. Assessment and Plan:  1. History of left hip replacement            This is a 66 y.o. female who presents to the clinic today for evaluation / follow up of status post right total hip ASI 2/9/2021 doing very well. Past History:    Current Outpatient Medications:     magnesium citrate solution, Take 296 mLs by mouth once for 1 dose, Disp: 296 mL, Rfl: 0    polyethylene glycol (GLYCOLAX) 17 GM/SCOOP powder, Use as directed by following your patient instructions given by office. , Disp: 238 g, Rfl: 0    bisacodyl (DULCOLAX) 5 MG EC tablet, TAKE 4 TABS AT 10 AM THE DAY PRIOR TO COLONOSCOPY, Disp: 4 tablet, Rfl: 0    meloxicam (MOBIC) 15 MG tablet, Take 15 mg by mouth daily, Disp: , Rfl:     atorvastatin (LIPITOR) 20 MG tablet, Take 1 tablet by mouth daily, Disp: 90 tablet, Rfl: 3    citalopram (CELEXA) 20 MG tablet, TAKE ONE TABLET BY MOUTH ONCE DAILY, Disp: 90 tablet, Rfl: 3    pramipexole (MIRAPEX) 0.5 MG tablet, TAKE ONE TABLET BY MOUTH ONCE NIGHTLY, Disp: 90 tablet, Rfl: 3    Magnesium 500 MG CAPS, Take 1 capsule by mouth daily, Disp: , Rfl:     melatonin 5 MG TABS tablet, Take 10 mg by mouth nightly as needed, Disp: , Rfl:     SUMAtriptan (IMITREX) 50 MG tablet, Take at onset of headache; may repeat in 2 hours; no more than 2 in 24 hours. , Disp: 9 tablet, Rfl: 11    CALCIUM CITRATE PO, Take 1,200 mg by mouth daily calciu, Disp: , Rfl:     Multiple Vitamins-Minerals (THERAPEUTIC MULTIVITAMIN-MINERALS) tablet, Take 1 tablet by mouth daily. , Disp: , Rfl:     omeprazole (PRILOSEC) 20 MG capsule, Take 20 mg by mouth daily. , Disp: , Rfl:   Allergies   Allergen Reactions    Darvon [Propoxyphene] Nausea And Vomiting and Other (See Comments)     Severe headaches    Morphine Nausea And Vomiting and Other (See Comments)     Severe headaches    Augmentin [Amoxicillin-Pot Clavulanate] Diarrhea and Nausea And Vomiting     Social History     Socioeconomic History    Marital status:      Spouse name: Not on file    Number of children: Not on file    Years of education: Not on file    Highest education level: Not on file   Occupational History    Not on file   Tobacco Use    Smoking status: Former Smoker     Packs/day: 0.50     Years: 3.00     Pack years: 1.50     Quit date:      Years since quittin.5    Smokeless tobacco: Never Used   Vaping Use    Vaping Use: Never used   Substance and Sexual Activity    Alcohol use: Yes     Comment: 1-2 per week    Drug use: No    Sexual activity: Not on file   Other Topics Concern    Not on file   Social History Narrative    Not on file     Social Determinants of Health     Financial Resource Strain:     Difficulty of Paying Living Expenses:    Food Insecurity:     Worried About Running Out of Food in the Last Year:     Ran Out of Food in the Last Year:    Transportation Needs:     Lack of Transportation (Medical):      Lack of Transportation (Non-Medical):    Physical Activity:     Days of Exercise per Week:     Minutes of Exercise per Session:    Stress:     Feeling of Stress :    Social Connections:     Frequency of Communication with Friends and Family:     Frequency of Social Gatherings with Friends and Family:     Attends Mormon Services:     Active Member of Clubs or Organizations:     Attends Club or Organization Meetings:     Marital Status:    Intimate Partner Violence:     Fear of Current or Ex-Partner:     Emotionally Abused:     Physically Abused:     Sexually Abused:      Past Medical History:   Diagnosis Date    Arthritis     Bone spur of left foot     Breast cancer ([address] 1998    left with reconstruction, no chemo or radiation    Colon polyp     Degenerative joint disease of right hip     Depression     Diverticulosis     Elevated liver enzymes     GERD (gastroesophageal reflux disease)     Hemorrhoids     Hyperlipidemia     Liver hemangioma     Migraine headache     Osteopenia     Overweight (BMI 25.0-29.9) 05/11/2015    Restless legs syndrome (RLS)     on Mirapex    Tubular adenoma      Past Surgical History:   Procedure Laterality Date    BREAST BIOPSY Left     cancer found    BREAST RECONSTRUCTION Left     using abdominal tissue    CATARACT REMOVAL WITH IMPLANT Bilateral     COLONOSCOPY      COLONOSCOPY  2 10 15    DIVERTICULOSIS AND HEMORRHOIDS , TUBULAR ADENOMA     COLONOSCOPY N/A 2/21/2018    COLONOSCOPY WITH BIOPSY performed by Sanam Stern MD at [address] [place] [address] Rd, COLON, DIAGNOSTIC      EYE SURGERY      MASTECTOMY      MASTECTOMY Left     remainder of breast was removed because the margins were not clear on the partial mastectomy    MASTECTOMY, PARTIAL Left     for cancer    CT REMOVAL OF HEEL SPUR Left 11/5/2018    EXOSTECTOMY DORSAL FOOT performed by Edith [place] DPM at [address]      L3,4,5, fusion    TOTAL HIP ARTHROPLASTY Right 2/9/2021    HIP TOTAL ARTHROPLASTY ASI performed by Javier Herrera MD at [address] [place] [address] Rd Left 5/21/2019    KNEE TOTAL ARTHROPLASTY performed by Javier Herrera MD at [facility]       Family History   Problem Relation Age of Onset    Ovarian Cancer Mother     Other Daughter         Wegener's disease    Heart Attack Father     Breast Cancer Maternal Grandmother    Plan  Patient is

## 2021-07-29 ENCOUNTER — HOSPITAL ENCOUNTER (OUTPATIENT)
Age: 71
Setting detail: OUTPATIENT SURGERY
Discharge: HOME | End: 2021-07-29
Attending: INTERNAL MEDICINE | Admitting: INTERNAL MEDICINE
Payer: MEDICARE

## 2021-07-29 ENCOUNTER — ANESTHESIA (OUTPATIENT)
Dept: ENDOSCOPY | Age: 71
End: 2021-07-29
Payer: MEDICARE

## 2021-07-29 VITALS
BODY MASS INDEX: 26.58 KG/M2 | WEIGHT: 150 LBS | TEMPERATURE: 97.8 F | HEART RATE: 80 BPM | HEIGHT: 63 IN | OXYGEN SATURATION: 96 % | SYSTOLIC BLOOD PRESSURE: 131 MMHG | RESPIRATION RATE: 19 BRPM | DIASTOLIC BLOOD PRESSURE: 68 MMHG

## 2021-07-29 VITALS — DIASTOLIC BLOOD PRESSURE: 60 MMHG | TEMPERATURE: 98.6 F | OXYGEN SATURATION: 100 % | SYSTOLIC BLOOD PRESSURE: 122 MMHG

## 2021-07-29 PROCEDURE — 88305 TISSUE EXAM BY PATHOLOGIST: CPT

## 2021-07-29 PROCEDURE — 7100000000 HC PACU RECOVERY - FIRST 15 MIN: Performed by: INTERNAL MEDICINE

## 2021-07-29 PROCEDURE — 2709999900 HC NON-CHARGEABLE SUPPLY: Performed by: INTERNAL MEDICINE

## 2021-07-29 PROCEDURE — 3700000000 HC ANESTHESIA ATTENDED CARE: Performed by: INTERNAL MEDICINE

## 2021-07-29 PROCEDURE — 3609027000 HC COLONOSCOPY: Performed by: INTERNAL MEDICINE

## 2021-07-29 PROCEDURE — 3700000001 HC ADD 15 MINUTES (ANESTHESIA): Performed by: INTERNAL MEDICINE

## 2021-07-29 PROCEDURE — G0121 COLON CA SCRN NOT HI RSK IND: HCPCS | Performed by: INTERNAL MEDICINE

## 2021-07-29 PROCEDURE — 7100000001 HC PACU RECOVERY - ADDTL 15 MIN: Performed by: INTERNAL MEDICINE

## 2021-07-29 PROCEDURE — 6360000002 HC RX W HCPCS: Performed by: NURSE ANESTHETIST, CERTIFIED REGISTERED

## 2021-07-29 PROCEDURE — 3609012400 HC EGD TRANSORAL BIOPSY SINGLE/MULTIPLE: Performed by: INTERNAL MEDICINE

## 2021-07-29 PROCEDURE — 43239 EGD BIOPSY SINGLE/MULTIPLE: CPT | Performed by: INTERNAL MEDICINE

## 2021-07-29 PROCEDURE — 2500000003 HC RX 250 WO HCPCS: Performed by: NURSE ANESTHETIST, CERTIFIED REGISTERED

## 2021-07-29 PROCEDURE — 2580000003 HC RX 258: Performed by: ANESTHESIOLOGY

## 2021-07-29 RX ORDER — SODIUM CHLORIDE 0.9 % (FLUSH) 0.9 %
10 SYRINGE (ML) INJECTION EVERY 12 HOURS SCHEDULED
Status: DISCONTINUED | OUTPATIENT
Start: 2021-07-29 | End: 2021-07-29 | Stop reason: HOSPADM

## 2021-07-29 RX ORDER — SODIUM CHLORIDE, SODIUM LACTATE, POTASSIUM CHLORIDE, CALCIUM CHLORIDE 600; 310; 30; 20 MG/100ML; MG/100ML; MG/100ML; MG/100ML
INJECTION, SOLUTION INTRAVENOUS CONTINUOUS
Status: DISCONTINUED | OUTPATIENT
Start: 2021-07-29 | End: 2021-07-29 | Stop reason: HOSPADM

## 2021-07-29 RX ORDER — LIDOCAINE HYDROCHLORIDE 10 MG/ML
1 INJECTION, SOLUTION EPIDURAL; INFILTRATION; INTRACAUDAL; PERINEURAL
Status: DISCONTINUED | OUTPATIENT
Start: 2021-07-29 | End: 2021-07-29 | Stop reason: HOSPADM

## 2021-07-29 RX ORDER — LIDOCAINE HYDROCHLORIDE 10 MG/ML
INJECTION, SOLUTION EPIDURAL; INFILTRATION; INTRACAUDAL; PERINEURAL PRN
Status: DISCONTINUED | OUTPATIENT
Start: 2021-07-29 | End: 2021-07-29 | Stop reason: SDUPTHER

## 2021-07-29 RX ORDER — SODIUM CHLORIDE 0.9 % (FLUSH) 0.9 %
10 SYRINGE (ML) INJECTION PRN
Status: DISCONTINUED | OUTPATIENT
Start: 2021-07-29 | End: 2021-07-29 | Stop reason: HOSPADM

## 2021-07-29 RX ORDER — PROPOFOL 10 MG/ML
INJECTION, EMULSION INTRAVENOUS PRN
Status: DISCONTINUED | OUTPATIENT
Start: 2021-07-29 | End: 2021-07-29 | Stop reason: SDUPTHER

## 2021-07-29 RX ADMIN — LIDOCAINE HYDROCHLORIDE 50 MG: 10 INJECTION, SOLUTION EPIDURAL; INFILTRATION; INTRACAUDAL; PERINEURAL at 09:05

## 2021-07-29 RX ADMIN — PROPOFOL 320 MG: 10 INJECTION, EMULSION INTRAVENOUS at 09:05

## 2021-07-29 RX ADMIN — SODIUM CHLORIDE, POTASSIUM CHLORIDE, SODIUM LACTATE AND CALCIUM CHLORIDE: 600; 310; 30; 20 INJECTION, SOLUTION INTRAVENOUS at 07:51

## 2021-07-29 ASSESSMENT — PULMONARY FUNCTION TESTS
PIF_VALUE: 1

## 2021-07-29 ASSESSMENT — PAIN SCALES - GENERAL
PAINLEVEL_OUTOF10: 0

## 2021-07-29 ASSESSMENT — PAIN - FUNCTIONAL ASSESSMENT: PAIN_FUNCTIONAL_ASSESSMENT: 0-10

## 2021-07-29 ASSESSMENT — ENCOUNTER SYMPTOMS: STRIDOR: 0

## 2021-07-29 NOTE — ANESTHESIA PRE PROCEDURE
Department of Anesthesiology  Preprocedure Note       Name:  Royal Drummond   Age:  66 y.o.  :  1950                                          MRN:  [id number]         Date:  2021      Surgeon: Cesia Easton):  Lucrecia Charlton MD    Procedure: Procedure(s):  EGD ESOPHAGOGASTRODUODENOSCOPY  COLONOSCOPY DIAGNOSTIC    Medications prior to admission:   Prior to Admission medications    Medication Sig Start Date End Date Taking? Authorizing Provider   polyethylene glycol (GLYCOLAX) 17 GM/SCOOP powder Use as directed by following your patient instructions given by office. 21  Yes Marissa Magallon MD   bisacodyl (DULCOLAX) 5 MG EC tablet TAKE 4 TABS AT 10 AM THE DAY PRIOR TO COLONOSCOPY 21  Yes Marissa Magallon MD   atorvastatin (LIPITOR) 20 MG tablet Take 1 tablet by mouth daily 21  Yes Nela Dugan MD   citalopram (CELEXA) 20 MG tablet TAKE ONE TABLET BY MOUTH ONCE DAILY 21  Yes Nela Dugan MD   pramipexole (MIRAPEX) 0.5 MG tablet TAKE ONE TABLET BY MOUTH ONCE NIGHTLY 21  Yes Nela Dugan MD   Magnesium 500 MG CAPS Take 1 capsule by mouth daily   Yes Historical Provider, MD   melatonin 5 MG TABS tablet Take 10 mg by mouth nightly as needed   Yes Historical Provider, MD   SUMAtriptan (IMITREX) 50 MG tablet Take at onset of headache; may repeat in 2 hours; no more than 2 in 24 hours. 10/8/19  Yes Nela Dugan MD   CALCIUM CITRATE PO Take 1,200 mg by mouth daily calciu   Yes Historical Provider, MD   Multiple Vitamins-Minerals (THERAPEUTIC MULTIVITAMIN-MINERALS) tablet Take 1 tablet by mouth daily. Yes Historical Provider, MD   omeprazole (PRILOSEC) 20 MG capsule Take 20 mg by mouth daily.      Yes Historical Provider, MD   magnesium citrate solution Take 296 mLs by mouth once for 1 dose 6/23/21 7/15/21  Marissa Magallon MD   meloxicam (MOBIC) 15 MG tablet Take 15 mg by mouth daily    Historical Provider, MD       Current medications:    Current Facility-Administered Medications   Medication Dose Route Frequency Provider Last Rate Last Admin    lactated ringers infusion   Intravenous Continuous Concepción Sears  mL/hr at 07/29/21 0751 New Bag at 07/29/21 0751    sodium chloride flush 0.9 % injection 10 mL  10 mL Intravenous 2 times per day Concepción Sears MD        sodium chloride flush 0.9 % injection 10 mL  10 mL Intravenous PRN Concepción Sears MD        lidocaine PF 1 % injection 1 mL  1 mL Intradermal Once PRN Concepción Sears MD           Allergies:     Allergies   Allergen Reactions    Darvon [Propoxyphene] Nausea And Vomiting and Other (See Comments)     Severe headaches    Morphine Nausea And Vomiting and Other (See Comments)     Severe headaches    Augmentin [Amoxicillin-Pot Clavulanate] Diarrhea and Nausea And Vomiting       Problem List:    Patient Active Problem List   Diagnosis Code    Diverticulosis K57.90    Colon polyp K63.5    Hyperlipidemia with target LDL less than 130 E78.5    Migraine headache G43.909    BMI 25.0-25.9,adult Z68.25    Liver hemangioma D18.03    Elevated liver enzymes R74.8    Migraine without aura and without status migrainosus, not intractable G43.009    Hemorrhoids K64.9    Tubular adenoma D36.9    Diverticulosis of intestine without bleeding K57.90    Gastroesophageal reflux disease without esophagitis K21.9    Snoring R06.83    Exercise counseling Z71.82    Restless leg syndrome, uncontrolled G25.81    Diarrhea R19.7    Bone spur of toe of left foot M77.52    Primary osteoarthritis of left foot M19.072    Pain, foot, left, chronic M79.672, G89.29    Primary osteoarthritis of left knee M17.12    Degenerative joint disease of right hip M16.11    Primary osteoarthritis of right hip M16.11       Past Medical History:        Diagnosis Date    Arthritis     Bone spur of left foot     Breast cancer ([facility] [address] 1998    left with reconstruction, no chemo or radiation    Colon polyp     Degenerative joint disease of right hip  Depression     Diverticulosis     Elevated liver enzymes     GERD (gastroesophageal reflux disease)     Hemorrhoids     Hyperlipidemia     Liver hemangioma     Migraine headache     Osteopenia     Overweight (BMI 25.0-29.9) 2015    Restless legs syndrome (RLS)     on Mirapex    Tubular adenoma        Past Surgical History:        Procedure Laterality Date    BREAST BIOPSY Left     cancer found    BREAST RECONSTRUCTION Left     using abdominal tissue    CATARACT REMOVAL WITH IMPLANT Bilateral     COLONOSCOPY      COLONOSCOPY  2 10 15    DIVERTICULOSIS AND HEMORRHOIDS , TUBULAR ADENOMA     COLONOSCOPY N/A 2018    COLONOSCOPY WITH BIOPSY performed by Nithin Niño MD at [address] [facility], COLON, DIAGNOSTIC      EYE SURGERY      MASTECTOMY      MASTECTOMY Left     remainder of breast was removed because the margins were not clear on the partial mastectomy    MASTECTOMY, PARTIAL Left     for cancer    [place] REMOVAL OF HEEL SPUR Left 2018    EXOSTECTOMY DORSAL FOOT performed by Yisel Church DPM at [address]      L3,4,5, fusion   [address] Right 2021    HIP TOTAL ARTHROPLASTY ASI performed by Candance Silvius, MD at [address] Left 2019    KNEE TOTAL ARTHROPLASTY performed by Candance Silvius, MD at [address] [facility]         Social History:    Social History     Tobacco Use    Smoking status: Former Smoker     Packs/day: 0.50     Years: 3.00     Pack years: 1.50     Quit date:      Years since quittin.5    Smokeless tobacco: Never Used   Substance Use Topics    Alcohol use: Yes     Comment: 1-2 per week                                Counseling given: Not Answered      Vital Signs (Current):   Vitals:    21 0731   BP: (!) 169/86   Pulse: 106   Resp: 18   Temp: 96.9 °F (36.1 °C)   TempSrc: Infrared   SpO2: 97%   Weight: 150 lb (68 kg)   Height: 5' 3\" (1.6 m) BP Readings from Last 3 Encounters:   07/29/21 (!) 169/86   07/12/21 130/70   04/27/21 139/81       NPO Status: Time of last liquid consumption: 2000                        Time of last solid consumption: 1800                        Date of last liquid consumption: 07/28/21                        Date of last solid food consumption: 07/26/21    BMI:   Wt Readings from Last 3 Encounters:   07/29/21 150 lb (68 kg)   07/15/21 150 lb (68 kg)   07/12/21 150 lb 4 oz (68.2 kg)     Body mass index is 26.57 kg/m². CBC:   Lab Results   Component Value Date    WBC 8.4 01/26/2021    RBC 4.59 01/26/2021    RBC 4.46 06/05/2012    HGB 13.6 01/26/2021    HCT 41.7 01/26/2021    MCV 90.8 01/26/2021    RDW 14.4 01/26/2021     01/26/2021     06/05/2012       CMP:   Lab Results   Component Value Date     01/26/2021    K 4.2 01/26/2021     01/26/2021    CO2 23 01/26/2021    BUN 11 01/26/2021    CREATININE 0.74 01/26/2021    GFRAA >60 01/26/2021    LABGLOM >60 01/26/2021    GLUCOSE 106 01/26/2021    GLUCOSE 122 06/05/2012    PROT 7.1 07/01/2020    CALCIUM 9.3 01/26/2021    BILITOT 0.38 07/01/2020    ALKPHOS 86 07/01/2020    AST 18 07/01/2020    ALT 28 07/01/2020       POC Tests: No results for input(s): POCGLU, POCNA, POCK, POCCL, POCBUN, POCHEMO, POCHCT in the last 72 hours.     Coags: No results found for: PROTIME, INR, APTT    HCG (If Applicable): No results found for: PREGTESTUR, PREGSERUM, HCG, HCGQUANT     ABGs: No results found for: PHART, PO2ART, LPA6QHA, SWR7GXJ, BEART, O6IKPHED     Type & Screen (If Applicable):  No results found for: LABABO, LABRH    Drug/Infectious Status (If Applicable):  Lab Results   Component Value Date    HEPCAB NONREACTIVE 02/03/2015       COVID-19 Screening (If Applicable):   Lab Results   Component Value Date    COVID19 Not Detected 02/05/2021           Anesthesia Evaluation  Patient summary reviewed and Nursing notes reviewed no history of anesthetic complications:   Airway: Mallampati: III  TM distance: >3 FB   Neck ROM: full  Mouth opening: > = 3 FB Dental:    (+) partials      Pulmonary:Negative Pulmonary ROS breath sounds clear to auscultation      (-) rhonchi, wheezes, rales, stridor and no decreased breath sounds                           Cardiovascular:        (-) murmur, weak pulses,  friction rub, systolic click, carotid bruit,  JVD and peripheral edema    ECG reviewed  Rhythm: regular  Rate: normal                    Neuro/Psych:   (+) headaches:, psychiatric history:            GI/Hepatic/Renal:   (+) GERD:, bowel prep,           Endo/Other: Negative Endo/Other ROS                    Abdominal:             Vascular: negative vascular ROS. Other Findings:             Anesthesia Plan      general     ASA 2       Induction: intravenous. Anesthetic plan and risks discussed with patient. Plan discussed with CRNA.                   Bin Mackay MD   7/29/2021

## 2021-07-29 NOTE — H&P
HISTORY and [id number]       NAME:  Nayla Penaloza  MRN: [id number]   YOB: 1950   Date: 7/29/2021   Age: 66 y.o. Gender: female       COMPLAINT AND PRESENT HISTORY:   Nayla Penaloza is 66 y.o.,  female, will be having a Colonoscopy and EGD. Prior Colonoscopy and EGD  with polyp removed. Patient has hx of tubular Adenoma. Pt also has hx of Diverticulosis and hemorrhoids. Patient denies any  FH of Colon or esophogeal  Cancer. Patient reports no changes in bowel habits. No difficulty with bowel movements. Pt reports wiping blood,  bleeding,BRBPR stools. No melena stools. Pt has hx of hemorrhoids. Pt reports dysphagia and difficulty with food getting stuck. Pt has to resolve to drinking more fluids to assist with swallowing. Patient denies any Dysphagia. Pt has hx of GERD. Carl Ramos Pt is on a PPI  that is helping to control symptoms. Patient report nausea, usually early in the morning. Pt denies any.  vomiting. Occasionally  diarrhea No constipation. No heartburn or dysphagia. Any significant medical hx: HLD, left breast cancer-mastectomy    No chest pain, palpitations or diaphoresis. No recent URI, SOB, fever or chills. Any Anticoagulants or blood thinners: Mobic- stopped a week ago. Patient has been NPO since midnight. No blood thinners in the past 7 days. Patient denies any issues with Anesthesia    Patient reports taking partial bowel prep with clear output.        PAST MEDICAL HISTORY     Past Medical History:   Diagnosis Date    Arthritis     Bone spur of left foot     Breast cancer ([facility] [address] 1998    left with reconstruction, no chemo or radiation    Colon polyp     Degenerative joint disease of right hip     Depression     Diverticulosis     Elevated liver enzymes     GERD (gastroesophageal reflux disease)     Hemorrhoids     Hyperlipidemia     Liver hemangioma     Migraine headache     Osteopenia     Overweight (BMI 25.0-29.9) 2015    Restless legs syndrome (RLS)     on Mirapex    Tubular adenoma        SURGICAL HISTORY       Past Surgical History:   Procedure Laterality Date    BREAST BIOPSY Left     cancer found    BREAST RECONSTRUCTION Left     using abdominal tissue    CATARACT REMOVAL WITH IMPLANT Bilateral     COLONOSCOPY      COLONOSCOPY  2 10 15    DIVERTICULOSIS AND HEMORRHOIDS , TUBULAR ADENOMA     COLONOSCOPY N/A 2018    COLONOSCOPY WITH BIOPSY performed by Veronika Boyd MD at [address] [facility] COLON, DIAGNOSTIC      EYE SURGERY      MASTECTOMY      MASTECTOMY Left     remainder of breast was removed because the margins were not clear on the partial mastectomy    MASTECTOMY, PARTIAL Left     for cancer    [place] REMOVAL OF HEEL SPUR Left 2018    EXOSTECTOMY DORSAL FOOT performed by Eliot Pleitez DPM at [address]      L3,4,5, fusion    TOTAL HIP ARTHROPLASTY Right 2021    HIP TOTAL ARTHROPLASTY ASI performed by Darin Ordonez MD at [address] [facility] Left 2019    KNEE TOTAL ARTHROPLASTY performed by Darin Ordonez MD at [address]       Family History   Problem Relation Age of Onset    Ovarian Cancer Mother     Other Daughter         Wegener's disease    Heart Attack Father     Breast Cancer Maternal Grandmother        SOCIAL HISTORY       Social History     Socioeconomic History    Marital status:      Spouse name: Not on file    Number of children: Not on file    Years of education: Not on file    Highest education level: Not on file   Occupational History    Not on file   Tobacco Use    Smoking status: Former Smoker     Packs/day: 0.50     Years: 3.00     Pack years: 1.50     Quit date:      Years since quittin.5    Smokeless tobacco: Never Used   Vaping Use    Vaping Use: Never used   Substance and Sexual Activity    Alcohol use: Yes     Comment: 1-2 per week    Drug use: No    Sexual activity: Not on file   Other Topics Concern    Not on file   Social History Narrative    Not on file     Social Determinants of Health     Financial Resource Strain:     Difficulty of Paying Living Expenses:    Food Insecurity:     Worried About Running Out of Food in the Last Year:     [address] in the Last Year:    Transportation Needs:     Lack of Transportation (Medical):  Lack of Transportation (Non-Medical):    Physical Activity:     Days of Exercise per Week:     Minutes of Exercise per Session:    Stress:     Feeling of Stress :    Social Connections:     Frequency of Communication with Friends and Family:     Frequency of Social Gatherings with Friends and Family:     Attends [employer] Services:     Active Member of Clubs or Organizations:     Attends Club or Organization Meetings:     Marital Status:    Intimate Partner Violence:     Fear of Current or Ex-Partner:     Emotionally Abused:     Physically Abused:     Sexually Abused:            REVIEW OF SYSTEMS      Allergies   Allergen Reactions    Darvon [Propoxyphene] Nausea And Vomiting and Other (See Comments)     Severe headaches    Morphine Nausea And Vomiting and Other (See Comments)     Severe headaches    Augmentin [Amoxicillin-Pot Clavulanate] Diarrhea and Nausea And Vomiting       No current facility-administered medications on file prior to encounter.      Current Outpatient Medications on File Prior to Encounter   Medication Sig Dispense Refill    meloxicam (MOBIC) 15 MG tablet Take 15 mg by mouth daily      atorvastatin (LIPITOR) 20 MG tablet Take 1 tablet by mouth daily 90 tablet 3    citalopram (CELEXA) 20 MG tablet TAKE ONE TABLET BY MOUTH ONCE DAILY 90 tablet 3    pramipexole (MIRAPEX) 0.5 MG tablet TAKE ONE TABLET BY MOUTH ONCE NIGHTLY 90 tablet 3    Magnesium 500 MG CAPS Take 1 capsule by mouth daily      melatonin 5 MG TABS tablet Take 10 mg by mouth nightly as needed      SUMAtriptan (IMITREX) 50 MG tablet Take at onset of headache; may repeat in 2 hours; no more than 2 in 24 hours. 9 tablet 11    CALCIUM CITRATE PO Take 1,200 mg by mouth daily calciu      Multiple Vitamins-Minerals (THERAPEUTIC MULTIVITAMIN-MINERALS) tablet Take 1 tablet by mouth daily.  omeprazole (PRILOSEC) 20 MG capsule Take 20 mg by mouth daily. Negative except for what is mentioned in the HPI. GENERAL PHYSICAL EXAM     Vitals :   See vital signs in RN flow sheet. GENERAL APPEARANCE:   Rosmery Capps is 66 y.o.,  female, moderately obese, nourished, conscious, alert. Does not appear to be distress or pain at this time. SKIN:  Warm, dry, no cyanosis or jaundice. HEAD:  Normocephalic, atraumatic, no swelling or tenderness. EYES:  Pupils equal, reactive to light. EARS:  No discharge, no marked hearing loss. NOSE:  No rhinorrhea, epistaxis or septal deformity. THROAT:  Not congested. No ulceration bleeding or discharge. NECK:  No stiffness, trachea central.  No palpable masses or L.N.                 CHEST:  Symmetrical and equal on expansion. HEART:  RRR S1 > S2. No audible murmurs or gallops. LUNGS:  Equal on expansion, normal breath sounds. No adventitious sounds. ABDOMEN:  Obese. Soft on palpation. No dysphagia, No localized tenderness. No guarding or rigidity. No palpable hepatosplenomegaly. LYMPHATICS:  No palpable cervical lymphadenopathy. LOCOMOTOR, BACK AND SPINE:  No tenderness or deformities. EXTREMITIES:  Symmetrical, no pretibial edema. Navdeeps sign negative. No discoloration or ulcerations. NEUROLOGIC:  The patient is conscious, alert, oriented,Cranial nerve II-XII intact, taste and smell were not examined. No apparent focal sensory or motor deficits. PROVISIONAL DIAGNOSES / SURGERY:      GERD    ADENOMATOUS POLYPS    EGD ESOPHAGOGASTRODUODENOSCOPY     COLONOSCOPY DIAGNOSTIC      Patient Active Problem List    Diagnosis Date Noted    Primary osteoarthritis of right hip 02/09/2021    Degenerative joint disease of right hip     Primary osteoarthritis of left knee 05/21/2019    Bone spur of toe of left foot     Primary osteoarthritis of left foot     Pain, foot, left, chronic     Diarrhea 08/10/2017    Restless leg syndrome, uncontrolled 07/27/2016    Snoring 05/10/2016    Exercise counseling 05/10/2016    Diverticulosis of intestine without bleeding 03/14/2016    Gastroesophageal reflux disease without esophagitis 03/14/2016    Hemorrhoids     Tubular adenoma     Migraine without aura and without status migrainosus, not intractable 11/10/2015    Liver hemangioma     Elevated liver enzymes     Hyperlipidemia with target LDL less than 130 05/11/2015    Migraine headache 05/11/2015    BMI 25.0-25.9,adult 05/11/2015    Diverticulosis     Colon polyp            ILIANA ROE, APRN - CNP on 7/29/2021 at 5:49 AM

## 2021-07-29 NOTE — OP NOTE
Operative Note    PROCEDURE NOTE    DATE OF PROCEDURE: 7/29/2021    SURGEON: Elsie Adkins MD  Facility : [facility]  ASSISTANT: None  Anesthesia: MAC  PREOPERATIVE DIAGNOSIS:   History of polyps    POSTOPERATIVE DIAGNOSIS: as described below    OPERATION: Total colonoscopy     ANESTHESIA: Moderate Sedation    ESTIMATED BLOOD LOSS: less than 50     COMPLICATIONS: None. SPECIMENS:  Was Not Obtained    HISTORY: The patient is a 66y.o. year old female with history of above preop diagnosis. I recommended colonoscopy with possible biopsy or polypectomy and I explained the risk, benefits, expected outcome, and alternatives to the procedure. Risks included but are not limited to bleeding, infection, respiratory distress, hypotension, and perforation of the colon and possibility of missing a lesion. The patient understands and is in agreement. The patient was counseled at length about the risks of colt Covid-19 during their perioperative period and any recovery window from their procedure. The patient was made aware that colt Covid-19  may worsen their prognosis for recovering from their procedure  and lend to a higher morbidity and/or mortality risk. All material risks, benefits, and reasonable alternatives including postponing the procedure were discussed. The patient does wish to proceed with the procedure at this time. PROCEDURE: The patient was given IV conscious sedation. The patient's SPO2 remained above 90% throughout the procedure. The colonoscope was inserted per rectum and advanced under direct vision to the cecum without difficulty. Post sedation note : The patient's SPO2 remained above 90% throughout the procedure. the vital signs remained stable , and no immediate complication form the procedure noted, patient will be ready for d/c when criteria is met . The prep was good.       Findings:  Terminal ileum: normal    Cecum/Ascending colon:
complication form the procedure noted, patient will be ready for d/c when criteria is met . Findings:    Retropharyngeal area was grossly normal appearing    Esophagus: abnormal: Small circular ulcer measuring about 6 mm biopsy was taken        Stomach:    Fundus: normal    Body: abnormal: Gastritis, significant, biopsies were taken    Antrum: abnormal: Gastritis, significant, biopsies were taken    Duodenum:     Descending: normal    Bulb: normal    The scope was removed and the patient tolerated the procedure well. Recommendations/Plan:   1. F/U Biopsies  2. F/U In Office in 3-4 weeks  3.  Discussed with the family    Electronically signed by Aubrey Nevarez MD  on 7/29/2021 at 9:14 AM

## 2021-07-30 LAB — SURGICAL PATHOLOGY REPORT: NORMAL

## 2021-10-04 NOTE — PROGRESS NOTES
GI CLINIC FOLLOW UP    INTERVAL HISTORY:   No referring provider defined for this encounter. Chief Complaint   Patient presents with    Follow-up     Colonoscopy/EGD     Nausea    Diarrhea     not everyday just sometimes. HISTORY OF PRESENT ILLNESS: Jaimee Warner is a 66 y.o. female , referred for evaluation of*     Gastroesophageal reflux disease without esophagitis    2. Diarrhea, unspecified type    3. Diverticulosis of intestine without bleeding, unspecified intestinal tract location    4. Elevated liver enzymes    5. Liver hemangioma    6. Hx of colonic polyps        Patient is here for follow-up  We have seen her with the above  Last visit we ordered EGD and a colonoscopy on her  She is due for the repeat colonoscopy at that time because of history of polyps although her most recent colonoscopy as below not showing any polyps, only hemorrhoids  Her EGD was done because of her acid reflux, and it showed some circular ulcer measuring around 6 mm we took biopsies from the abdomen the biopsies negative as below  She has no odynophagia or dysphagia  She did have gastritis and the biopsy was negative for H. Pylori  Currently the patient is asymptomatic and doing well except very rarely she feels some nausea in the morning  Other than that she denied any other issues  No recent labs  Or her labs from last year were all normal with normal liver enzymes  In her CBC in January was all normal also    Findings:     Retropharyngeal area was grossly normal appearing     Esophagus: abnormal: Small circular ulcer measuring about 6 mm biopsy was taken     Stomach:    Fundus: normal    Body: abnormal: Gastritis, significant, biopsies were taken    Antrum: abnormal: Gastritis, significant, biopsies were taken     Duodenum:     Descending: normal    Bulb: normal     The scope was removed and the patient tolerated the procedure well.      Recommendations/Plan:   1. F/U Biopsies  2.  F/U In Office in 3-4 Overweight (BMI 25.0-29.9) 05/11/2015    Restless legs syndrome (RLS)     on Mirapex    Tubular adenoma        Past Surgical History:   Procedure Laterality Date    BREAST BIOPSY Left     cancer found    BREAST RECONSTRUCTION Left     using abdominal tissue    CATARACT REMOVAL WITH IMPLANT Bilateral     COLONOSCOPY      COLONOSCOPY  2 10 15    DIVERTICULOSIS AND HEMORRHOIDS , TUBULAR ADENOMA     COLONOSCOPY N/A 2/21/2018    COLONOSCOPY WITH BIOPSY performed by Debbie Horvath MD at [address] COLONOSCOPY N/A 7/29/2021    COLONOSCOPY DIAGNOSTIC performed by Debbie Horvath MD at [address], COLON, DIAGNOSTIC      EYE SURGERY      MASTECTOMY      MASTECTOMY Left     remainder of breast was removed because the margins were not clear on the partial mastectomy    MASTECTOMY, PARTIAL Left     for cancer    GA REMOVAL OF HEEL SPUR Left 11/5/2018    EXOSTECTOMY DORSAL FOOT performed by Mari Giron DPM at [address]      L3,4,5, fusion   [address] Right 2/9/2021    HIP TOTAL ARTHROPLASTY ASI performed by Lazarus Lax, MD at [facility] Left 5/21/2019    KNEE TOTAL ARTHROPLASTY performed by Lazarus Lax, MD at [facility] ENDOSCOPY N/A 7/29/2021    EGD BIOPSY OF STOMACH AND ESOPHAGEAL ULCER performed by Debbie Horvath MD at [address]:    Current Outpatient Medications:     ondansetron (ZOFRAN-ODT) 4 MG disintegrating tablet, Take 1 tablet by mouth every 8 hours as needed for Nausea or Vomiting, Disp: 30 tablet, Rfl: 0    meloxicam (MOBIC) 15 MG tablet, Take 15 mg by mouth daily, Disp: , Rfl:     atorvastatin (LIPITOR) 20 MG tablet, Take 1 tablet by mouth daily, Disp: 90 tablet, Rfl: 3    citalopram (CELEXA) 20 MG tablet, TAKE ONE TABLET BY MOUTH ONCE DAILY, Disp: 90 tablet, Rfl: 3    pramipexole (MIRAPEX) 0.5 MG tablet, TAKE ONE TABLET BY MOUTH ONCE NIGHTLY, Disp: 90 tablet, Rfl: 3    Magnesium 500 MG CAPS, Take 1 capsule by mouth daily, Disp: , Rfl:     melatonin 5 MG TABS tablet, Take 10 mg by mouth nightly as needed, Disp: , Rfl:     SUMAtriptan (IMITREX) 50 MG tablet, Take at onset of headache; may repeat in 2 hours; no more than 2 in 24 hours. , Disp: 9 tablet, Rfl: 11    CALCIUM CITRATE PO, Take 1,200 mg by mouth daily calciu, Disp: , Rfl:     Multiple Vitamins-Minerals (THERAPEUTIC MULTIVITAMIN-MINERALS) tablet, Take 1 tablet by mouth daily. , Disp: , Rfl:     omeprazole (PRILOSEC) 20 MG capsule, Take 20 mg by mouth daily.   , Disp: , Rfl:     ALLERGIES:   Allergies   Allergen Reactions    Darvon [Propoxyphene] Nausea And Vomiting and Other (See Comments)     Severe headaches    Morphine Nausea And Vomiting and Other (See Comments)     Severe headaches    Augmentin [Amoxicillin-Pot Clavulanate] Diarrhea and Nausea And Vomiting       FAMILY HISTORY:       Problem Relation Age of Onset    Ovarian Cancer Mother     Other Daughter         Wegener's disease    Heart Attack Father     Breast Cancer Maternal Grandmother          SOCIAL HISTORY:   Social History     Socioeconomic History    Marital status:      Spouse name: Not on file    Number of children: Not on file    Years of education: Not on file    Highest education level: Not on file   Occupational History    Not on file   Tobacco Use    Smoking status: Former Smoker     Packs/day: 0.50     Years: 3.00     Pack years: 1.50     Quit date:      Years since quittin.7    Smokeless tobacco: Never Used   Vaping Use    Vaping Use: Never used   Substance and Sexual Activity    Alcohol use: Yes     Comment: 1-2 per week    Drug use: No    Sexual activity: Not on file   Other Topics Concern    Not on file   Social History Narrative    Not on file     Social Determinants of Health     Financial Resource Strain:     Difficulty of Paying Living Expenses:    Food Insecurity:  Worried About [facility] in the Last Year:    [address] in the Last Year:    Transportation Needs:     Lack of Transportation (Medical):  Lack of Transportation (Non-Medical):    Physical Activity:     Days of Exercise per Week:     Minutes of Exercise per Session:    Stress:     Feeling of Stress :    Social Connections:     Frequency of Communication with Friends and Family:     Frequency of Social Gatherings with Friends and Family:     Attends Zoroastrianism Services:     Active Member of Clubs or Organizations:     Attends Club or Organization Meetings:     Marital Status:    Intimate Partner Violence:     Fear of Current or Ex-Partner:     Emotionally Abused:     Physically Abused:     Sexually Abused:        REVIEW OF SYSTEMS: A 12-point review of systemswas obtained and pertinent positives and negatives were enumerated above in the history of present illness. All other reviewed systems / symptoms were negative. Review of Systems        LABORATORY DATA: Reviewed  Lab Results   Component Value Date    WBC 8.4 01/26/2021    HGB 13.6 01/26/2021    HCT 41.7 01/26/2021    MCV 90.8 01/26/2021     01/26/2021     01/26/2021    K 4.2 01/26/2021     01/26/2021    CO2 23 01/26/2021    BUN 11 01/26/2021    CREATININE 0.74 01/26/2021    LABPROT 6.9 06/05/2012    LABALBU 4.2 07/01/2020    BILITOT 0.38 07/01/2020    ALKPHOS 86 07/01/2020    AST 18 07/01/2020    ALT 28 07/01/2020         Lab Results   Component Value Date    RBC 4.59 01/26/2021    HGB 13.6 01/26/2021    MCV 90.8 01/26/2021    MCH 29.7 01/26/2021    MCHC 32.7 01/26/2021    RDW 14.4 01/26/2021    MPV 8.2 01/26/2021    BASOPCT 0 01/26/2021    LYMPHSABS 2.90 01/26/2021    MONOSABS 0.80 01/26/2021    NEUTROABS 4.70 01/26/2021    EOSABS 0.00 01/26/2021    BASOSABS 0.00 01/26/2021         DIAGNOSTIC TESTING:     No results found. PHYSICAL EXAMINATION: Vital signs reviewed per the nursing documentation. /81   Pulse 82   Ht 5' 3\" (1.6 m)   Wt 150 lb 4.8 oz (68.2 kg)   SpO2 96%   BMI 26.62 kg/m²   Body mass index is 26.62 kg/m². Physical Exam  Vitals and nursing note reviewed. Constitutional:       General: She is not in acute distress. Appearance: She is well-developed. She is not diaphoretic. HENT:      Head: Normocephalic. Mouth/Throat:      Pharynx: No oropharyngeal exudate. Eyes:      General: No scleral icterus. Pupils: Pupils are equal, round, and reactive to light. Neck:      Thyroid: No thyromegaly. Vascular: No JVD. Trachea: No tracheal deviation. Cardiovascular:      Rate and Rhythm: Normal rate and regular rhythm. Heart sounds: Normal heart sounds. No murmur heard. Pulmonary:      Effort: Pulmonary effort is normal. No respiratory distress. Breath sounds: Normal breath sounds. No wheezing. Abdominal:      General: Bowel sounds are normal. There is no distension. Palpations: Abdomen is soft. Tenderness: There is no abdominal tenderness. There is no guarding or rebound. Comments: No ascites   Musculoskeletal:         General: Normal range of motion. Cervical back: Normal range of motion and neck supple. Skin:     General: Skin is warm. Coloration: Skin is not pale. Findings: No erythema or rash. Comments: She is not diaphoretic   Neurological:      Mental Status: She is alert and oriented to person, place, and time. Deep Tendon Reflexes: Reflexes are normal and symmetric. Psychiatric:         Behavior: Behavior normal.         Thought Content: Thought content normal.         Judgment: Judgment normal.           IMPRESSION: Ms. Lucie Pandya is a 66 y.o. female with      Diagnosis Orders   1. Adenomatous polyps     2. Gastroesophageal reflux disease without esophagitis     3. Liver hemangioma     4.  Diverticulosis       Prescribe the patient Zofran for as needed use  Continue with the Prilosec for now  Diet/life style/natural hx /complication of the dx were all explained in details   Past medical, past surgical, social history, psychiatric history, medications or allergies, all reviewed and  updated    Thank you for allowing me to participate in the care of Ms. Reina. For any further questions please do not hesitate to contact me. I have reviewed and agree with the ROS entered by the MA/RN. Note is dictated utilizing voice recognition software. Unfortunately this leads to occasional typographical errors. Please contact our office if you have any questions.       Quinn Carreon MD  [facility]  O: #[phone]

## 2021-10-05 ENCOUNTER — OFFICE VISIT (OUTPATIENT)
Dept: GASTROENTEROLOGY | Age: 71
End: 2021-10-05
Payer: MEDICARE

## 2021-10-05 VITALS
HEART RATE: 82 BPM | BODY MASS INDEX: 26.63 KG/M2 | SYSTOLIC BLOOD PRESSURE: 126 MMHG | OXYGEN SATURATION: 96 % | DIASTOLIC BLOOD PRESSURE: 81 MMHG | WEIGHT: 150.3 LBS | HEIGHT: 63 IN

## 2021-10-05 DIAGNOSIS — D36.9 ADENOMATOUS POLYPS: Primary | ICD-10-CM

## 2021-10-05 DIAGNOSIS — K21.9 GASTROESOPHAGEAL REFLUX DISEASE WITHOUT ESOPHAGITIS: ICD-10-CM

## 2021-10-05 DIAGNOSIS — K57.90 DIVERTICULOSIS: ICD-10-CM

## 2021-10-05 DIAGNOSIS — D18.03 LIVER HEMANGIOMA: ICD-10-CM

## 2021-10-05 PROCEDURE — 1090F PRES/ABSN URINE INCON ASSESS: CPT | Performed by: INTERNAL MEDICINE

## 2021-10-05 PROCEDURE — G8427 DOCREV CUR MEDS BY ELIG CLIN: HCPCS | Performed by: INTERNAL MEDICINE

## 2021-10-05 PROCEDURE — G8400 PT W/DXA NO RESULTS DOC: HCPCS | Performed by: INTERNAL MEDICINE

## 2021-10-05 PROCEDURE — 99214 OFFICE O/P EST MOD 30 MIN: CPT | Performed by: INTERNAL MEDICINE

## 2021-10-05 PROCEDURE — G8484 FLU IMMUNIZE NO ADMIN: HCPCS | Performed by: INTERNAL MEDICINE

## 2021-10-05 PROCEDURE — 1123F ACP DISCUSS/DSCN MKR DOCD: CPT | Performed by: INTERNAL MEDICINE

## 2021-10-05 PROCEDURE — 1036F TOBACCO NON-USER: CPT | Performed by: INTERNAL MEDICINE

## 2021-10-05 PROCEDURE — 3017F COLORECTAL CA SCREEN DOC REV: CPT | Performed by: INTERNAL MEDICINE

## 2021-10-05 PROCEDURE — G8417 CALC BMI ABV UP PARAM F/U: HCPCS | Performed by: INTERNAL MEDICINE

## 2021-10-05 PROCEDURE — 4040F PNEUMOC VAC/ADMIN/RCVD: CPT | Performed by: INTERNAL MEDICINE

## 2021-10-05 RX ORDER — ONDANSETRON 4 MG/1
4 TABLET, ORALLY DISINTEGRATING ORAL EVERY 8 HOURS PRN
Qty: 30 TABLET | Refills: 0 | Status: SHIPPED | OUTPATIENT
Start: 2021-10-05 | End: 2022-05-10 | Stop reason: SDUPTHER

## 2021-10-08 ENCOUNTER — HOSPITAL ENCOUNTER (OUTPATIENT)
Dept: WOMENS IMAGING | Age: 71
Discharge: HOME | End: 2021-10-10
Payer: MEDICARE

## 2021-10-08 DIAGNOSIS — Z12.31 ENCOUNTER FOR SCREENING MAMMOGRAM FOR MALIGNANT NEOPLASM OF BREAST: ICD-10-CM

## 2021-10-08 PROCEDURE — 77063 BREAST TOMOSYNTHESIS BI: CPT

## 2021-10-21 ENCOUNTER — TELEPHONE (OUTPATIENT)
Dept: PODIATRY | Age: 71
End: 2021-10-21

## 2021-10-22 ENCOUNTER — TELEPHONE (OUTPATIENT)
Dept: PODIATRY | Age: 71
End: 2021-10-22

## 2021-10-25 RX ORDER — MELOXICAM 15 MG/1
15 TABLET ORAL DAILY
Qty: 30 TABLET | Refills: 3 | Status: SHIPPED | OUTPATIENT
Start: 2021-10-25 | End: 2022-03-29

## 2021-10-25 NOTE — TELEPHONE ENCOUNTER
Patient called, she is in need of a refill of the medication that Dr Kari Wilkes gave her for inflammation. Patient states the pharm should be calling about this today.     Pharm is [facility]  [phone]    Please contact pt with any questions [phone]

## 2021-11-03 ENCOUNTER — OFFICE VISIT (OUTPATIENT)
Dept: PODIATRY | Age: 71
End: 2021-11-03
Payer: MEDICARE

## 2021-11-03 VITALS — WEIGHT: 150 LBS | HEIGHT: 63 IN | BODY MASS INDEX: 26.58 KG/M2

## 2021-11-03 DIAGNOSIS — M79.672 FOOT PAIN, LEFT: ICD-10-CM

## 2021-11-03 DIAGNOSIS — M19.072 PRIMARY OSTEOARTHRITIS OF LEFT FOOT: Primary | ICD-10-CM

## 2021-11-03 DIAGNOSIS — R60.0 EDEMA OF LEFT FOOT: ICD-10-CM

## 2021-11-03 PROCEDURE — G8400 PT W/DXA NO RESULTS DOC: HCPCS | Performed by: PODIATRIST

## 2021-11-03 PROCEDURE — 3017F COLORECTAL CA SCREEN DOC REV: CPT | Performed by: PODIATRIST

## 2021-11-03 PROCEDURE — 4040F PNEUMOC VAC/ADMIN/RCVD: CPT | Performed by: PODIATRIST

## 2021-11-03 PROCEDURE — 1036F TOBACCO NON-USER: CPT | Performed by: PODIATRIST

## 2021-11-03 PROCEDURE — G8427 DOCREV CUR MEDS BY ELIG CLIN: HCPCS | Performed by: PODIATRIST

## 2021-11-03 PROCEDURE — G8417 CALC BMI ABV UP PARAM F/U: HCPCS | Performed by: PODIATRIST

## 2021-11-03 PROCEDURE — 1123F ACP DISCUSS/DSCN MKR DOCD: CPT | Performed by: PODIATRIST

## 2021-11-03 PROCEDURE — G8484 FLU IMMUNIZE NO ADMIN: HCPCS | Performed by: PODIATRIST

## 2021-11-03 PROCEDURE — 99214 OFFICE O/P EST MOD 30 MIN: CPT | Performed by: PODIATRIST

## 2021-11-03 PROCEDURE — 1090F PRES/ABSN URINE INCON ASSESS: CPT | Performed by: PODIATRIST

## 2021-11-03 RX ORDER — PREDNISONE 10 MG/1
1 TABLET ORAL DAILY
Qty: 21 EACH | Refills: 0 | Status: SHIPPED | OUTPATIENT
Start: 2021-11-03 | End: 2021-11-17 | Stop reason: ALTCHOICE

## 2021-11-03 ASSESSMENT — ENCOUNTER SYMPTOMS
CONSTIPATION: 0
COLOR CHANGE: 0
DIARRHEA: 0
VOMITING: 0
NAUSEA: 0

## 2021-11-03 NOTE — PROGRESS NOTES
[facility]  Return Patient     Zaina Moore 66 y.o. female that presents for follow-up of   Chief Complaint   Patient presents with    Foot Pain     pain in the top of the left foot      Patient called last week to get in emergently. She has been having increased pain for about 4 weeks, no injury, better with shoes. Has tried topicals, Tylenol. Not much help. .     I did a dorsal exostectomy in 2018. Pain left foot came on about a weeks ago. She has been doing well, no pain since her surgery. Woke up with pain, no increase in activity. No injury, no change in shoes, no treatment. Feels good in sturdy shoe, and if she only puts pressure on her heel  Currently denies F/C/N/V. Allergies   Allergen Reactions    Darvon [Propoxyphene] Nausea And Vomiting and Other (See Comments)     Severe headaches    Morphine Nausea And Vomiting and Other (See Comments)     Severe headaches    Augmentin [Amoxicillin-Pot Clavulanate] Diarrhea and Nausea And Vomiting       Past Medical History:   Diagnosis Date    Arthritis     Bone spur of left foot     BRCA1 negative     Breast cancer ([facility] [address] 1998    left with reconstruction, no chemo or radiation    Colon polyp     Degenerative joint disease of right hip     Depression     Diverticulosis     Elevated liver enzymes     GERD (gastroesophageal reflux disease)     Hemorrhoids     Hyperlipidemia     Liver hemangioma     Migraine headache     Osteopenia     Overweight (BMI 25.0-29.9) 05/11/2015    Restless legs syndrome (RLS)     on Mirapex    Tubular adenoma        Prior to Admission medications    Medication Sig Start Date End Date Taking?  Authorizing Provider   predniSONE 10 MG (21) TBPK Take 1 tablet by mouth daily 60mg po on day 1, 50mg po on day 2, 40mg po on day 3, 30mg po on day 4, 20 mg po on day 5, 10mg po on day 6 11/3/21  Yes Curry Roach DPM   meloxicam (MOBIC) 15 MG tablet Take 1 tablet by mouth daily 10/25/21  Yes Juan R Sandoval, MD   ondansetron (ZOFRAN-ODT) 4 MG disintegrating tablet Take 1 tablet by mouth every 8 hours as needed for Nausea or Vomiting 10/5/21  Yes Marissa Magallon MD   meloxicam (MOBIC) 15 MG tablet Take 15 mg by mouth daily   Yes Historical Provider, MD   atorvastatin (LIPITOR) 20 MG tablet Take 1 tablet by mouth daily 1/11/21  Yes Divina Arboleda MD   citalopram (CELEXA) 20 MG tablet TAKE ONE TABLET BY MOUTH ONCE DAILY 1/11/21  Yes Divina Arboleda MD   pramipexole (MIRAPEX) 0.5 MG tablet TAKE ONE TABLET BY MOUTH ONCE NIGHTLY 1/11/21  Yes Divina Arboleda MD   Magnesium 500 MG CAPS Take 1 capsule by mouth daily   Yes Historical Provider, MD   melatonin 5 MG TABS tablet Take 10 mg by mouth nightly as needed   Yes Historical Provider, MD   SUMAtriptan (IMITREX) 50 MG tablet Take at onset of headache; may repeat in 2 hours; no more than 2 in 24 hours. 10/8/19  Yes Divina Arboleda MD   CALCIUM CITRATE PO Take 1,200 mg by mouth daily calciu   Yes Historical Provider, MD   Multiple Vitamins-Minerals (THERAPEUTIC MULTIVITAMIN-MINERALS) tablet Take 1 tablet by mouth daily. Yes Historical Provider, MD   omeprazole (PRILOSEC) 20 MG capsule Take 20 mg by mouth daily. Yes Historical Provider, MD       Review of Systems   Constitutional: Negative for chills, diaphoresis, fatigue, fever and unexpected weight change. Cardiovascular: Negative for leg swelling. Gastrointestinal: Negative for constipation, diarrhea, nausea and vomiting. Musculoskeletal: Positive for arthralgias, gait problem and joint swelling. Skin: Negative for color change, pallor, rash and wound. Neurological: Negative for weakness and numbness. Lower Extremity Physical Examination:     Vitals: There were no vitals filed for this visit. General: AAO x 3 in NAD.      Integument:   Warm, dry, supple, Bilateral.  Open lesion absent, Bilateral.        Vascular: DP and PT pulses palpable 2/4, Bilateral.  CFT <3 seconds, Bilateral.  Hair growth absent to the level of the digits, Bilateral.  Edema present, Left. Varicosities absent, Bilateral. Erythema present, Left, increased temperature left midfoot. Neurological:  Sensation present to light touch to level of digits, Bilateral.    Musculoskeletal: Muscle strength 5/5, Left. Pain present upon palpation of lateral tarsal metatarsal joint, no pain over 1,2 met cun joint, pain 3 met cun joint, and 4-5 met cuboid joint, Left. normal medial longitudinal arch, Bilateral.  Ankle ROM normal,Bilateral.      Radiographs: 3 views left Foot:  Severe degenerative changes at the tarsal metatarsal joint. No acute pathology. Asessment: Patient is a 66 y.o. female with:   1. Primary osteoarthritis of left foot    2. Edema of left foot    3. Foot pain, left        Plan: Patient examined and evaluated. Current condition and treatment options discussed in detail. Verbal and written instructions given to patient. Contact office with any questions/problems/concerns. Prednisone taper  Offered a Darco shoe or short CAM walker-she declined for now. Rest, ice  Sturdy shoe  Powerstep    Discussed injection. She declined    Orders Placed This Encounter   Procedures    XR FOOT LEFT (MIN 3 VIEWS)     Orders Placed This Encounter   Medications    predniSONE 10 MG (21) TBPK     Sig: Take 1 tablet by mouth daily 60mg po on day 1, 50mg po on day 2, 40mg po on day 3, 30mg po on day 4, 20 mg po on day 5, 10mg po on day 6     Dispense:  21 each     Refill:  0        RTC in 2week(s).     11/3/2021

## 2021-11-17 ENCOUNTER — OFFICE VISIT (OUTPATIENT)
Dept: PODIATRY | Age: 71
End: 2021-11-17
Payer: MEDICARE

## 2021-11-17 VITALS — BODY MASS INDEX: 26.58 KG/M2 | HEIGHT: 63 IN | WEIGHT: 150 LBS

## 2021-11-17 DIAGNOSIS — R60.0 EDEMA OF LEFT FOOT: ICD-10-CM

## 2021-11-17 DIAGNOSIS — M79.672 FOOT PAIN, LEFT: ICD-10-CM

## 2021-11-17 DIAGNOSIS — M19.072 PRIMARY OSTEOARTHRITIS OF LEFT FOOT: Primary | ICD-10-CM

## 2021-11-17 PROCEDURE — G8484 FLU IMMUNIZE NO ADMIN: HCPCS | Performed by: PODIATRIST

## 2021-11-17 PROCEDURE — 3017F COLORECTAL CA SCREEN DOC REV: CPT | Performed by: PODIATRIST

## 2021-11-17 PROCEDURE — 1036F TOBACCO NON-USER: CPT | Performed by: PODIATRIST

## 2021-11-17 PROCEDURE — 4040F PNEUMOC VAC/ADMIN/RCVD: CPT | Performed by: PODIATRIST

## 2021-11-17 PROCEDURE — 99213 OFFICE O/P EST LOW 20 MIN: CPT | Performed by: PODIATRIST

## 2021-11-17 PROCEDURE — 1090F PRES/ABSN URINE INCON ASSESS: CPT | Performed by: PODIATRIST

## 2021-11-17 PROCEDURE — G8427 DOCREV CUR MEDS BY ELIG CLIN: HCPCS | Performed by: PODIATRIST

## 2021-11-17 PROCEDURE — G8417 CALC BMI ABV UP PARAM F/U: HCPCS | Performed by: PODIATRIST

## 2021-11-17 PROCEDURE — G8400 PT W/DXA NO RESULTS DOC: HCPCS | Performed by: PODIATRIST

## 2021-11-17 PROCEDURE — 1123F ACP DISCUSS/DSCN MKR DOCD: CPT | Performed by: PODIATRIST

## 2021-11-17 ASSESSMENT — ENCOUNTER SYMPTOMS
VOMITING: 0
COLOR CHANGE: 0
DIARRHEA: 0
CONSTIPATION: 0
NAUSEA: 0

## 2021-11-17 NOTE — PROGRESS NOTES
[facility]  Return Patient     Zaina Fam 66 y.o. female that presents for follow-up of   Chief Complaint   Patient presents with    Check-Up     follow up left foot, doing better      She is much better, feels good with the Powersteps. History: Patient called last week to get in emergently. She has been having increased pain for about 4 weeks, no injury, better with shoes. Has tried topicals, Tylenol. Not much help. .     I did a dorsal exostectomy in 2018. Pain left foot came on about a weeks ago. She has been doing well, no pain since her surgery. Woke up with pain, no increase in activity. No injury, no change in shoes, no treatment. Feels good in sturdy shoe, and if she only puts pressure on her heel  Currently denies F/C/N/V. Allergies   Allergen Reactions    Darvon [Propoxyphene] Nausea And Vomiting and Other (See Comments)     Severe headaches    Morphine Nausea And Vomiting and Other (See Comments)     Severe headaches    Augmentin [Amoxicillin-Pot Clavulanate] Diarrhea and Nausea And Vomiting       Past Medical History:   Diagnosis Date    Arthritis     Bone spur of left foot     BRCA1 negative     Breast [facility] [address] [address] 1998    left with reconstruction, no chemo or radiation    Colon polyp     Degenerative joint disease of right hip     Depression     Diverticulosis     Elevated liver enzymes     GERD (gastroesophageal reflux disease)     Hemorrhoids     Hyperlipidemia     Liver hemangioma     Migraine headache     Osteopenia     Overweight (BMI 25.0-29.9) 05/11/2015    Restless legs syndrome (RLS)     on Mirapex    Tubular adenoma        Prior to Admission medications    Medication Sig Start Date End Date Taking?  Authorizing Provider   meloxicam (MOBIC) 15 MG tablet Take 1 tablet by mouth daily 10/25/21  Yes Mariah Mancera MD   ondansetron (ZOFRAN-ODT) 4 MG disintegrating tablet Take 1 tablet by mouth every 8 hours as needed for Nausea or Vomiting 10/5/21 Yes Marissa Magallon MD   meloxicam (MOBIC) 15 MG tablet Take 15 mg by mouth daily   Yes Historical Provider, MD   atorvastatin (LIPITOR) 20 MG tablet Take 1 tablet by mouth daily 1/11/21  Yes Len Monroe MD   citalopram (CELEXA) 20 MG tablet TAKE ONE TABLET BY MOUTH ONCE DAILY 1/11/21  Yes Len Monroe MD   pramipexole (MIRAPEX) 0.5 MG tablet TAKE ONE TABLET BY MOUTH ONCE NIGHTLY 1/11/21  Yes Len Monroe MD   Magnesium 500 MG CAPS Take 1 capsule by mouth daily   Yes Historical Provider, MD   melatonin 5 MG TABS tablet Take 10 mg by mouth nightly as needed   Yes Historical Provider, MD   SUMAtriptan (IMITREX) 50 MG tablet Take at onset of headache; may repeat in 2 hours; no more than 2 in 24 hours. 10/8/19  Yes Len Monroe MD   CALCIUM CITRATE PO Take 1,200 mg by mouth daily calciu   Yes Historical Provider, MD   Multiple Vitamins-Minerals (THERAPEUTIC MULTIVITAMIN-MINERALS) tablet Take 1 tablet by mouth daily. Yes Historical Provider, MD   omeprazole (PRILOSEC) 20 MG capsule Take 20 mg by mouth daily. Yes Historical Provider, MD       Review of Systems   Constitutional: Negative for chills, diaphoresis, fatigue, fever and unexpected weight change. Cardiovascular: Negative for leg swelling. Gastrointestinal: Negative for constipation, diarrhea, nausea and vomiting. Musculoskeletal: Positive for arthralgias, gait problem and joint swelling. Skin: Negative for color change, pallor, rash and wound. Neurological: Negative for weakness and numbness. Lower Extremity Physical Examination:     Vitals: There were no vitals filed for this visit. General: AAO x 3 in NAD. Integument:   Warm, dry, supple, Bilateral.  Open lesion absent, Bilateral.        Vascular: DP and PT pulses palpable 2/4, Bilateral.  CFT <3 seconds, Bilateral.  Hair growth absent to the level of the digits, Bilateral.  Edema present, Left.   Varicosities absent, Bilateral. Erythema present, Left, increased temperature left midfoot. Neurological:  Sensation present to light touch to level of digits, Bilateral.    Musculoskeletal: Muscle strength 5/5, Left. Minimal Pain present upon palpation of lateral tarsal metatarsal joint, no pain over 1,2 met cun joint, pain 3 met cun joint, and 4-5 met cuboid joint, Left. normal medial longitudinal arch, Bilateral.  Ankle ROM normal,Bilateral.      Radiographs: 3 views left Foot:  Severe degenerative changes at the tarsal metatarsal joint. No acute pathology. Asessment: Patient is a 66 y.o. female with:   1. Primary osteoarthritis of left foot    2. Edema of left foot    3. Foot pain, left        Plan: Patient examined and evaluated. Current condition and treatment options discussed in detail. Verbal and written instructions given to patient. Contact office with any questions/problems/concerns. Prednisone taper-in the future if needed  Offered a Darco shoe or short CAM walker-may need  Rest, ice  Sturdy shoe  Powerstep    Discussed injection. No orders of the defined types were placed in this encounter. No orders of the defined types were placed in this encounter.        RTC in as needed  11/17/2021

## 2021-12-29 DIAGNOSIS — Z96.642 HISTORY OF LEFT HIP REPLACEMENT: Primary | ICD-10-CM

## 2021-12-29 RX ORDER — TRAMADOL HYDROCHLORIDE 50 MG/1
50 TABLET ORAL EVERY 4 HOURS PRN
Qty: 42 TABLET | Refills: 0 | Status: SHIPPED | OUTPATIENT
Start: 2021-12-29 | End: 2022-01-05

## 2021-12-29 NOTE — TELEPHONE ENCOUNTER
Patient called to request pain medication due to a recent fall. She believes that she may have reinjured her right knee in this fall and she has scheduled an appointment for January 5th but would like something ordered for pain to help her until then. Please send order to:  [facility] on [address].

## 2021-12-29 NOTE — TELEPHONE ENCOUNTER
What would you like to prescribe for pain? Patient called to request pain medication due to a recent fall. She believes that she may have reinjured her right knee in this fall and she has scheduled an appointment for January 5th but would like something ordered for pain to help her until then.       Please send order to:  [facility].

## 2022-01-05 ENCOUNTER — OFFICE VISIT (OUTPATIENT)
Dept: ORTHOPEDIC SURGERY | Age: 72
End: 2022-01-05

## 2022-01-05 DIAGNOSIS — G89.29 CHRONIC PAIN OF RIGHT KNEE: Primary | ICD-10-CM

## 2022-01-05 DIAGNOSIS — M25.561 CHRONIC PAIN OF RIGHT KNEE: Primary | ICD-10-CM

## 2022-01-05 PROCEDURE — 99024 POSTOP FOLLOW-UP VISIT: CPT | Performed by: ORTHOPAEDIC SURGERY

## 2022-01-05 NOTE — PROGRESS NOTES
Bethel Ramos M.D.            [facility] [address], [address] [facility]           Dept Phone: [phone]           Dept Fax:  [phone]           [place]          Dept Phone: [phone]           Dept Fax:  [phone]      Chief Compliant:  Chief Complaint   Patient presents with    Pain     RT KNEE        History of Present Illness: This is a 66 y.o. female who presents to the clinic today for evaluation / follow up of left knee pain. Patient 66 of patient who is status post previous right total of arthroplasty and also left total knee arthroplasty. Patient was doing well until last week. She was coming down the basement steps and came down hard on her left leg with a jamming to her left knee. She has had some anterior knee pain ever since she has been ambulating on it albeit with some discomfort. She is here for first-time evaluation since that incident. .       Review of Systems   Constitutional: Negative for fever, chills, sweats. Eyes: Negative for changes in vision, or pain. HENT: Negative for ear ache, epistaxis, or sore throat. Respiratory/Cardio: Negative for Chest pain, palpitations, SOB, or cough. Gastrointestinal: Negative for abdominal pain, N/V/D. Genitourinary: Negative for dysuria, frequency, urgency, or hematuria. Neurological: Negative for headache, numbness, or weakness. Integumentary: Negative for rash, itching, laceration, or abrasion. Musculoskeletal: Positive for Pain (RT KNEE)       Physical Exam:  Constitutional: Patient is oriented to person, place, and time. Patient appears well-developed and well nourished. HENT: Negative otherwise noted  Head: Normocephalic and Atraumatic  Nose: Normal  Eyes: Conjunctivae and EOM are normal  Neck: Normal range of motion Neck supple. Respiratory/Cardio: Effort normal. No respiratory distress. Musculoskeletal: Examination of her left knee notes that she has tenderness about the patella. She has however active extension against resistance with no obvious quadriceps or patellar tendon rupture she has good varus and valgus stability as well no other obvious contributory findings. Neurological: Patient is alert and oriented to person, place, and time. Normal strenght. No sensory deficit. Skin: Skin is warm and dry  Psychiatric: Behavior is normal. Thought content normal.  Nursing note and vitals reviewed. Labs and Imaging:     XR taken today:  XR KNEE LEFT (1-2 VIEWS)    Result Date: 1/5/2022  X-rays taken they reviewed by me show standing AP and lateral of patient's left knee. Patient status post left total knee arthroplasty. The AP view is unremarkable lateral view shows a small crack in the superior one half of the patella with no displacement        No orders of the defined types were placed in this encounter. Assessment and Plan:  1. Chronic pain of right knee    2. Acute nondisplaced transverse patellar fracture left total knee      This is a 66 y.o. female who presents to the clinic today for evaluation / follow up of patellar fracture and left total knee nondisplaced. Past History:    Current Outpatient Medications:     traMADol (ULTRAM) 50 MG tablet, Take 1 tablet by mouth every 4 hours as needed for Pain for up to 7 days. Intended supply: 7 days.  Take lowest dose possible to manage pain, Disp: 42 tablet, Rfl: 0    meloxicam (MOBIC) 15 MG tablet, Take 1 tablet by mouth daily, Disp: 30 tablet, Rfl: 3    ondansetron (ZOFRAN-ODT) 4 MG disintegrating tablet, Take 1 tablet by mouth every 8 hours as needed for Nausea or Vomiting, Disp: 30 tablet, Rfl: 0    meloxicam (MOBIC) 15 MG tablet, Take 15 mg by mouth daily, Disp: , Rfl:     atorvastatin (LIPITOR) 20 MG tablet, Take 1 tablet by mouth daily, Disp: 90 tablet, Rfl: 3    citalopram (CELEXA) 20 MG tablet, TAKE ONE TABLET BY MOUTH ONCE DAILY, Disp: 90 tablet, Rfl: 3    pramipexole (MIRAPEX) 0.5 MG tablet, TAKE ONE TABLET BY MOUTH ONCE NIGHTLY, Disp: 90 tablet, Rfl: 3    Magnesium 500 MG CAPS, Take 1 capsule by mouth daily, Disp: , Rfl:     melatonin 5 MG TABS tablet, Take 10 mg by mouth nightly as needed, Disp: , Rfl:     SUMAtriptan (IMITREX) 50 MG tablet, Take at onset of headache; may repeat in 2 hours; no more than 2 in 24 hours. , Disp: 9 tablet, Rfl: 11    CALCIUM CITRATE PO, Take 1,200 mg by mouth daily calciu, Disp: , Rfl:     Multiple Vitamins-Minerals (THERAPEUTIC MULTIVITAMIN-MINERALS) tablet, Take 1 tablet by mouth daily. , Disp: , Rfl:     omeprazole (PRILOSEC) 20 MG capsule, Take 20 mg by mouth daily.   , Disp: , Rfl:   Allergies   Allergen Reactions    Darvon [Propoxyphene] Nausea And Vomiting and Other (See Comments)     Severe headaches    Morphine Nausea And Vomiting and Other (See Comments)     Severe headaches    Augmentin [Amoxicillin-Pot Clavulanate] Diarrhea and Nausea And Vomiting     Social History     Socioeconomic History    Marital status:      Spouse name: Not on file    Number of children: Not on file    Years of education: Not on file    Highest education level: Not on file   Occupational History    Not on file   Tobacco Use    Smoking status: Former Smoker     Packs/day: 0.50     Years: 3.00     Pack years: 1.50     Quit date:      Years since quittin.0    Smokeless tobacco: Never Used   Vaping Use    Vaping Use: Never used   Substance and Sexual Activity    Alcohol use: Yes     Comment: 1-2 per week    Drug use: No    Sexual activity: Not on file   Other Topics Concern    Not on file   Social History Narrative    Not on file     Social Determinants of Health     Financial Resource Strain:     Difficulty of Paying Living Expenses: Not on file   Food Insecurity:     Worried About [address] in the Last Year: Not on file    Ran Out of Food in the Last Year: Not on file   Transportation Needs:     Lack of Transportation (Medical): Not on file    Lack of Transportation (Non-Medical):  Not on file   Physical Activity:     Days of Exercise per Week: Not on file    Minutes of Exercise per Session: Not on file   Stress:     Feeling of Stress : Not on file   Social Connections:     Frequency of Communication with Friends and Family: Not on file    Frequency of Social Gatherings with Friends and Family: Not on file    Attends Sabianist Services: Not on file    Active Member of [address] [place] [address] or Organizations: Not on file    Attends Club or Organization Meetings: Not on file    Marital Status: Not on file   Intimate Partner Violence:     Fear of Current or Ex-Partner: Not on file    Emotionally Abused: Not on file    Physically Abused: Not on file    Sexually Abused: Not on file   Housing Stability:     Unable to Pay for Housing in the Last Year: Not on file    Number of [place] in the Last Year: Not on file    Unstable Housing in the Last Year: Not on file     Past Medical History:   Diagnosis Date    Arthritis     Bone spur of left foot     BRCA1 negative     Breast cancer ([facility] [address] 1998    left with reconstruction, no chemo or radiation    Colon polyp     Degenerative joint disease of right hip     Depression     Diverticulosis     Elevated liver enzymes     GERD (gastroesophageal reflux disease)     Hemorrhoids     Hyperlipidemia     Liver hemangioma     Migraine headache     Osteopenia     Overweight (BMI 25.0-29.9) 05/11/2015    Restless legs syndrome (RLS)     on Mirapex    Tubular adenoma      Past Surgical History:   Procedure Laterality Date    BREAST BIOPSY Left     cancer found    BREAST RECONSTRUCTION Left     using abdominal tissue    CATARACT REMOVAL WITH IMPLANT Bilateral     COLONOSCOPY      COLONOSCOPY  2 10 15    DIVERTICULOSIS AND HEMORRHOIDS , TUBULAR ADENOMA     COLONOSCOPY N/A 2/21/2018    COLONOSCOPY WITH BIOPSY performed by Carola Freedman MD [facility] COLONOSCOPY N/A 7/29/2021    COLONOSCOPY DIAGNOSTIC performed by Carola Freedman MD at [address] [facility] [address], COLON, DIAGNOSTIC      EYE SURGERY      MASTECTOMY      MASTECTOMY Left     remainder of breast was removed because the margins were not clear on the partial mastectomy    MASTECTOMY, PARTIAL Left     for cancer    MI REMOVAL OF HEEL SPUR Left 11/5/2018    EXOSTECTOMY DORSAL FOOT performed by Estefania Marino DPM at [address]      L3,4,5, fusion   [address] Right 2/9/2021    HIP TOTAL ARTHROPLASTY ASI performed by Andra Li MD at [facility] [address] Left 5/21/2019    KNEE TOTAL ARTHROPLASTY performed by Andra Li MD at [address] [facility] ENDOSCOPY N/A 7/29/2021    EGD BIOPSY OF STOMACH AND ESOPHAGEAL ULCER performed by Carola Freedman MD at [address] [facility]     Family History   Problem Relation Age of Onset    Ovarian Cancer Mother     Other Daughter         Wegener's disease    Heart Attack Father     Breast Cancer Maternal Grandmother    Plan  Patient was placed into a hinged brace initially set at 0 to 20 degrees. She can ambulate as tolerated we will see her back in the next couple weeks for repeat x-rays and opening of the brace. Provider Attestation:  Damion Trujillo, personally performed the services described in this documentation. All medical record entries made by the scribe were at my direction and in my presence. I have reviewed the chart and discharge instructions and agree that the records reflect my personal performance and is accurate and complete. Andra Li MD. 01/05/22      Please note that this chart was generated using voice recognition Dragon dictation software.   Although every effort was made to ensure the accuracy of this automated transcription, some errors in transcription may have occurred.

## 2022-01-26 ENCOUNTER — OFFICE VISIT (OUTPATIENT)
Dept: ORTHOPEDIC SURGERY | Age: 72
End: 2022-01-26

## 2022-01-26 DIAGNOSIS — M25.562 LEFT KNEE PAIN, UNSPECIFIED CHRONICITY: Primary | ICD-10-CM

## 2022-01-26 PROCEDURE — 99024 POSTOP FOLLOW-UP VISIT: CPT | Performed by: ORTHOPAEDIC SURGERY

## 2022-01-26 NOTE — PROGRESS NOTES
Umu Wilkerson M.D.            [address] [place] [address]., [address] [facility],[address], [address] [facility]           Dept Phone: [phone]           Dept Fax:  [phone] [address] [facility]           [place]          Dept Phone: [phone]           Dept Fax:  [phone]      Chief Compliant:  Chief Complaint   Patient presents with    Fracture     LT TKA Patella fx        History of Present Illness: This is a 66 y.o. female who presents to the clinic today for evaluation / follow up of 3-week follow-up for nondisplaced patellar fracture left total knee. Patient states that she really not much pain. She has been wearing a hinged knee brace in extension. Review of Systems   Constitutional: Negative for fever, chills, sweats. Eyes: Negative for changes in vision, or pain. HENT: Negative for ear ache, epistaxis, or sore throat. Respiratory/Cardio: Negative for Chest pain, palpitations, SOB, or cough. Gastrointestinal: Negative for abdominal pain, N/V/D. Genitourinary: Negative for dysuria, frequency, urgency, or hematuria. Neurological: Negative for headache, numbness, or weakness. Integumentary: Negative for rash, itching, laceration, or abrasion. Musculoskeletal: Positive for Fracture (LT TKA Patella fx)       Physical Exam:  Constitutional: Patient is oriented to person, place, and time. Patient appears well-developed and well nourished. HENT: Negative otherwise noted  Head: Normocephalic and Atraumatic  Nose: Normal  Eyes: Conjunctivae and EOM are normal  Neck: Normal range of motion Neck supple. Respiratory/Cardio: Effort normal. No respiratory distress. Musculoskeletal: Physical examination the brace knows that she is this is a hint of tenderness with direct pressure. She can actively hold up against gravity.   She has a little bit discomfort when resistance is added. She can passively flex to 70 degrees without much discomfort and she can actively extend extend against gravity against this. No other contributory findings. Neurological: Patient is alert and oriented to person, place, and time. Normal strenght. No sensory deficit. Skin: Skin is warm and dry  Psychiatric: Behavior is normal. Thought content normal.  Nursing note and vitals reviewed. Labs and Imaging:     XR taken today:  XR KNEE LEFT (1-2 VIEWS)    Result Date: 1/26/2022  X-rays taken today reviewed by me show AP lateral views of the patient's left knee. Patient is status post left total knee arthroplasty. . Did have a nondisplaced superior pole patellar fracture. This appears to be stable on both AP lateral views there is been no separation of the fragments and appears to be no obvious complications regarding the patellar button. Difficult to assess whether actual healing has occurred          Orders Placed This Encounter   Procedures    XR KNEE LEFT (1-2 VIEWS)     Standing Status:   Future     Number of Occurrences:   1     Standing Expiration Date:   1/26/2023       Assessment and Plan:  1. Left knee pain, unspecified chronicity    2. Nondisplaced patella fracture status post left total knee        This is a 66 y.o. female who presents to the clinic today for evaluation / follow up of nondisplaced patella fracture status post left total knee.      Past History:    Current Outpatient Medications:     meloxicam (MOBIC) 15 MG tablet, Take 1 tablet by mouth daily, Disp: 30 tablet, Rfl: 3    ondansetron (ZOFRAN-ODT) 4 MG disintegrating tablet, Take 1 tablet by mouth every 8 hours as needed for Nausea or Vomiting, Disp: 30 tablet, Rfl: 0    meloxicam (MOBIC) 15 MG tablet, Take 15 mg by mouth daily, Disp: , Rfl:     atorvastatin (LIPITOR) 20 MG tablet, Take 1 tablet by mouth daily, Disp: 90 tablet, Rfl: 3    citalopram (CELEXA) 20 MG tablet, TAKE ONE TABLET BY MOUTH ONCE DAILY, Disp: 90 tablet, Rfl: 3    pramipexole (MIRAPEX) 0.5 MG tablet, TAKE ONE TABLET BY MOUTH ONCE NIGHTLY, Disp: 90 tablet, Rfl: 3    Magnesium 500 MG CAPS, Take 1 capsule by mouth daily, Disp: , Rfl:     melatonin 5 MG TABS tablet, Take 10 mg by mouth nightly as needed, Disp: , Rfl:     SUMAtriptan (IMITREX) 50 MG tablet, Take at onset of headache; may repeat in 2 hours; no more than 2 in 24 hours. , Disp: 9 tablet, Rfl: 11    CALCIUM CITRATE PO, Take 1,200 mg by mouth daily calciu, Disp: , Rfl:     Multiple Vitamins-Minerals (THERAPEUTIC MULTIVITAMIN-MINERALS) tablet, Take 1 tablet by mouth daily. , Disp: , Rfl:     omeprazole (PRILOSEC) 20 MG capsule, Take 20 mg by mouth daily.   , Disp: , Rfl:   Allergies   Allergen Reactions    Darvon [Propoxyphene] Nausea And Vomiting and Other (See Comments)     Severe headaches    Morphine Nausea And Vomiting and Other (See Comments)     Severe headaches    Augmentin [Amoxicillin-Pot Clavulanate] Diarrhea and Nausea And Vomiting     Social History     Socioeconomic History    Marital status:      Spouse name: Not on file    Number of children: Not on file    Years of education: Not on file    Highest education level: Not on file   Occupational History    Not on file   Tobacco Use    Smoking status: Former Smoker     Packs/day: 0.50     Years: 3.00     Pack years: 1.50     Quit date:      Years since quittin.0    Smokeless tobacco: Never Used   Vaping Use    Vaping Use: Never used   Substance and Sexual Activity    Alcohol use: Yes     Comment: 1-2 per week    Drug use: No    Sexual activity: Not on file   Other Topics Concern    Not on file   Social History Narrative    Not on file     Social Determinants of Health     Financial Resource Strain:     Difficulty of Paying Living Expenses: Not on file   Food Insecurity:     Worried About Running Out of Food in the Last Year: Not on file    Roxanne of Food in the Last Year: Not on [employer] Needs:     Lack of Transportation (Medical): Not on file    Lack of Transportation (Non-Medical):  Not on file   Physical Activity:     Days of Exercise per Week: Not on file    Minutes of Exercise per Session: Not on file   Stress:     Feeling of Stress : Not on file   Social Connections:     Frequency of Communication with Friends and Family: Not on file    Frequency of Social Gatherings with Friends and Family: Not on file    Attends Taoist Services: Not on file    Active Member of [address] [place] [address] or Organizations: Not on file    Attends Club or Organization Meetings: Not on file    Marital Status: Not on file   Intimate Partner Violence:     Fear of Current or Ex-Partner: Not on file    Emotionally Abused: Not on file    Physically Abused: Not on file    Sexually Abused: Not on file   Housing Stability:     Unable to Pay for Housing in the Last Year: Not on file    Number of [place] in the Last Year: Not on file    Unstable Housing in the Last Year: Not on file     Past Medical History:   Diagnosis Date    Arthritis     Bone spur of left foot     BRCA1 negative     Breast cancer ([facility] [address] 1998    left with reconstruction, no chemo or radiation    Colon polyp     Degenerative joint disease of right hip     Depression     Diverticulosis     Elevated liver enzymes     GERD (gastroesophageal reflux disease)     Hemorrhoids     Hyperlipidemia     Liver hemangioma     Migraine headache     Osteopenia     Overweight (BMI 25.0-29.9) 05/11/2015    Restless legs syndrome (RLS)     on Mirapex    Tubular adenoma      Past Surgical History:   Procedure Laterality Date    BREAST BIOPSY Left     cancer found    BREAST RECONSTRUCTION Left     using abdominal tissue    CATARACT REMOVAL WITH IMPLANT Bilateral     COLONOSCOPY      COLONOSCOPY  2 10 15    DIVERTICULOSIS AND HEMORRHOIDS , TUBULAR ADENOMA     COLONOSCOPY N/A 2/21/2018    COLONOSCOPY WITH BIOPSY performed by John Mott MD at [address] [place] [address] OR    COLONOSCOPY N/A 7/29/2021    COLONOSCOPY DIAGNOSTIC performed by Merced Lau MD at [address], COLON, DIAGNOSTIC      EYE SURGERY      MASTECTOMY      MASTECTOMY Left     remainder of breast was removed because the margins were not clear on the partial mastectomy    MASTECTOMY, PARTIAL Left     for cancer    NH REMOVAL OF HEEL SPUR Left 11/5/2018    EXOSTECTOMY DORSAL FOOT performed by Abran Samuel DPM at [address]      L3,4,5, fusion   [address] Right 2/9/2021    HIP TOTAL ARTHROPLASTY ASI performed by Sindhu Buck MD at [address] Left 5/21/2019    KNEE TOTAL ARTHROPLASTY performed by Sindhu Buck MD at [facility] ENDOSCOPY N/A 7/29/2021    EGD BIOPSY OF STOMACH AND ESOPHAGEAL ULCER performed by Merced Lau MD at [facility]     Family History   Problem Relation Age of Onset    Ovarian Cancer Mother     Other Daughter         Wegener's disease    Heart Attack Father     Breast Cancer Maternal Grandmother    Plan  Patient was comfortable upon 7 degrees the. The hinged knee brace was then allowed to be open to 0-70 just to avoid her having a potential fall and hyperflexion of the knee. She could be weight-bear as tolerated while in hinged brace. We will see her back here in 3 weeks time for repeat x-rays and likely discontinuation of the brace at that time. Provider Attestation:  Mali Meraz, personally performed the services described in this documentation. All medical record entries made by the scribe were at my direction and in my presence. I have reviewed the chart and discharge instructions and agree that the records reflect my personal performance and is accurate and complete. Sindhu Buck MD. 01/26/22      Please note that this chart was generated using voice recognition Dragon dictation software.   Although every effort was made to ensure the accuracy of this automated transcription, some errors in transcription may have occurred.

## 2022-02-09 ENCOUNTER — OFFICE VISIT (OUTPATIENT)
Dept: ORTHOPEDIC SURGERY | Age: 72
End: 2022-02-09
Payer: MEDICARE

## 2022-02-09 DIAGNOSIS — Z96.641 H/O TOTAL HIP ARTHROPLASTY, RIGHT: Primary | ICD-10-CM

## 2022-02-09 PROCEDURE — 1090F PRES/ABSN URINE INCON ASSESS: CPT | Performed by: ORTHOPAEDIC SURGERY

## 2022-02-09 PROCEDURE — G8428 CUR MEDS NOT DOCUMENT: HCPCS | Performed by: ORTHOPAEDIC SURGERY

## 2022-02-09 PROCEDURE — G8417 CALC BMI ABV UP PARAM F/U: HCPCS | Performed by: ORTHOPAEDIC SURGERY

## 2022-02-09 PROCEDURE — 1123F ACP DISCUSS/DSCN MKR DOCD: CPT | Performed by: ORTHOPAEDIC SURGERY

## 2022-02-09 PROCEDURE — G8400 PT W/DXA NO RESULTS DOC: HCPCS | Performed by: ORTHOPAEDIC SURGERY

## 2022-02-09 PROCEDURE — G8484 FLU IMMUNIZE NO ADMIN: HCPCS | Performed by: ORTHOPAEDIC SURGERY

## 2022-02-09 PROCEDURE — 99213 OFFICE O/P EST LOW 20 MIN: CPT | Performed by: ORTHOPAEDIC SURGERY

## 2022-02-09 PROCEDURE — 1036F TOBACCO NON-USER: CPT | Performed by: ORTHOPAEDIC SURGERY

## 2022-02-09 PROCEDURE — 3017F COLORECTAL CA SCREEN DOC REV: CPT | Performed by: ORTHOPAEDIC SURGERY

## 2022-02-09 PROCEDURE — 4040F PNEUMOC VAC/ADMIN/RCVD: CPT | Performed by: ORTHOPAEDIC SURGERY

## 2022-02-09 NOTE — PROGRESS NOTES
Richie Bueno M.D.            [address] [facility] [address] [facility],[address], [address] [facility]           Dept Phone: [phone]           Dept Fax:  [phone] [address] [facility]           [place]          Dept Phone: [phone]           Dept Fax:  [phone]      Chief Compliant:  Chief Complaint   Patient presents with    Pain     Rt ASI 2/9/21        History of Present Illness: This is a 74 y.o. female who presents to the clinic today for evaluation / follow up of right total hip arthroplasty performed on 2/9/2021. She has no complaints whatsoever regarding right hip. She is also being presently treated for nondisplaced patellar fracture regarding her left total knee. .       Review of Systems   Constitutional: Negative for fever, chills, sweats. Eyes: Negative for changes in vision, or pain. HENT: Negative for ear ache, epistaxis, or sore throat. Respiratory/Cardio: Negative for Chest pain, palpitations, SOB, or cough. Gastrointestinal: Negative for abdominal pain, N/V/D. Genitourinary: Negative for dysuria, frequency, urgency, or hematuria. Neurological: Negative for headache, numbness, or weakness. Integumentary: Negative for rash, itching, laceration, or abrasion. Musculoskeletal: Positive for Pain (Rt ASI 2/9/21)       Physical Exam:  Constitutional: Patient is oriented to person, place, and time. Patient appears well-developed and well nourished. HENT: Negative otherwise noted  Head: Normocephalic and Atraumatic  Nose: Normal  Eyes: Conjunctivae and EOM are normal  Neck: Normal range of motion Neck supple. Respiratory/Cardio: Effort normal. No respiratory distress. Musculoskeletal: Lamination the patient's right hip notes she has no pain whatsoever on flexion internal extra rotation. She has excellent psoas strength. No trochanteric findings. Leg lengths were not established as she is wearing a hinged brace on the left side for her nondisplaced patellar fracture  Neurological: Patient is alert and oriented to person, place, and time. Normal strenght. No sensory deficit. Skin: Skin is warm and dry  Psychiatric: Behavior is normal. Thought content normal.  Nursing note and vitals reviewed. Labs and Imaging:     XR taken today:  XR PELVIS (1-2 VIEWS)    Result Date: 2/9/2022  Tracing today reviewed by me show standing AP of the pelvis. Patient status post right total knee arthroplasty. She has a acetabular screw which appears to be stable as does the stem. This was compared with views taken year ago no obvious changes appreciated. Orders Placed This Encounter   Procedures    XR PELVIS (1-2 VIEWS)     Standing Status:   Future     Number of Occurrences:   1     Standing Expiration Date:   2/8/2023       Assessment and Plan:  1. H/O total hip arthroplasty, right            This is a 74 y.o. female who presents to the clinic today for evaluation / follow up of exactly 1 year status post right total hip doing superbly.      Past History:    Current Outpatient Medications:     atorvastatin (LIPITOR) 20 MG tablet, Take 1 tablet by mouth daily, Disp: 90 tablet, Rfl: 3    pramipexole (MIRAPEX) 0.5 MG tablet, TAKE ONE TABLET BY MOUTH ONCE NIGHTLY, Disp: 90 tablet, Rfl: 3    citalopram (CELEXA) 20 MG tablet, TAKE ONE TABLET BY MOUTH ONCE DAILY, Disp: 90 tablet, Rfl: 3    meloxicam (MOBIC) 15 MG tablet, Take 1 tablet by mouth daily, Disp: 30 tablet, Rfl: 3    ondansetron (ZOFRAN-ODT) 4 MG disintegrating tablet, Take 1 tablet by mouth every 8 hours as needed for Nausea or Vomiting, Disp: 30 tablet, Rfl: 0    meloxicam (MOBIC) 15 MG tablet, Take 15 mg by mouth daily, Disp: , Rfl:     Magnesium 500 MG CAPS, Take 1 capsule by mouth daily, Disp: , Rfl:     melatonin 5 MG TABS tablet, Take 10 mg by mouth nightly as needed, Disp: , Rfl:    SUMAtriptan (IMITREX) 50 MG tablet, Take at onset of headache; may repeat in 2 hours; no more than 2 in 24 hours. , Disp: 9 tablet, Rfl: 11    CALCIUM CITRATE PO, Take 1,200 mg by mouth daily calciu, Disp: , Rfl:     Multiple Vitamins-Minerals (THERAPEUTIC MULTIVITAMIN-MINERALS) tablet, Take 1 tablet by mouth daily. , Disp: , Rfl:     omeprazole (PRILOSEC) 20 MG capsule, Take 20 mg by mouth daily. , Disp: , Rfl:   Allergies   Allergen Reactions    Darvon [Propoxyphene] Nausea And Vomiting and Other (See Comments)     Severe headaches    Morphine Nausea And Vomiting and Other (See Comments)     Severe headaches    Augmentin [Amoxicillin-Pot Clavulanate] Diarrhea and Nausea And Vomiting     Social History     Socioeconomic History    Marital status:      Spouse name: Not on file    Number of children: Not on file    Years of education: Not on file    Highest education level: Not on file   Occupational History    Not on file   Tobacco Use    Smoking status: Former Smoker     Packs/day: 0.50     Years: 3.00     Pack years: 1.50     Quit date:      Years since quittin.1    Smokeless tobacco: Never Used   Vaping Use    Vaping Use: Never used   Substance and Sexual Activity    Alcohol use: Yes     Comment: 1-2 per week    Drug use: No    Sexual activity: Not on file   Other Topics Concern    Not on file   Social History Narrative    Not on file     Social Determinants of Health     Financial Resource Strain:     Difficulty of Paying Living Expenses: Not on file   Food Insecurity:     Worried About Running Out of Food in the Last Year: Not on file    Roxanne of Food in the Last Year: Not on file   Transportation Needs:     Lack of Transportation (Medical): Not on file    Lack of Transportation (Non-Medical):  Not on file   Physical Activity:     Days of Exercise per Week: Not on file    Minutes of Exercise per Session: Not on file   Stress:     Feeling of Stress : Not on file Social Connections:     Frequency of Communication with Friends and Family: Not on file    Frequency of Social Gatherings with Friends and Family: Not on file    Attends Yarsanism Services: Not on file    Active Member of Clubs or Organizations: Not on file    Attends Club or Organization Meetings: Not on file    Marital Status: Not on file   Intimate Partner Violence:     Fear of Current or Ex-Partner: Not on file    Emotionally Abused: Not on file    Physically Abused: Not on file    Sexually Abused: Not on file   Housing Stability:     Unable to Pay for Housing in the Last Year: Not on file    Number of [place] in the Last Year: Not on file    Unstable Housing in the Last Year: Not on file     Past Medical History:   Diagnosis Date    Arthritis     Bone spur of left foot     BRCA1 negative     Breast cancer ([facility] [address] 1998    left with reconstruction, no chemo or radiation    Colon polyp     Degenerative joint disease of right hip     Depression     Diverticulosis     Elevated liver enzymes     GERD (gastroesophageal reflux disease)     Hemorrhoids     Hyperlipidemia     Liver hemangioma     Migraine headache     Osteopenia     Overweight (BMI 25.0-29.9) 05/11/2015    Restless legs syndrome (RLS)     on Mirapex    Tubular adenoma      Past Surgical History:   Procedure Laterality Date    BREAST BIOPSY Left     cancer found    BREAST RECONSTRUCTION Left     using abdominal tissue    CATARACT REMOVAL WITH IMPLANT Bilateral     COLONOSCOPY      COLONOSCOPY  2 10 15    DIVERTICULOSIS AND HEMORRHOIDS , TUBULAR ADENOMA     COLONOSCOPY N/A 2/21/2018    COLONOSCOPY WITH BIOPSY performed by Spring Stevenson MD at [place] [address] N/A 7/29/2021    COLONOSCOPY DIAGNOSTIC performed by Spring Stevenson MD at [address] [place] [address], [place], DIAGNOSTIC      EYE SURGERY      MASTECTOMY      MASTECTOMY Left     remainder of breast was removed because the margins were not clear on the partial mastectomy    MASTECTOMY, PARTIAL Left     for cancer    MI REMOVAL OF HEEL SPUR Left 11/5/2018    EXOSTECTOMY DORSAL FOOT performed by Dmitri Brownlee DPM at [address]      L3,4,5, fusion    TOTAL HIP ARTHROPLASTY Right 2/9/2021    HIP TOTAL ARTHROPLASTY ASI performed by Maryuri Tirado MD at [address] Left 5/21/2019    KNEE TOTAL ARTHROPLASTY performed by Maryuri Tirado MD at [address] ENDOSCOPY N/A 7/29/2021    EGD BIOPSY OF STOMACH AND ESOPHAGEAL ULCER performed by Jericho Avilez MD at [facility]     Family History   Problem Relation Age of Onset    Ovarian Cancer Mother     Other Daughter         Wegener's disease    Heart Attack Father     Breast Cancer Maternal Grandmother    Plan  Patient is very very well with her right hip. She can come back in 2 years to that. I am seeing the patient back in the next 10 days for repeat x-rays of her left knee as she has a nondisplaced patellar fracture about a total knee    Provider Attestation:  Aiden Ballard, personally performed the services described in this documentation. All medical record entries made by the scribe were at my direction and in my presence. I have reviewed the chart and discharge instructions and agree that the records reflect my personal performance and is accurate and complete. Maryuri Tirado MD. 02/09/22      Please note that this chart was generated using voice recognition Dragon dictation software. Although every effort was made to ensure the accuracy of this automated transcription, some errors in transcription may have occurred.

## 2022-02-16 ENCOUNTER — OFFICE VISIT (OUTPATIENT)
Dept: ORTHOPEDIC SURGERY | Age: 72
End: 2022-02-16

## 2022-02-16 DIAGNOSIS — M25.562 LEFT KNEE PAIN, UNSPECIFIED CHRONICITY: Primary | ICD-10-CM

## 2022-02-16 PROCEDURE — 99024 POSTOP FOLLOW-UP VISIT: CPT | Performed by: ORTHOPAEDIC SURGERY

## 2022-02-16 NOTE — PROGRESS NOTES
Bethel Ramos M.D.            [address] [place] [address]., [address] [facility], [address] [place] [address]           Dept Phone: [phone]           Dept Fax:  [phone]           [place]          Dept Phone: [phone]           Dept Fax:  [phone]      Chief Compliant:  Chief Complaint   Patient presents with    Pain     RT ASI 2/9/21        History of Present Illness: This is a 74 y.o. female who presents to the clinic today for evaluation / follow up of nondisplaced left patella fracture status post previous total knee. Patient has no complaints. She says she has no pain when she to get out of the brace. Review of Systems   Constitutional: Negative for fever, chills, sweats. Eyes: Negative for changes in vision, or pain. HENT: Negative for ear ache, epistaxis, or sore throat. Respiratory/Cardio: Negative for Chest pain, palpitations, SOB, or cough. Gastrointestinal: Negative for abdominal pain, N/V/D. Genitourinary: Negative for dysuria, frequency, urgency, or hematuria. Neurological: Negative for headache, numbness, or weakness. Integumentary: Negative for rash, itching, laceration, or abrasion. Musculoskeletal: Positive for Pain (RT ASI 2/9/21)       Physical Exam:  Constitutional: Patient is oriented to person, place, and time. Patient appears well-developed and well nourished. HENT: Negative otherwise noted  Head: Normocephalic and Atraumatic  Nose: Normal  Eyes: Conjunctivae and EOM are normal  Neck: Normal range of motion Neck supple. Respiratory/Cardio: Effort normal. No respiratory distress. Musculoskeletal: Physical examination out of brace notes that I can passively move her knee 0 to greater than 100 degrees without any pain. She has no tenderness with direct palpation.   She can actively extend her knee from 90 degrees of flexion to full extension both with and without resistance. No other contributory findings  Neurological: Patient is alert and oriented to person, place, and time. Normal strenght. No sensory deficit. Skin: Skin is warm and dry  Psychiatric: Behavior is normal. Thought content normal.  Nursing note and vitals reviewed. Labs and Imaging:     XR taken today:  XR KNEE LEFT (1-2 VIEWS)    Result Date: 2/16/2022  X-rays taken today reviewed by me show AP lateral views the patient's left knee. Patient is status post previous left total knee arthroplasty. She is noted to have          Orders Placed This Encounter   Procedures    XR KNEE LEFT (1-2 VIEWS)     Standing Status:   Future     Number of Occurrences:   1     Standing Expiration Date:   2/15/2023       Assessment and Plan:  1. Left knee pain, unspecified chronicity            This is a 74 y.o. female who presents to the clinic today for evaluation / follow up of status post nondisplaced left patella fracture with previous total knee arthroplasty left knee.      Past History:    Current Outpatient Medications:     atorvastatin (LIPITOR) 20 MG tablet, Take 1 tablet by mouth daily, Disp: 90 tablet, Rfl: 3    pramipexole (MIRAPEX) 0.5 MG tablet, TAKE ONE TABLET BY MOUTH ONCE NIGHTLY, Disp: 90 tablet, Rfl: 3    citalopram (CELEXA) 20 MG tablet, TAKE ONE TABLET BY MOUTH ONCE DAILY, Disp: 90 tablet, Rfl: 3    meloxicam (MOBIC) 15 MG tablet, Take 1 tablet by mouth daily, Disp: 30 tablet, Rfl: 3    ondansetron (ZOFRAN-ODT) 4 MG disintegrating tablet, Take 1 tablet by mouth every 8 hours as needed for Nausea or Vomiting, Disp: 30 tablet, Rfl: 0    meloxicam (MOBIC) 15 MG tablet, Take 15 mg by mouth daily, Disp: , Rfl:     Magnesium 500 MG CAPS, Take 1 capsule by mouth daily, Disp: , Rfl:     melatonin 5 MG TABS tablet, Take 10 mg by mouth nightly as needed, Disp: , Rfl:     SUMAtriptan (IMITREX) 50 MG tablet, Take at onset of headache; may repeat in 2 hours; no more than 2 in 24 hours. , Disp: 9 tablet, Rfl: 11    CALCIUM CITRATE PO, Take 1,200 mg by mouth daily calciu, Disp: , Rfl:     Multiple Vitamins-Minerals (THERAPEUTIC MULTIVITAMIN-MINERALS) tablet, Take 1 tablet by mouth daily. , Disp: , Rfl:     omeprazole (PRILOSEC) 20 MG capsule, Take 20 mg by mouth daily. , Disp: , Rfl:   Allergies   Allergen Reactions    Darvon [Propoxyphene] Nausea And Vomiting and Other (See Comments)     Severe headaches    Morphine Nausea And Vomiting and Other (See Comments)     Severe headaches    Augmentin [Amoxicillin-Pot Clavulanate] Diarrhea and Nausea And Vomiting     Social History     Socioeconomic History    Marital status:      Spouse name: Not on file    Number of children: Not on file    Years of education: Not on file    Highest education level: Not on file   Occupational History    Not on file   Tobacco Use    Smoking status: Former Smoker     Packs/day: 0.50     Years: 3.00     Pack years: 1.50     Quit date:      Years since quittin.1    Smokeless tobacco: Never Used   Vaping Use    Vaping Use: Never used   Substance and Sexual Activity    Alcohol use: Yes     Comment: 1-2 per week    Drug use: No    Sexual activity: Not on file   Other Topics Concern    Not on file   Social History Narrative    Not on file     Social Determinants of Health     Financial Resource Strain:     Difficulty of Paying Living Expenses: Not on file   Food Insecurity:     Worried About Running Out of Food in the Last Year: Not on file    Roxanne of Food in the Last Year: Not on file   Transportation Needs:     Lack of Transportation (Medical): Not on file    Lack of Transportation (Non-Medical):  Not on file   Physical Activity:     Days of Exercise per Week: Not on file    Minutes of Exercise per Session: Not on file   Stress:     Feeling of Stress : Not on file   Social Connections:     Frequency of Communication with Friends and Family: Not on file  Frequency of Social Gatherings with Friends and Family: Not on file    Attends Anglican Services: Not on file    Active Member of Clubs or Organizations: Not on file    Attends Club or Organization Meetings: Not on file    Marital Status: Not on file   Intimate Partner Violence:     Fear of Current or Ex-Partner: Not on file    Emotionally Abused: Not on file    Physically Abused: Not on file    Sexually Abused: Not on file   Housing Stability:     Unable to Pay for Housing in the Last Year: Not on file    Number of [place] in the Last Year: Not on file    Unstable Housing in the Last Year: Not on file     Past Medical History:   Diagnosis Date    Arthritis     Bone spur of left foot     BRCA1 negative     Breast cancer ([facility] [address] 1998    left with reconstruction, no chemo or radiation    Colon polyp     Degenerative joint disease of right hip     Depression     Diverticulosis     Elevated liver enzymes     GERD (gastroesophageal reflux disease)     Hemorrhoids     Hyperlipidemia     Liver hemangioma     Migraine headache     Osteopenia     Overweight (BMI 25.0-29.9) 05/11/2015    Restless legs syndrome (RLS)     on Mirapex    Tubular adenoma      Past Surgical History:   Procedure Laterality Date    BREAST BIOPSY Left     cancer found    BREAST RECONSTRUCTION Left     using abdominal tissue    CATARACT REMOVAL WITH IMPLANT Bilateral     COLONOSCOPY      COLONOSCOPY  2 10 15    DIVERTICULOSIS AND HEMORRHOIDS , TUBULAR ADENOMA     COLONOSCOPY N/A 2/21/2018    COLONOSCOPY WITH BIOPSY performed by Syl Duong MD at [facility] COLONOSCOPY N/A 7/29/2021    COLONOSCOPY DIAGNOSTIC performed by Syl Duong MD at [address] [place] [address], [place], DIAGNOSTIC      EYE SURGERY      MASTECTOMY      MASTECTOMY Left     remainder of breast was removed because the margins were not clear on the partial mastectomy    MASTECTOMY, PARTIAL Left     for cancer    DC REMOVAL OF HEEL SPUR Left 11/5/2018    EXOSTECTOMY DORSAL FOOT performed by [place] Mcne[place] DPM at [address]      L3,4,5, fusion    TOTAL HIP ARTHROPLASTY Right 2/9/2021    HIP TOTAL ARTHROPLASTY ASI performed by Caryn Anthony MD at [address] Left 5/21/2019    KNEE TOTAL ARTHROPLASTY performed by Caryn Anthony MD at [facility] ENDOSCOPY N/A 7/29/2021    EGD BIOPSY OF STOMACH AND ESOPHAGEAL ULCER performed by Tonja Manjarrez MD at [facility]     Family History   Problem Relation Age of Onset    Ovarian Cancer Mother     Other Daughter         Wegener's disease    Heart Attack Father     Breast Cancer Maternal Grandmother    Plan  Patient may remove the brace. She would prefer to do home exercises. She does have some bands at home and I showed her how that she could utilize these to help working on her quad strength. Told also to avoid snow and ice for the next remainder of the winter season. We will see her back here otherwise annual basis for her total hip and total knee follow-up      Provider Attestation:  Pablo Lopez, personally performed the services described in this documentation. All medical record entries made by the scribe were at my direction and in my presence. I have reviewed the chart and discharge instructions and agree that the records reflect my personal performance and is accurate and complete. Caryn Anthony MD. 02/16/22      Please note that this chart was generated using voice recognition Dragon dictation software. Although every effort was made to ensure the accuracy of this automated transcription, some errors in transcription may have occurred.

## 2022-02-18 RX ORDER — MELOXICAM 15 MG/1
15 TABLET ORAL DAILY
Qty: 90 TABLET | Refills: 0 | OUTPATIENT
Start: 2022-02-18

## 2022-02-18 RX ORDER — MELOXICAM 15 MG/1
15 TABLET ORAL DAILY
Qty: 30 TABLET | Refills: 3 | Status: SHIPPED | OUTPATIENT
Start: 2022-02-18 | End: 2022-06-14 | Stop reason: SDUPTHER

## 2022-03-21 ENCOUNTER — OFFICE VISIT (OUTPATIENT)
Dept: ORTHOPEDIC SURGERY | Age: 72
End: 2022-03-21
Payer: MEDICARE

## 2022-03-21 VITALS — RESPIRATION RATE: 12 BRPM | BODY MASS INDEX: 26.58 KG/M2 | HEIGHT: 63 IN | WEIGHT: 150 LBS

## 2022-03-21 DIAGNOSIS — Z96.652 HISTORY OF TOTAL LEFT KNEE REPLACEMENT: Primary | ICD-10-CM

## 2022-03-21 DIAGNOSIS — S82.092G: ICD-10-CM

## 2022-03-21 PROCEDURE — 1036F TOBACCO NON-USER: CPT | Performed by: PHYSICIAN ASSISTANT

## 2022-03-21 PROCEDURE — 3017F COLORECTAL CA SCREEN DOC REV: CPT | Performed by: PHYSICIAN ASSISTANT

## 2022-03-21 PROCEDURE — G8484 FLU IMMUNIZE NO ADMIN: HCPCS | Performed by: PHYSICIAN ASSISTANT

## 2022-03-21 PROCEDURE — G8400 PT W/DXA NO RESULTS DOC: HCPCS | Performed by: PHYSICIAN ASSISTANT

## 2022-03-21 PROCEDURE — G8417 CALC BMI ABV UP PARAM F/U: HCPCS | Performed by: PHYSICIAN ASSISTANT

## 2022-03-21 PROCEDURE — 1090F PRES/ABSN URINE INCON ASSESS: CPT | Performed by: PHYSICIAN ASSISTANT

## 2022-03-21 PROCEDURE — G8427 DOCREV CUR MEDS BY ELIG CLIN: HCPCS | Performed by: PHYSICIAN ASSISTANT

## 2022-03-21 PROCEDURE — 4040F PNEUMOC VAC/ADMIN/RCVD: CPT | Performed by: PHYSICIAN ASSISTANT

## 2022-03-21 PROCEDURE — 1123F ACP DISCUSS/DSCN MKR DOCD: CPT | Performed by: PHYSICIAN ASSISTANT

## 2022-03-21 PROCEDURE — 99214 OFFICE O/P EST MOD 30 MIN: CPT | Performed by: PHYSICIAN ASSISTANT

## 2022-03-21 NOTE — PROGRESS NOTES
[facility], [address] [place], [address] [place] [address], [id number] [place] [address]           Dept Phone: [phone]           Dept Fax:  [phone] [facility], [place]          Dept Phone: [phone]           Dept Fax:  [phone]      Chief Compliant:  Chief Complaint   Patient presents with    Knee Pain     left knee pain        History of Present Illness:  Devang Show returns today. This is a 74 y.o. female who presents to the clinic today for follow up of left patellar fracture history of left total knee arthroplasty. Initial date of injury occurred at the end of December patient was seen by Dr. Ramo Jones on 1/5/2022 where x-rays were obtained revealing a nondisplaced fracture of the patella. Patient was placed in a hinged knee brace locked in extension at that time. She was seen several times for follow-up with gradual increase flexion allowance with the knee brace. At last appointment on 2/16/2022 with Dr. Ramo Jones patient demonstrated clinical union with evidence of radiographic healing and no tenderness on examination. Patient reports that she was discontinued from the brace and was doing very well for the last 4 weeks however just this past Thursday she started to have new onset left knee pain specifically to the anterior medial aspect of the knee. She denies any fall or trauma prior to the onset of this increased pain. Patient has remained ambulatory and reports that she is having very little pain with walking but does have quite a bit of discomfort while sitting and notes quite a bit of stiffness in the knee as well. Review of Systems   Constitutional: Negative for fever, chills, sweats, recent illness, or recent injury. Neurological: Negative for headaches, numbness, or weakness.    Integumentary: Negative for rash, itching, ecchymosis, abrasions, or laceration. Musculoskeletal: Positive for Knee Pain (left knee pain)       Physical Exam:  Constitutional: Patient is oriented to person, place, and time. Patient appears well-developed and well nourished. Musculoskeletal:    Left Knee    Gait:  Normal- not using any assistive devices or braces   Incision:  Well healed without any incisional erythema. Tenderness: Moderate tenderness to the anterior knee specifically to the superior patella. Moderate tenderness to the Pez anserine bursa and medial joint line as well. Flexion ROM:  95   Extension ROM:  -5 active extension is intact lacks about 5 degrees actively 0 degrees of extension is achieved passively. Effusion:  mild   DVT Evaluation:  No evidence of DVT seen on physical exam.       Neurological: Patient is alert and oriented to person, place, and time. Normal strenght. No sensory deficit. Skin: Skin is warm and dry  Psychiatric: Behavior is normal. Thought content normal.  Nursing note and vitals reviewed. Labs and Imaging:     XR taken today:  No results found. X-rays taken in clinic and preliminarily reviewed by me 3/21/22:   2 views of the left knee AP and lateral demonstrate patient status post left total knee arthroplasty. Femoral and tibial components appear in excellent position without evidence of hardware complication. Again demonstrate the transverse patella fracture seen on lateral view which does appear to have some slight increase widening of the fracture line compared with last x-rays on 2/16/2022. Assessment and Plan:  1. History of total left knee replacement    2. Closed sleeve fracture of left patella with delayed healing, subsequent encounter              PLAN:  This is a 74 y.o. female who presents to the clinic today for follow up left patellar fracture history of left total knee arthroplasty. Initial date of injury occurred at the end of December 2021.   Patient was treated conservatively with hinged knee bracing with gradual opening up to allow more more flexion. Patient bracing was discontinued on 2/16/2021 reports she is doing excellent until this past week where she had a sudden onset of increased pain. 1.  Radiographically today there does appear to be increased gapping of the previously demonstrated patellar fracture line. Discussed case with Dr. Jose Antonio Leger was available for review of these images recommended patient be placed back in a hinged knee brace allowing 0 to about 60 degrees of range of motion. Patient does present with knee brace in place and is instructed on these and is agreeable to plan. 2.  Also discussed with patient that I recommend follow-up in 3 weeks for reevaluation with Dr. Jose Antonio Leger and repeat x-rays. 3.  We did discuss with patient that although it does demonstrate some increased widening of the fracture line that it is still within acceptable limits of conservative management that if we can maintain this alignment patient will likely do well nonoperatively. 4.  Follow-up in 3 weeks however patient may call return sooner for any questions or concerns    Electronically signed by IRVING Luna on 3/21/2022 at 1:48 PM      Please note that this chart was generated using voice recognition Dragon dictation software. Although every effort was made to ensure the accuracy of this automated transcription, some errors in transcription may have occurred.

## 2022-03-29 ENCOUNTER — OFFICE VISIT (OUTPATIENT)
Dept: GASTROENTEROLOGY | Age: 72
End: 2022-03-29
Payer: MEDICARE

## 2022-03-29 VITALS
WEIGHT: 157 LBS | DIASTOLIC BLOOD PRESSURE: 91 MMHG | BODY MASS INDEX: 27.81 KG/M2 | TEMPERATURE: 97.9 F | HEART RATE: 74 BPM | SYSTOLIC BLOOD PRESSURE: 187 MMHG

## 2022-03-29 DIAGNOSIS — K21.9 GASTROESOPHAGEAL REFLUX DISEASE WITHOUT ESOPHAGITIS: ICD-10-CM

## 2022-03-29 DIAGNOSIS — K57.90 DIVERTICULOSIS: ICD-10-CM

## 2022-03-29 DIAGNOSIS — D18.03 LIVER HEMANGIOMA: ICD-10-CM

## 2022-03-29 DIAGNOSIS — R19.7 DIARRHEA, UNSPECIFIED TYPE: Primary | ICD-10-CM

## 2022-03-29 DIAGNOSIS — D36.9 ADENOMATOUS POLYPS: ICD-10-CM

## 2022-03-29 PROCEDURE — 4040F PNEUMOC VAC/ADMIN/RCVD: CPT | Performed by: INTERNAL MEDICINE

## 2022-03-29 PROCEDURE — G8427 DOCREV CUR MEDS BY ELIG CLIN: HCPCS | Performed by: INTERNAL MEDICINE

## 2022-03-29 PROCEDURE — G8400 PT W/DXA NO RESULTS DOC: HCPCS | Performed by: INTERNAL MEDICINE

## 2022-03-29 PROCEDURE — 1090F PRES/ABSN URINE INCON ASSESS: CPT | Performed by: INTERNAL MEDICINE

## 2022-03-29 PROCEDURE — 1123F ACP DISCUSS/DSCN MKR DOCD: CPT | Performed by: INTERNAL MEDICINE

## 2022-03-29 PROCEDURE — 1036F TOBACCO NON-USER: CPT | Performed by: INTERNAL MEDICINE

## 2022-03-29 PROCEDURE — 99213 OFFICE O/P EST LOW 20 MIN: CPT | Performed by: INTERNAL MEDICINE

## 2022-03-29 PROCEDURE — G8484 FLU IMMUNIZE NO ADMIN: HCPCS | Performed by: INTERNAL MEDICINE

## 2022-03-29 PROCEDURE — G8417 CALC BMI ABV UP PARAM F/U: HCPCS | Performed by: INTERNAL MEDICINE

## 2022-03-29 PROCEDURE — 3017F COLORECTAL CA SCREEN DOC REV: CPT | Performed by: INTERNAL MEDICINE

## 2022-03-29 ASSESSMENT — ENCOUNTER SYMPTOMS
NAUSEA: 0
COUGH: 0
CHOKING: 0
TROUBLE SWALLOWING: 0
RECTAL PAIN: 0
VOMITING: 0
DIARRHEA: 0
SHORTNESS OF BREATH: 0
ABDOMINAL DISTENTION: 0
BLOOD IN STOOL: 0
ANAL BLEEDING: 0
WHEEZING: 0
ABDOMINAL PAIN: 0
BACK PAIN: 0
CONSTIPATION: 0

## 2022-03-29 NOTE — PROGRESS NOTES
GI CLINIC FOLLOW UP    INTERVAL HISTORY:   No referring provider defined for this encounter. Chief Complaint   Patient presents with    Gastroesophageal Reflux     Patient is f/u on GERD. She states she is doing well on Prilosec daily           HISTORY OF PRESENT ILLNESS: Winifred Beard [facility] Yue Rosales is a 74 y.o. female , referred for evaluation of*GERD, diverticulosis, elevated LFTS, liver hemangioma, colon polyps, diarrhea   ' here for f/u    gerd is controled    diarrhea is still   2-3 x day   no blood no cramps   No weight loss no fever no chills  We have done  colonoscopy 2018 and 2021       bx negative for [facility]       On prilosec    on equate and controles it       Labs 1/21 : normal cbc /lfts     Past Medical,Family, and Social History reviewed and does contribute to the patient presentingcondition. Patient's PMH/PSH,SH,PSYCH Hx, MEDs, ALLERGIES, and ROS were all reviewed and updated in the appropriate sections.     PAST MEDICAL HISTORY:  Past Medical History:   Diagnosis Date    Arthritis     Bone spur of left foot     BRCA1 negative     Breast cancer ([facility] [address] 1998    left with reconstruction, no chemo or radiation    Colon polyp     Degenerative joint disease of right hip     Depression     Diverticulosis     Elevated liver enzymes     GERD (gastroesophageal reflux disease)     Hemorrhoids     Hyperlipidemia     Liver hemangioma     Migraine headache     Osteopenia     Overweight (BMI 25.0-29.9) 05/11/2015    Restless legs syndrome (RLS)     on Mirapex    Tubular adenoma        Past Surgical History:   Procedure Laterality Date    BREAST BIOPSY Left     cancer found    BREAST RECONSTRUCTION Left     using abdominal tissue    CATARACT REMOVAL WITH IMPLANT Bilateral     COLONOSCOPY      COLONOSCOPY  2 10 15    DIVERTICULOSIS AND HEMORRHOIDS , TUBULAR ADENOMA     COLONOSCOPY N/A 2/21/2018    COLONOSCOPY WITH BIOPSY performed by Linda Parks MD at [address] [facility] [address] N/A 7/29/2021 COLONOSCOPY DIAGNOSTIC performed by Carola Freedman MD at [address], COLON, DIAGNOSTIC      EYE SURGERY      MASTECTOMY      MASTECTOMY Left     remainder of breast was removed because the margins were not clear on the partial mastectomy    MASTECTOMY, PARTIAL Left     for cancer    CA REMOVAL OF HEEL SPUR Left 11/5/2018    EXOSTECTOMY DORSAL FOOT performed by Estefania Marino DPM at [address]      L3,4,5, fusion    TOTAL HIP ARTHROPLASTY Right 2/9/2021    HIP TOTAL ARTHROPLASTY ASI performed by Andra Li MD at [address] [facility] Left 5/21/2019    KNEE TOTAL ARTHROPLASTY performed by Andra Li MD at [address] [facility] ENDOSCOPY N/A 7/29/2021    EGD BIOPSY OF STOMACH AND ESOPHAGEAL ULCER performed by Carola Freedman MD at [address]:    Current Outpatient Medications:     meloxicam (MOBIC) 15 MG tablet, Take 1 tablet by mouth daily, Disp: 30 tablet, Rfl: 3    SUMAtriptan (IMITREX) 50 MG tablet, Take at onset of headache; may repeat in 2 hours; no more than 2 in 24 hours. , Disp: 9 tablet, Rfl: 11    atorvastatin (LIPITOR) 20 MG tablet, Take 1 tablet by mouth daily, Disp: 90 tablet, Rfl: 3    pramipexole (MIRAPEX) 0.5 MG tablet, TAKE ONE TABLET BY MOUTH ONCE NIGHTLY, Disp: 90 tablet, Rfl: 3    citalopram (CELEXA) 20 MG tablet, TAKE ONE TABLET BY MOUTH ONCE DAILY, Disp: 90 tablet, Rfl: 3    ondansetron (ZOFRAN-ODT) 4 MG disintegrating tablet, Take 1 tablet by mouth every 8 hours as needed for Nausea or Vomiting, Disp: 30 tablet, Rfl: 0    Magnesium 500 MG CAPS, Take 1 capsule by mouth daily, Disp: , Rfl:     melatonin 5 MG TABS tablet, Take 10 mg by mouth nightly as needed, Disp: , Rfl:     CALCIUM CITRATE PO, Take 1,200 mg by mouth daily calciu, Disp: , Rfl:     Multiple Vitamins-Minerals (THERAPEUTIC MULTIVITAMIN-MINERALS) tablet, Take 1 tablet by mouth daily. , Disp: , Rfl:     omeprazole (PRILOSEC) 20 MG capsule, Take 20 mg by mouth daily. , Disp: , Rfl:     ALLERGIES:   Allergies   Allergen Reactions    Darvon [Propoxyphene] Nausea And Vomiting and Other (See Comments)     Severe headaches    Morphine Nausea And Vomiting and Other (See Comments)     Severe headaches    Augmentin [Amoxicillin-Pot Clavulanate] Diarrhea and Nausea And Vomiting       FAMILY HISTORY:       Problem Relation Age of Onset    Ovarian Cancer Mother     Other Daughter         Wegener's disease    Heart Attack Father     Breast Cancer Maternal Grandmother          SOCIAL HISTORY:   Social History     Socioeconomic History    Marital status:      Spouse name: Not on file    Number of children: Not on file    Years of education: Not on file    Highest education level: Not on file   Occupational History    Not on file   Tobacco Use    Smoking status: Former Smoker     Packs/day: 0.50     Years: 3.00     Pack years: 1.50     Quit date:      Years since quittin.2    Smokeless tobacco: Never Used   Vaping Use    Vaping Use: Never used   Substance and Sexual Activity    Alcohol use: Yes     Comment: 1-2 per week    Drug use: No    Sexual activity: Not on file   Other Topics Concern    Not on file   Social History Narrative    Not on file     Social Determinants of Health     Financial Resource Strain:     Difficulty of Paying Living Expenses: Not on file   Food Insecurity:     Worried About Running Out of Food in the Last Year: Not on file    Roxanne of Food in the Last Year: Not on file   Transportation Needs:     Lack of Transportation (Medical): Not on file    Lack of Transportation (Non-Medical):  Not on file   Physical Activity:     Days of Exercise per Week: Not on file    Minutes of Exercise per Session: Not on file   Stress:     Feeling of Stress : Not on file   Social Connections:     Frequency of Communication with Friends and Family: Not on file    Frequency of Social Gatherings with Friends and Family: Not on file    Attends [employer] Services: Not on file    Active Member of Clubs or Organizations: Not on file    Attends Club or Organization Meetings: Not on file    Marital Status: Not on file   Intimate Partner Violence:     Fear of Current or Ex-Partner: Not on file    Emotionally Abused: Not on file    Physically Abused: Not on file    Sexually Abused: Not on file   Housing Stability:     Unable to Pay for Housing in the Last Year: Not on file    Number of [place] in the Last Year: Not on file    Unstable Housing in the Last Year: Not on file       REVIEW OF SYSTEMS: A 12-point review of systemswas obtained and pertinent positives and negatives were enumerated above in the history of present illness. All other reviewed systems / symptoms were negative. Review of Systems   Constitutional: Positive for fatigue. Negative for appetite change and unexpected weight change. HENT: Negative for trouble swallowing. Eyes: Negative for visual disturbance (glasses). Respiratory: Negative for cough, choking, shortness of breath and wheezing. Cardiovascular: Negative for chest pain, palpitations and leg swelling. Gastrointestinal: Negative for abdominal distention, abdominal pain, anal bleeding, blood in stool, constipation, diarrhea, nausea, rectal pain and vomiting. Genitourinary: Negative for difficulty urinating. Musculoskeletal: Negative for back pain and joint swelling. Allergic/Immunologic: Negative for environmental allergies and food allergies. Neurological: Negative for dizziness, weakness, light-headedness, numbness and headaches. Hematological: Bruises/bleeds easily. Psychiatric/Behavioral: Negative for sleep disturbance. The patient is not nervous/anxious.             LABORATORY DATA: Reviewed  Lab Results   Component Value Date    WBC 8.4 01/26/2021    HGB 13.6 01/26/2021    HCT 41.7 01/26/2021    MCV 90.8 01/26/2021     01/26/2021     01/26/2021    K 4.2 01/26/2021     01/26/2021    CO2 23 01/26/2021    BUN 11 01/26/2021    CREATININE 0.74 01/26/2021    LABPROT 6.9 06/05/2012    LABALBU 4.2 07/01/2020    BILITOT 0.38 07/01/2020    ALKPHOS 86 07/01/2020    AST 18 07/01/2020    ALT 28 07/01/2020         Lab Results   Component Value Date    RBC 4.59 01/26/2021    HGB 13.6 01/26/2021    MCV 90.8 01/26/2021    MCH 29.7 01/26/2021    MCHC 32.7 01/26/2021    RDW 14.4 01/26/2021    MPV 8.2 01/26/2021    BASOPCT 0 01/26/2021    LYMPHSABS 2.90 01/26/2021    MONOSABS 0.80 01/26/2021    NEUTROABS 4.70 01/26/2021    EOSABS 0.00 01/26/2021    BASOSABS 0.00 01/26/2021         DIAGNOSTIC TESTING:     XR KNEE LEFT (1-2 VIEWS)    Result Date: 3/21/2022  X-rays taken in clinic and preliminarily reviewed by me 3/21/22: 2 views of the left knee AP and lateral demonstrate patient status post left total knee arthroplasty. Femoral and tibial components appear in excellent position without evidence of hardware complication. Again demonstrate the transverse patella fracture seen on lateral view which does appear to have some slight increase widening of the fracture line compared with last x-rays on 2/16/2022. PHYSICAL EXAMINATION: Vital signs reviewed per the nursing documentation. BP (!) 187/91   Pulse 74   Temp 97.9 °F (36.6 °C)   Wt 157 lb (71.2 kg)   BMI 27.81 kg/m²   Body mass index is 27.81 kg/m². Physical Exam  Vitals and nursing note reviewed. Constitutional:       General: She is not in acute distress. Appearance: She is well-developed. She is not diaphoretic. HENT:      Head: Normocephalic. Mouth/Throat:      Pharynx: No oropharyngeal exudate. Eyes:      General: No scleral icterus. Pupils: Pupils are equal, round, and reactive to light. Neck:      Thyroid: No thyromegaly. Vascular: No JVD. Trachea: No tracheal deviation.    Cardiovascular:      Rate and Rhythm: Normal rate and regular rhythm. Heart sounds: Normal heart sounds. No murmur heard. Pulmonary:      Effort: Pulmonary effort is normal. No respiratory distress. Breath sounds: Normal breath sounds. No wheezing. Abdominal:      General: Bowel sounds are normal. There is no distension. Palpations: Abdomen is soft. Tenderness: There is no abdominal tenderness. There is no guarding or rebound. Comments: No ascites   Musculoskeletal:         General: Normal range of motion. Cervical back: Normal range of motion and neck supple. Skin:     General: Skin is warm. Coloration: Skin is not pale. Findings: No erythema or rash. Comments: She is not diaphoretic   Neurological:      Mental Status: She is alert and oriented to person, place, and time. Deep Tendon Reflexes: Reflexes are normal and symmetric. Psychiatric:         Behavior: Behavior normal.         Thought Content: Thought content normal.         Judgment: Judgment normal.           IMPRESSION: Ms. Salena Silva is a 74 y.o. female with      Diagnosis Orders   1. Diarrhea, unspecified type     2. Adenomatous polyps     3. Gastroesophageal reflux disease without esophagitis     4. Liver hemangioma     5. Diverticulosis     Symptomatic control as stated in the HPI we will continue with the Prilosec we will continue with Zofran as needed and she will continue with antidiarrhea medication over-the-counter    Diet/life style/natural hx /complication of the dx were all explained in details   Past medical, past surgical, social history, psychiatric history, medications or allergies, all reviewed and  updated    Thank you for allowing me to participate in the care of Ms. Reina. For any further questions please do not hesitate to contact me. I have reviewed and agree with the ROS entered by the MA/RN. Note is dictated utilizing voice recognition software.  Unfortunately this leads to occasional typographical errors. Please contact our office if you have any questions.       John Mott MD  [facility]  O: #[phone]

## 2022-04-11 ENCOUNTER — OFFICE VISIT (OUTPATIENT)
Dept: ORTHOPEDIC SURGERY | Age: 72
End: 2022-04-11

## 2022-04-11 DIAGNOSIS — S82.092G: Primary | ICD-10-CM

## 2022-04-11 PROCEDURE — 99024 POSTOP FOLLOW-UP VISIT: CPT | Performed by: ORTHOPAEDIC SURGERY

## 2022-04-11 NOTE — PROGRESS NOTES
Daniela Whipple M.D.            [facility] [address] [facility],[address], [address] [facility]           Dept Phone: [phone]           Dept Fax:  [phone]           [place]          Dept Phone: [phone]           Dept Fax:  [phone]      Chief Compliant:  Chief Complaint   Patient presents with    Fracture     Lt patella        History of Present Illness: This is a 74 y.o. female who presents to the clinic today for evaluation / follow up of left knee nondisplaced patellar avulsion fracture. Patient states that she has minimal to no pain. She is hoping to get rid of the brace today. Review of Systems   Constitutional: Negative for fever, chills, sweats. Eyes: Negative for changes in vision, or pain. HENT: Negative for ear ache, epistaxis, or sore throat. Respiratory/Cardio: Negative for Chest pain, palpitations, SOB, or cough. Gastrointestinal: Negative for abdominal pain, N/V/D. Genitourinary: Negative for dysuria, frequency, urgency, or hematuria. Neurological: Negative for headache, numbness, or weakness. Integumentary: Negative for rash, itching, laceration, or abrasion. Musculoskeletal: Positive for Fracture (Lt patella)       Physical Exam:  Constitutional: Patient is oriented to person, place, and time. Patient appears well-developed and well nourished. HENT: Negative otherwise noted  Head: Normocephalic and Atraumatic  Nose: Normal  Eyes: Conjunctivae and EOM are normal  Neck: Normal range of motion Neck supple. Respiratory/Cardio: Effort normal. No respiratory distress. Musculoskeletal: Physical examination no second passively move the patient's knee from 0 to least 100 degrees.   Active motion she can have get to full extension and she has decent strength with extending from from 45 degrees to full 0 and 90 degrees up to full 0. She has no other contributory findings. Neurological: Patient is alert and oriented to person, place, and time. Normal strenght. No sensory deficit. Skin: Skin is warm and dry  Psychiatric: Behavior is normal. Thought content normal.  Nursing note and vitals reviewed. Labs and Imaging:     XR taken today:  XR KNEE LEFT (1-2 VIEWS)    Result Date: 4/11/2022  X-rays taken today reviewed by me show AP lateral views the patient's left knee. Patient has a total knee arthroplasty in place. Patient has a slightly avulsed fracture of the superior pole of patella. There was minimal root change from the initial x-ray to once taken in March. There is been no changes x-rays taken in March. Orders Placed This Encounter   Procedures    XR KNEE LEFT (1-2 VIEWS)     Standing Status:   Future     Number of Occurrences:   1     Standing Expiration Date:   4/7/2023       Assessment and Plan:  1. Closed sleeve fracture of left patella with delayed healing, subsequent encounter            This is a 74 y.o. female who presents to the clinic today for evaluation / follow up of left closed patellar fracture status post left total knee. Past History:    Current Outpatient Medications:     meloxicam (MOBIC) 15 MG tablet, Take 1 tablet by mouth daily, Disp: 30 tablet, Rfl: 3    SUMAtriptan (IMITREX) 50 MG tablet, Take at onset of headache; may repeat in 2 hours; no more than 2 in 24 hours. , Disp: 9 tablet, Rfl: 11    atorvastatin (LIPITOR) 20 MG tablet, Take 1 tablet by mouth daily, Disp: 90 tablet, Rfl: 3    pramipexole (MIRAPEX) 0.5 MG tablet, TAKE ONE TABLET BY MOUTH ONCE NIGHTLY, Disp: 90 tablet, Rfl: 3    citalopram (CELEXA) 20 MG tablet, TAKE ONE TABLET BY MOUTH ONCE DAILY, Disp: 90 tablet, Rfl: 3    ondansetron (ZOFRAN-ODT) 4 MG disintegrating tablet, Take 1 tablet by mouth every 8 hours as needed for Nausea or Vomiting, Disp: 30 tablet, Rfl: 0    Magnesium 500 MG CAPS, Take 1 capsule by mouth daily, Disp: , Rfl:     melatonin 5 MG TABS tablet, Take 10 mg by mouth nightly as needed, Disp: , Rfl:     CALCIUM CITRATE PO, Take 1,200 mg by mouth daily calciu, Disp: , Rfl:     Multiple Vitamins-Minerals (THERAPEUTIC MULTIVITAMIN-MINERALS) tablet, Take 1 tablet by mouth daily. , Disp: , Rfl:     omeprazole (PRILOSEC) 20 MG capsule, Take 20 mg by mouth daily. , Disp: , Rfl:   Allergies   Allergen Reactions    Darvon [Propoxyphene] Nausea And Vomiting and Other (See Comments)     Severe headaches    Morphine Nausea And Vomiting and Other (See Comments)     Severe headaches    Augmentin [Amoxicillin-Pot Clavulanate] Diarrhea and Nausea And Vomiting     Social History     Socioeconomic History    Marital status:      Spouse name: Not on file    Number of children: Not on file    Years of education: Not on file    Highest education level: Not on file   Occupational History    Not on file   Tobacco Use    Smoking status: Former Smoker     Packs/day: 0.50     Years: 3.00     Pack years: 1.50     Quit date:      Years since quittin.2    Smokeless tobacco: Never Used   Vaping Use    Vaping Use: Never used   Substance and Sexual Activity    Alcohol use: Yes     Comment: 1-2 per week    Drug use: No    Sexual activity: Not on file   Other Topics Concern    Not on file   Social History Narrative    Not on file     Social Determinants of Health     Financial Resource Strain:     Difficulty of Paying Living Expenses: Not on file   Food Insecurity:     Worried About Running Out of Food in the Last Year: Not on file    Roxanne of Food in the Last Year: Not on file   Transportation Needs:     Lack of Transportation (Medical): Not on file    Lack of Transportation (Non-Medical):  Not on file   Physical Activity:     Days of Exercise per Week: Not on file    Minutes of Exercise per Session: Not on file   Stress:     Feeling of Stress : Not on file   Social Connections:  Frequency of Communication with Friends and Family: Not on file    Frequency of Social Gatherings with Friends and Family: Not on file    Attends Congregational Services: Not on file    Active Member of Clubs or Organizations: Not on file    Attends Club or Organization Meetings: Not on file    Marital Status: Not on file   Intimate Partner Violence:     Fear of Current or Ex-Partner: Not on file    Emotionally Abused: Not on file    Physically Abused: Not on file    Sexually Abused: Not on file   Housing Stability:     Unable to Pay for Housing in the Last Year: Not on file    Number of [place] in the Last Year: Not on file    Unstable Housing in the Last Year: Not on file     Past Medical History:   Diagnosis Date    Arthritis     Bone spur of left foot     BRCA1 negative     Breast cancer ([facility] [address] 1998    left with reconstruction, no chemo or radiation    Colon polyp     Degenerative joint disease of right hip     Depression     Diverticulosis     Elevated liver enzymes     GERD (gastroesophageal reflux disease)     Hemorrhoids     Hyperlipidemia     Liver hemangioma     Migraine headache     Osteopenia     Overweight (BMI 25.0-29.9) 05/11/2015    Restless legs syndrome (RLS)     on Mirapex    Tubular adenoma      Past Surgical History:   Procedure Laterality Date    BREAST BIOPSY Left     cancer found    BREAST RECONSTRUCTION Left     using abdominal tissue    CATARACT REMOVAL WITH IMPLANT Bilateral     COLONOSCOPY      COLONOSCOPY  2 10 15    DIVERTICULOSIS AND HEMORRHOIDS , TUBULAR ADENOMA     COLONOSCOPY N/A 2/21/2018    COLONOSCOPY WITH BIOPSY performed by Yusef Valdivia MD at [address] [place] [address] N/A 7/29/2021    COLONOSCOPY DIAGNOSTIC performed by Yusef Valdivia MD at [address] [place] [address], [place], DIAGNOSTIC      EYE SURGERY      MASTECTOMY      MASTECTOMY Left     remainder of breast was removed because the margins were not clear on the partial mastectomy    MASTECTOMY, PARTIAL Left     for cancer    ND REMOVAL OF HEEL SPUR Left 11/5/2018    EXOSTECTOMY DORSAL FOOT performed by Johan Pham DPM at [address]      L3,4,5, fusion    TOTAL HIP ARTHROPLASTY Right 2/9/2021    HIP TOTAL ARTHROPLASTY ASI performed by John Florian MD at [address] [facility] Left 5/21/2019    KNEE TOTAL ARTHROPLASTY performed by John Florian MD at [address] [facility] ENDOSCOPY N/A 7/29/2021    EGD BIOPSY OF STOMACH AND ESOPHAGEAL ULCER performed by Liyah Goodwin MD at [facility] AND [facility]     Family History   Problem Relation Age of Onset    Ovarian Cancer Mother     Other Daughter         Wegener's disease    Heart Attack Father     Breast Cancer Maternal Grandmother    Plan  Patient may do her own exercises and she may discontinue the brace. I told her to try to avoid hyperflexion moments. We will see her back in a as needed basis otherwise annual follow-up      Provider Attestation:  Deanna Cueto, personally performed the services described in this documentation. All medical record entries made by the scribe were at my direction and in my presence. I have reviewed the chart and discharge instructions and agree that the records reflect my personal performance and is accurate and complete. John Florian MD. 04/11/22      Please note that this chart was generated using voice recognition Dragon dictation software. Although every effort was made to ensure the accuracy of this automated transcription, some errors in transcription may have occurred.

## 2022-04-18 NOTE — TELEPHONE ENCOUNTER
Patient called because she is having pain in her left foot. She said she was told to call next time she has a flare up and that you would send her in a steroid.

## 2022-04-19 RX ORDER — PREDNISONE 10 MG/1
1 TABLET ORAL DAILY
Qty: 21 EACH | Refills: 0 | Status: SHIPPED | OUTPATIENT
Start: 2022-04-19 | End: 2022-05-09

## 2022-05-09 ENCOUNTER — HOSPITAL ENCOUNTER (OUTPATIENT)
Dept: GENERAL RADIOLOGY | Age: 72
Discharge: HOME | End: 2022-05-11
Payer: MEDICARE

## 2022-05-09 ENCOUNTER — HOSPITAL ENCOUNTER (OUTPATIENT)
Age: 72
Discharge: HOME | End: 2022-05-11
Payer: MEDICARE

## 2022-05-09 DIAGNOSIS — R11.2 NON-INTRACTABLE VOMITING WITH NAUSEA, UNSPECIFIED VOMITING TYPE: ICD-10-CM

## 2022-05-09 PROCEDURE — 74018 RADEX ABDOMEN 1 VIEW: CPT

## 2022-06-13 ENCOUNTER — HOSPITAL ENCOUNTER (OUTPATIENT)
Dept: GENERAL RADIOLOGY | Facility: CLINIC | Age: 72
Discharge: HOME | End: 2022-06-15
Payer: MEDICARE

## 2022-06-13 ENCOUNTER — HOSPITAL ENCOUNTER (OUTPATIENT)
Facility: CLINIC | Age: 72
Discharge: HOME | End: 2022-06-15
Payer: MEDICARE

## 2022-06-13 DIAGNOSIS — M79.644 PAIN IN FINGER OF RIGHT HAND: ICD-10-CM

## 2022-06-13 PROCEDURE — 73130 X-RAY EXAM OF HAND: CPT

## 2022-06-14 RX ORDER — MELOXICAM 15 MG/1
15 TABLET ORAL DAILY
Qty: 30 TABLET | Refills: 3 | Status: SHIPPED | OUTPATIENT
Start: 2022-06-14 | End: 2022-07-11 | Stop reason: CLARIF

## 2022-06-22 ENCOUNTER — OFFICE VISIT (OUTPATIENT)
Dept: ORTHOPEDIC SURGERY | Age: 72
End: 2022-06-22
Payer: MEDICARE

## 2022-06-22 VITALS — HEIGHT: 63 IN | BODY MASS INDEX: 26.75 KG/M2 | WEIGHT: 151 LBS

## 2022-06-22 DIAGNOSIS — M20.011 MALLET FINGER OF RIGHT HAND: ICD-10-CM

## 2022-06-22 DIAGNOSIS — S62.660A CLOSED NONDISPLACED FRACTURE OF DISTAL PHALANX OF RIGHT INDEX FINGER, INITIAL ENCOUNTER: Primary | ICD-10-CM

## 2022-06-22 PROCEDURE — G8417 CALC BMI ABV UP PARAM F/U: HCPCS | Performed by: PHYSICIAN ASSISTANT

## 2022-06-22 PROCEDURE — 1036F TOBACCO NON-USER: CPT | Performed by: PHYSICIAN ASSISTANT

## 2022-06-22 PROCEDURE — G8427 DOCREV CUR MEDS BY ELIG CLIN: HCPCS | Performed by: PHYSICIAN ASSISTANT

## 2022-06-22 PROCEDURE — 99214 OFFICE O/P EST MOD 30 MIN: CPT | Performed by: PHYSICIAN ASSISTANT

## 2022-06-22 PROCEDURE — 1090F PRES/ABSN URINE INCON ASSESS: CPT | Performed by: PHYSICIAN ASSISTANT

## 2022-06-22 PROCEDURE — 1123F ACP DISCUSS/DSCN MKR DOCD: CPT | Performed by: PHYSICIAN ASSISTANT

## 2022-06-22 PROCEDURE — 3017F COLORECTAL CA SCREEN DOC REV: CPT | Performed by: PHYSICIAN ASSISTANT

## 2022-06-22 PROCEDURE — G8400 PT W/DXA NO RESULTS DOC: HCPCS | Performed by: PHYSICIAN ASSISTANT

## 2022-06-22 NOTE — PROGRESS NOTES
[address] [facility], [address] [place], [address] [place] [address], [address] [facility]           Dept Phone: [phone]           Dept Fax:  [phone] [address] [facility]           [place]          Dept Phone: [phone]           Dept Fax:  [phone]      Chief Compliant:  Chief Complaint   Patient presents with    Hand Pain     Right Index finger        History of Present Illness: This is a 74 y.o. female who presents to the clinic today for evaluation of right index finger injury. Patient reports she had a fall while at [employer] approximately 1 month ago in which she fell onto this right hand. She did note immediate pain to the right index finger but did not think much of it she did not notice any obvious deformity at that time. Patient reports over the following weeks she continued to have pain until she was evaluated by her primary care provider on 6/13/2022 where x-rays were done at that time demonstrating a fracture what appeared to be an osteophytic spur at the second DIP joint. Patient was referred to orthopedics for further evaluation which brings her in today. Patient reports that her pain has improved mildly but she continues to have quite a bit of swelling at the second DIP joint. She is also with great difficulty fully extending the finger. She does note arthritic deformities of multiple fingers in the hand especially the second and third on the left hand and the third digit on the right hand but notes significant swelling of the second digit that continues. Past History:    Current Outpatient Medications:     meloxicam (MOBIC) 15 MG tablet, Take 1 tablet by mouth daily, Disp: 30 tablet, Rfl: 3    SUMAtriptan (IMITREX) 50 MG tablet, Take at onset of headache; may repeat in 2 hours; no more than 2 in 24 hours. , Disp: 9 tablet, Rfl: 11   Year: Not on file   Transportation Needs:     Lack of Transportation (Medical): Not on file    Lack of Transportation (Non-Medical):  Not on file   Physical Activity:     Days of Exercise per Week: Not on file    Minutes of Exercise per Session: Not on file   Stress:     Feeling of Stress : Not on file   Social Connections:     Frequency of Communication with Friends and Family: Not on file    Frequency of Social Gatherings with Friends and Family: Not on file    Attends Rastafari Services: Not on file    Active Member of [address] [place] [address] [employer] or Organizations: Not on file    Attends Club or Organization Meetings: Not on file    Marital Status: Not on file   Intimate Partner Violence:     Fear of Current or Ex-Partner: Not on file    Emotionally Abused: Not on file    Physically Abused: Not on file    Sexually Abused: Not on file   Housing Stability:     Unable to Pay for Housing in the Last Year: Not on file    Number of [place] in the Last Year: Not on file    Unstable Housing in the Last Year: Not on file     Past Medical History:   Diagnosis Date    Arthritis     Bone spur of left foot     BRCA1 negative     Breast cancer ([facility] [address] 1998    left with reconstruction, no chemo or radiation    Colon polyp     Degenerative joint disease of right hip     Depression     Diverticulosis     Elevated liver enzymes     GERD (gastroesophageal reflux disease)     Hemorrhoids     Hyperlipidemia     Liver hemangioma     Migraine headache     Osteopenia     Overweight (BMI 25.0-29.9) 05/11/2015    Restless legs syndrome (RLS)     on Mirapex    Tubular adenoma      Past Surgical History:   Procedure Laterality Date    BREAST BIOPSY Left     cancer found    BREAST RECONSTRUCTION Left     using abdominal tissue    CATARACT REMOVAL WITH IMPLANT Bilateral     COLONOSCOPY      COLONOSCOPY  2 10 15    DIVERTICULOSIS AND HEMORRHOIDS , TUBULAR ADENOMA     COLONOSCOPY N/A 2/21/2018    COLONOSCOPY WITH BIOPSY performed by Amador Egan MD at [address] COLONOSCOPY N/A 7/29/2021    COLONOSCOPY DIAGNOSTIC performed by Amador Egan MD at [address], COLON, DIAGNOSTIC      EYE SURGERY      MASTECTOMY      MASTECTOMY Left     remainder of breast was removed because the margins were not clear on the partial mastectomy    MASTECTOMY, PARTIAL Left     for cancer    DE REMOVAL OF HEEL SPUR Left 11/5/2018    EXOSTECTOMY DORSAL FOOT performed by Riya Pineda DPM at [address]      L3,4,5, fusion   [address] Right 2/9/2021    HIP TOTAL ARTHROPLASTY ASI performed by Rosa Isela Dee MD at [address] Left 5/21/2019    KNEE TOTAL ARTHROPLASTY performed by Rosa Isela Dee MD at [address] [facility] ENDOSCOPY N/A 7/29/2021    EGD BIOPSY OF STOMACH AND ESOPHAGEAL ULCER performed by Amador Egan MD at [facility] AND [facility]     Family History   Problem Relation Age of Onset    Ovarian Cancer Mother     Other Daughter         Wegener's disease    Heart Attack Father     Breast Cancer Maternal Grandmother         Review of Systems   Constitutional: Negative for fever, chills, sweats. Eyes: Negative for changes in vision, or pain. HENT: Negative for ear ache, epistaxis, or sore throat. Respiratory/Cardio: Negative for Chest pain, palpitations, SOB, or cough. Gastrointestinal: Negative for abdominal pain, N/V/D. Genitourinary: Negative for dysuria, frequency, urgency, or hematuria. Neurological: Negative for headache, numbness, or weakness. Integumentary: Negative for rash, itching, laceration, or abrasion. Musculoskeletal: Positive for Hand Pain (Right Index finger)       Physical Exam:  Constitutional: Patient is oriented to person, place, and time. Patient appears well-developed and well nourished.    HENT: Negative otherwise noted  Head: Normocephalic and Atraumatic  Nose: Normal  Eyes: Conjunctivae and EOM are normal  Neck: Normal range of motion Neck supple. Respiratory/Cardio: Effort normal. No respiratory distress. Musculoskeletal:    right Hand/Wrist    Tenderness: Moderate tenderness to the volar wrist.  No tenderness of the first dorsal compartment or first CMC joint. No tenderness to the radial styloid, DRUJ or ulnar styloid. No tenderness to the TFCC, anatomic snuffbox or scaphoid tubercle. Patient with swan-neck deformity of second third and fourth digits on both hands   Range of Motion:      Pronation: 90     Supination: 90     Flexion: 90     Extension: 50     Hand Joints: Hand ROM    AROM PROM     Left Right Left  Right     INDEX MCP Flexion/Extension 90/20 90/20 90/20 90/20    PIP Flexion/Extension 100/0 100/0 100/0 100/0    DIP Flexion/Extension 90/0 90/-30 90/0 90/0         Patient with mallet deformity at approximately 30 degrees extensor lag in resting position of the second DIP joint of the right hand. Passively can get all the way to 0 degrees without any significant pain but unable to hold this position. Normal flexion of all joints in the right second digit. Muscle Strength      : 5/5     Wrist Extension: 5/5     Wrist Flexion: 5/5       Sensation: normal   Phalen's Sign: Negative   Tinel's Sign (Medial Nerve): Negative   Finkelstein's Test: Negative       Neurological: Patient is alert and oriented to person, place, and time. Normal strenght. No sensory deficit. Skin: Skin is warm and dry  Psychiatric: Behavior is normal. Thought content normal.  Nursing note and vitals reviewed. Labs and Imaging:     XR taken today:  No results found. No new x-rays taken today however 3 views of the right hand on 6/13/2022  4 views of the right hand on 6/13/2022 demonstrate a lucency through a osteophytic spur on the dorsal aspect of the second distal phalanx base consistent with a nondisplaced fracture. Finger resting in a position of slight flexion.   Patient with significant arthritic changes of the DIP joint of second through fourth digits no other acute fracture noted. No orders of the defined types were placed in this encounter. Assessment and Plan:  1. Closed nondisplaced fracture of distal phalanx of right index finger, initial encounter          PLAN:  This is a 74 y.o. female who presents to the clinic today for evaluation of right index finger injury which occurred 1 month ago consistent with a subacute fracture of the second proximal distal phalanx. Radiographs are consistent with timing of injury. On examination today there is concern not only for fracture but also for possible extensor tendon injury/mallet deformity. 1.  Had lengthy discussion with patient and  on the anatomy of the finger as well as nature and extent of mallet deformity and fracture. 2.  They are educated given that this is a subacute injury that patient may have long-term deformity and slight flexion at the second DIP joint but would recommend They are educated about surgical options but recommend proceeding conservatively prior to proceeding with surgical discussions which they were agreeable with. 3.  Patient fitted for a stack splint today also provided with a smaller splint should her swelling improve recommend wearing this 24/7 until reevaluated. 4.  Follow-up in 2 weeks for reevaluation with repeat x-rays of the right index finger we will likely keep the patient in splint for 4 more weeks with x-rays every 2 she is agreeable plan at this time she may call or return sooner for any questions or concerns          Please note that this chart was generated using voice recognition Dragon dictation software. Although every effort was made to ensure the accuracy of this automated transcription, some errors in transcription may have occurred.

## 2022-06-27 NOTE — ANESTHESIA POSTPROCEDURE EVALUATION
POST- ANESTHESIA EVALUATION       Pt Name: Kedric Nageotte  MRN: [id number]  [place]: 1950  Date of evaluation: 7/29/2021  Time:  11:11 AM      /68   Pulse 80   Temp 97.8 °F (36.6 °C)   Resp 19   Ht 5' 3\" (1.6 m)   Wt 150 lb (68 kg)   SpO2 96%   BMI 26.57 kg/m²      Consciousness Level  Awake  Cardiopulmonary Status  Stable  Pain Adequately Treated YES  Nausea / Vomiting  NO  Adequate Hydration  YES  Anesthesia Related Complications NONE      Electronically signed by Nando Bell MD on 7/29/2021 at 11:11 AM       Department of Anesthesiology  Postprocedure Note    Patient: Kedric Nageotte  MRN: [id number]  [place]: 1950  Date of evaluation: 7/29/2021  Time:  11:10 AM     Procedure Summary     Date: 07/29/21 Room / Location: [address] [place] [address] 04 / [address] [place] [address] ENDO    Anesthesia Start: 0901 Anesthesia Stop: 1710    Procedures:       EGD BIOPSY OF STOMACH AND ESOPHAGEAL ULCER (N/A Esophagus)      COLONOSCOPY DIAGNOSTIC (N/A ) Diagnosis:       (GERD)      ([facility])      (PT VACCINATED)    Surgeons: Saravanan Skelton MD Responsible Provider: Nando Bell MD    Anesthesia Type: general ASA Status: 2          Anesthesia Type: general    Juan David Phase I: Juan David Score: 10    Juan David Phase II:      Last vitals: Reviewed and per EMR flowsheets.        Anesthesia Post Evaluation negative

## 2022-07-11 RX ORDER — MELOXICAM 15 MG/1
15 TABLET ORAL DAILY
Qty: 30 TABLET | Refills: 3 | Status: CANCELLED | OUTPATIENT
Start: 2022-07-11

## 2022-07-11 RX ORDER — MELOXICAM 15 MG/1
15 TABLET ORAL DAILY
Qty: 30 TABLET | Refills: 3 | Status: SHIPPED | OUTPATIENT
Start: 2022-07-11 | End: 2022-09-22 | Stop reason: SDUPTHER

## 2022-07-20 ENCOUNTER — OFFICE VISIT (OUTPATIENT)
Dept: ORTHOPEDIC SURGERY | Age: 72
End: 2022-07-20
Payer: MEDICARE

## 2022-07-20 VITALS — WEIGHT: 151 LBS | BODY MASS INDEX: 26.75 KG/M2 | HEIGHT: 63 IN

## 2022-07-20 DIAGNOSIS — M20.011 MALLET FINGER OF RIGHT HAND: ICD-10-CM

## 2022-07-20 DIAGNOSIS — S62.660A CLOSED NONDISPLACED FRACTURE OF DISTAL PHALANX OF RIGHT INDEX FINGER, INITIAL ENCOUNTER: Primary | ICD-10-CM

## 2022-07-20 PROCEDURE — 1123F ACP DISCUSS/DSCN MKR DOCD: CPT | Performed by: PHYSICIAN ASSISTANT

## 2022-07-20 PROCEDURE — 1036F TOBACCO NON-USER: CPT | Performed by: PHYSICIAN ASSISTANT

## 2022-07-20 PROCEDURE — 3017F COLORECTAL CA SCREEN DOC REV: CPT | Performed by: PHYSICIAN ASSISTANT

## 2022-07-20 PROCEDURE — 99213 OFFICE O/P EST LOW 20 MIN: CPT | Performed by: PHYSICIAN ASSISTANT

## 2022-07-20 PROCEDURE — 1090F PRES/ABSN URINE INCON ASSESS: CPT | Performed by: PHYSICIAN ASSISTANT

## 2022-07-20 PROCEDURE — G8400 PT W/DXA NO RESULTS DOC: HCPCS | Performed by: PHYSICIAN ASSISTANT

## 2022-07-20 PROCEDURE — G8417 CALC BMI ABV UP PARAM F/U: HCPCS | Performed by: PHYSICIAN ASSISTANT

## 2022-07-20 PROCEDURE — G8427 DOCREV CUR MEDS BY ELIG CLIN: HCPCS | Performed by: PHYSICIAN ASSISTANT

## 2022-07-20 NOTE — PROGRESS NOTES
[id number] [facility], [address] [place], [address] [place] [address], [address] [facility]           Dept Phone: [phone]           Dept Fax:  [phone] [address] [facility]           [place]          Dept Phone: [phone]           Dept Fax:  [phone]      Chief Compliant:  Chief Complaint   Patient presents with    Hand Injury     Right, Index finger        History of Present Illness:  Angela Later returns today. This is a 74 y.o. female who presents to the clinic today for follow up of Right index finger injury. Patient initially seen for this issue on 6/22/2022 after a fall approximately 1 month prior. Initial x-rays on 6/13/2022 demonstrated a fracture do not see if it is prior to the second DIP joint on evaluation by me on 6/22/2022 there was concern for possible extensor tendon injury as well however patient has significant rheumatic degenerative changes of all her digits which could contribute to a slight flexion deformity of the second digit. Patient was fitted for a stack splint and was instructed to wear this 24/7 until reevaluated. She does admit she will not wear the splint for 1 week and over the last week he has been out of the splint. Patient reports she does continue swelling of the second DIP joint but has very minimal pain she has regained near full range of motion but continues to rest in a slightly flexed position at the second DIP joint. Patient denies any joint warmth or redness no other complaints at this time. Review of Systems   Constitutional: Negative for fever, chills, sweats, recent illness, or recent injury. Neurological: Negative for headaches, numbness, or weakness. Integumentary: Negative for rash, itching, ecchymosis, abrasions, or laceration.    Musculoskeletal: Positive for Hand Injury (Right, Index finger)       Physical Exam:  Constitutional: Patient is oriented to person, place, and time. Patient appears well-developed and well nourished. Musculoskeletal:    right Hand/Wrist     Tenderness: Tenderness to the second DIP joint dorsally or volarly. No  tenderness to the volar wrist.  No tenderness of the first dorsal compartment or first Aia 16 joint. No tenderness to the radial styloid, DRUJ or ulnar styloid. No tenderness to the TFCC, anatomic snuffbox or scaphoid tubercle. Patient with swan-neck deformity of second third and fourth digits on both hands   Range of Motion:       Pronation: 90     Supination: 90     Flexion: 90     Extension: 80     Hand Joints: Hand ROM              AROM PROM       Left Right Left Right      INDEX MCP Flexion/Extension 90/20 90/20 90/20 90/20     PIP Flexion/Extension 100/0 100/0 100/0 100/0     DIP Flexion/Extension 90/0 90/-20 90/0 90/0      Patient continues have a slight flexion contracture at the second DIP joint albeit slightly improved from last evaluation. Patient with mallet deformity at approximately 20 degrees extensor lag in resting position of the second DIP joint of the right hand. Passively can get all the way to 0 degrees without any significant pain but unable to hold this position. Normal flexion of all joints in the right second digit. Muscle Strength       : 5/5     Wrist Extension: 5/5     Wrist Flexion: 5/5         Sensation: normal   Phalen's Sign: Negative   Tinel's Sign (Medial Nerve): Negative   Finkelstein's Test: Negative     Neurological: Patient is alert and oriented to person, place, and time. Normal strenght. No sensory deficit. Skin: Skin is warm and dry  Psychiatric: Behavior is normal. Thought content normal.  Nursing note and vitals reviewed. Labs and Imaging:     XR taken today:  No results found.      X-rays taken in clinic and preliminarily reviewed by me 7/20/22:   3 views of the left hand again demonstrate likely fracture through an software. Although every effort was made to ensure the accuracy of this automated transcription, some errors in transcription may have occurred.

## 2022-08-24 ENCOUNTER — OFFICE VISIT (OUTPATIENT)
Dept: ORTHOPEDIC SURGERY | Age: 72
End: 2022-08-24
Payer: MEDICARE

## 2022-08-24 VITALS — BODY MASS INDEX: 26.75 KG/M2 | HEIGHT: 63 IN | RESPIRATION RATE: 14 BRPM | WEIGHT: 151 LBS

## 2022-08-24 DIAGNOSIS — S62.660D CLOSED NONDISPLACED FRACTURE OF DISTAL PHALANX OF RIGHT INDEX FINGER WITH ROUTINE HEALING, SUBSEQUENT ENCOUNTER: Primary | ICD-10-CM

## 2022-08-24 DIAGNOSIS — M20.011 MALLET FINGER OF RIGHT HAND: ICD-10-CM

## 2022-08-24 PROCEDURE — 1123F ACP DISCUSS/DSCN MKR DOCD: CPT | Performed by: PHYSICIAN ASSISTANT

## 2022-08-24 PROCEDURE — 99214 OFFICE O/P EST MOD 30 MIN: CPT | Performed by: PHYSICIAN ASSISTANT

## 2022-08-24 PROCEDURE — G8427 DOCREV CUR MEDS BY ELIG CLIN: HCPCS | Performed by: PHYSICIAN ASSISTANT

## 2022-08-24 PROCEDURE — G8400 PT W/DXA NO RESULTS DOC: HCPCS | Performed by: PHYSICIAN ASSISTANT

## 2022-08-24 PROCEDURE — G8417 CALC BMI ABV UP PARAM F/U: HCPCS | Performed by: PHYSICIAN ASSISTANT

## 2022-08-24 PROCEDURE — 3017F COLORECTAL CA SCREEN DOC REV: CPT | Performed by: PHYSICIAN ASSISTANT

## 2022-08-24 PROCEDURE — 1036F TOBACCO NON-USER: CPT | Performed by: PHYSICIAN ASSISTANT

## 2022-08-24 PROCEDURE — 1090F PRES/ABSN URINE INCON ASSESS: CPT | Performed by: PHYSICIAN ASSISTANT

## 2022-10-04 ENCOUNTER — OFFICE VISIT (OUTPATIENT)
Dept: PODIATRY | Age: 72
End: 2022-10-04
Payer: MEDICARE

## 2022-10-04 VITALS — WEIGHT: 157 LBS | BODY MASS INDEX: 27.82 KG/M2 | HEIGHT: 63 IN

## 2022-10-04 DIAGNOSIS — R60.0 EDEMA OF LEFT FOOT: ICD-10-CM

## 2022-10-04 DIAGNOSIS — M79.672 FOOT PAIN, LEFT: ICD-10-CM

## 2022-10-04 DIAGNOSIS — M19.072 PRIMARY OSTEOARTHRITIS OF LEFT FOOT: Primary | ICD-10-CM

## 2022-10-04 PROCEDURE — G8400 PT W/DXA NO RESULTS DOC: HCPCS | Performed by: PODIATRIST

## 2022-10-04 PROCEDURE — 1090F PRES/ABSN URINE INCON ASSESS: CPT | Performed by: PODIATRIST

## 2022-10-04 PROCEDURE — 1036F TOBACCO NON-USER: CPT | Performed by: PODIATRIST

## 2022-10-04 PROCEDURE — G8417 CALC BMI ABV UP PARAM F/U: HCPCS | Performed by: PODIATRIST

## 2022-10-04 PROCEDURE — 1123F ACP DISCUSS/DSCN MKR DOCD: CPT | Performed by: PODIATRIST

## 2022-10-04 PROCEDURE — 3017F COLORECTAL CA SCREEN DOC REV: CPT | Performed by: PODIATRIST

## 2022-10-04 PROCEDURE — G8427 DOCREV CUR MEDS BY ELIG CLIN: HCPCS | Performed by: PODIATRIST

## 2022-10-04 PROCEDURE — 99213 OFFICE O/P EST LOW 20 MIN: CPT | Performed by: PODIATRIST

## 2022-10-04 PROCEDURE — G8484 FLU IMMUNIZE NO ADMIN: HCPCS | Performed by: PODIATRIST

## 2022-10-04 RX ORDER — PREDNISONE 10 MG/1
1 TABLET ORAL DAILY
Qty: 21 EACH | Refills: 2 | Status: SHIPPED | OUTPATIENT
Start: 2022-10-04

## 2022-10-04 ASSESSMENT — ENCOUNTER SYMPTOMS
NAUSEA: 0
COLOR CHANGE: 0
DIARRHEA: 0
VOMITING: 0
CONSTIPATION: 0

## 2022-10-04 NOTE — PROGRESS NOTES
[facility]  Return Patient     Zaina Kaplan 74 y.o. female that presents for follow-up of   Chief Complaint   Patient presents with    Foot Pain     Left foot pain and swelling, started 3 weeks ago     She is is having pain left foot, was walking and riding a stationary bike. Getting better. But has been taking it easy. Last xray was a year ago. Pain and swelling for 3 weeks. History: Patient called last week to get in emergently. She has been having increased pain for about 4 weeks, no injury, better with shoes. Has tried topicals, Tylenol. Not much help. .     I did a dorsal exostectomy in 2018. Pain left foot came on about a weeks ago. She has been doing well, no pain since her surgery. Woke up with pain, no increase in activity. No injury, no change in shoes, no treatment. Feels good in sturdy shoe, and if she only puts pressure on her heel  Currently denies F/C/N/V. Allergies   Allergen Reactions    Darvon [Propoxyphene] Nausea And Vomiting and Other (See Comments)     Severe headaches    Morphine Nausea And Vomiting and Other (See Comments)     Severe headaches    Augmentin [Amoxicillin-Pot Clavulanate] Diarrhea and Nausea And Vomiting       Past Medical History:   Diagnosis Date    Arthritis     Bone spur of left foot     BRCA1 negative     Breast cancer ([facility] [address] 1998    left with reconstruction, no chemo or radiation    Colon polyp     Degenerative joint disease of right hip     Depression     Diverticulosis     Elevated liver enzymes     GERD (gastroesophageal reflux disease)     Hemorrhoids     Hyperlipidemia     Liver hemangioma     Migraine headache     Osteopenia     Overweight (BMI 25.0-29.9) 05/11/2015    Restless legs syndrome (RLS)     on Mirapex    Tubular adenoma        Prior to Admission medications    Medication Sig Start Date End Date Taking?  Authorizing Provider   predniSONE 10 MG (21) TBPK Take 1 Dose by mouth daily Take 60mg po on day 1, 50mg po on day 2, 40mg po on day 3, 30mg po on day 4, 20 mg po on day 5, 10mg po on day 6 10/4/22  Yes Robb Roberts DPM   meloxicam (MOBIC) 15 MG tablet Take 1 tablet by mouth daily 9/22/22  Yes Pina Hazel MD   SUMAtriptan (IMITREX) 50 MG tablet Take at onset of headache; may repeat in 2 hours; no more than 2 in 24 hours. 2/17/22  Yes ANNIE Painter NP   atorvastatin (LIPITOR) 20 MG tablet Take 1 tablet by mouth daily 1/28/22  Yes Pina Hazel MD   pramipexole (MIRAPEX) 0.5 MG tablet TAKE ONE TABLET BY MOUTH ONCE NIGHTLY 1/28/22  Yes Pina Hazel MD   citalopram (CELEXA) 20 MG tablet TAKE ONE TABLET BY MOUTH ONCE DAILY 1/28/22  Yes Pina Hazel MD   Magnesium 500 MG CAPS Take 1 capsule by mouth daily   Yes Historical Provider, MD   melatonin 5 MG TABS tablet Take 10 mg by mouth nightly as needed   Yes Historical Provider, MD   CALCIUM CITRATE PO Take 1,200 mg by mouth daily calciu   Yes Historical Provider, MD   Multiple Vitamins-Minerals (THERAPEUTIC MULTIVITAMIN-MINERALS) tablet Take 1 tablet by mouth daily. Yes Historical Provider, MD   omeprazole (PRILOSEC) 20 MG capsule Take 20 mg by mouth daily. Yes Historical Provider, MD   benzonatate (TESSALON) 100 MG capsule take 1 capsule by mouth every 6 hours if needed for cough 9/6/22   Historical Provider, MD       Review of Systems   Constitutional:  Negative for chills, diaphoresis, fatigue, fever and unexpected weight change. Cardiovascular:  Negative for leg swelling. Gastrointestinal:  Negative for constipation, diarrhea, nausea and vomiting. Musculoskeletal:  Positive for arthralgias, gait problem and joint swelling. Skin:  Negative for color change, pallor, rash and wound. Neurological:  Negative for weakness and numbness. Lower Extremity Physical Examination:     Vitals: There were no vitals filed for this visit. General: AAO x 3 in NAD.      Integument:   Warm, dry, supple, Bilateral.  Open lesion absent, Bilateral. Vascular: DP and PT pulses palpable 2/4, Bilateral.  CFT <3 seconds, Bilateral.  Hair growth absent to the level of the digits, Bilateral.  Edema present, Left. Varicosities absent, Bilateral. Erythema present, Left, increased temperature left midfoot. Neurological:  Sensation present to light touch to level of digits, Bilateral.    Musculoskeletal: Muscle strength 5/5, Left. Minimal Pain present upon palpation of lateral tarsal metatarsal joint, no pain over 1,2 met cun joint, pain 3 met cun joint, and 4-5 met cuboid joint, Left. normal medial longitudinal arch, Bilateral.  Ankle ROM normal,Bilateral.      Radiographs: 3 views left Foot:  Severe degenerative changes at the tarsal metatarsal joint. No acute pathology. Asessment: Patient is a 74 y.o. female with:   1. Primary osteoarthritis of left foot    2. Edema of left foot    3. Foot pain, left        Plan: Patient examined and evaluated. Current condition and treatment options discussed in detail. Verbal and written instructions given to patient. Contact office with any questions/problems/concerns. Prednisone taper-refills give for in the future if needed  Offered a Darco shoe or short CAM walker-may need  Rest, ice  Sturdy shoe  Powerstep  Topicals, Voltaren gel  Discussed injection.     Orders Placed This Encounter   Procedures    XR FOOT LEFT (MIN 3 VIEWS)       Orders Placed This Encounter   Medications    predniSONE 10 MG (21) TBPK     Sig: Take 1 Dose by mouth daily Take 60mg po on day 1, 50mg po on day 2, 40mg po on day 3, 30mg po on day 4, 20 mg po on day 5, 10mg po on day 6     Dispense:  21 each     Refill:  2          RTC in as needed  10/4/2022

## 2022-11-17 ENCOUNTER — HOSPITAL ENCOUNTER (OUTPATIENT)
Dept: WOMENS IMAGING | Age: 72
Discharge: HOME | End: 2022-11-19
Payer: MEDICARE

## 2022-11-17 DIAGNOSIS — Z12.31 BREAST CANCER SCREENING BY MAMMOGRAM: ICD-10-CM

## 2022-11-17 PROCEDURE — 77063 BREAST TOMOSYNTHESIS BI: CPT

## 2023-03-01 ENCOUNTER — OFFICE VISIT (OUTPATIENT)
Dept: PODIATRY | Age: 73
End: 2023-03-01
Payer: MEDICARE

## 2023-03-01 VITALS — BODY MASS INDEX: 27.82 KG/M2 | WEIGHT: 157 LBS | HEIGHT: 63 IN

## 2023-03-01 DIAGNOSIS — M79.672 FOOT PAIN, LEFT: ICD-10-CM

## 2023-03-01 DIAGNOSIS — R60.0 EDEMA OF LEFT FOOT: ICD-10-CM

## 2023-03-01 DIAGNOSIS — M25.375 INSTABILITY OF FOOT JOINT, LEFT: ICD-10-CM

## 2023-03-01 DIAGNOSIS — M19.072 PRIMARY OSTEOARTHRITIS OF LEFT FOOT: Primary | ICD-10-CM

## 2023-03-01 PROCEDURE — 1123F ACP DISCUSS/DSCN MKR DOCD: CPT | Performed by: PODIATRIST

## 2023-03-01 PROCEDURE — G8484 FLU IMMUNIZE NO ADMIN: HCPCS | Performed by: PODIATRIST

## 2023-03-01 PROCEDURE — 1090F PRES/ABSN URINE INCON ASSESS: CPT | Performed by: PODIATRIST

## 2023-03-01 PROCEDURE — G8417 CALC BMI ABV UP PARAM F/U: HCPCS | Performed by: PODIATRIST

## 2023-03-01 PROCEDURE — 99214 OFFICE O/P EST MOD 30 MIN: CPT | Performed by: PODIATRIST

## 2023-03-01 PROCEDURE — G8400 PT W/DXA NO RESULTS DOC: HCPCS | Performed by: PODIATRIST

## 2023-03-01 PROCEDURE — 3017F COLORECTAL CA SCREEN DOC REV: CPT | Performed by: PODIATRIST

## 2023-03-01 PROCEDURE — 1036F TOBACCO NON-USER: CPT | Performed by: PODIATRIST

## 2023-03-01 PROCEDURE — G8427 DOCREV CUR MEDS BY ELIG CLIN: HCPCS | Performed by: PODIATRIST

## 2023-03-01 ASSESSMENT — ENCOUNTER SYMPTOMS
DIARRHEA: 0
CONSTIPATION: 0
COLOR CHANGE: 0
VOMITING: 0
NAUSEA: 0

## 2023-03-01 NOTE — PROGRESS NOTES
[facility]  Return Patient     Zaina Roberts 74 y.o. female that presents for follow-up of   Chief Complaint   Patient presents with    Foot Pain     Top of left foot painful      She is is having pain left foot, was on vacation, on a cruise, took prednisone prior, helped a little, but foot getting worse. She is having pain all across her left foot. Pain and swelling getting worse, rubs on shoes, wearing a lift on the left, using topicals. Would like to talk about surgery. I did a dorsal exostectomy in 2018. Currently denies F/C/N/V. Allergies   Allergen Reactions    Darvon [Propoxyphene] Nausea And Vomiting and Other (See Comments)     Severe headaches    Morphine Nausea And Vomiting and Other (See Comments)     Severe headaches    Augmentin [Amoxicillin-Pot Clavulanate] Diarrhea and Nausea And Vomiting       Past Medical History:   Diagnosis Date    Arthritis     Bone spur of left foot     BRCA1 negative     Breast cancer ([facility] [address] 1998    left with reconstruction, no chemo or radiation    Colon polyp     Degenerative joint disease of right hip     Depression     Diverticulosis     Elevated liver enzymes     GERD (gastroesophageal reflux disease)     Hemorrhoids     Hyperlipidemia     Liver hemangioma     Migraine headache     Osteopenia     Overweight (BMI 25.0-29.9) 05/11/2015    Restless legs syndrome (RLS)     on Mirapex    Tubular adenoma        Prior to Admission medications    Medication Sig Start Date End Date Taking?  Authorizing Provider   citalopram (CELEXA) 20 MG tablet TAKE ONE TABLET BY MOUTH ONCE DAILY 10/28/22  Yes Concha Cruz MD   atorvastatin (LIPITOR) 20 MG tablet Take 1 tablet by mouth daily 10/28/22  Yes Concha Cruz MD   pramipexole (MIRAPEX) 0.5 MG tablet TAKE ONE TABLET BY MOUTH ONCE NIGHTLY 10/28/22  Yes Concha Cruz MD   predniSONE 10 MG (21) TBPK Take 1 Dose by mouth daily Take 60mg po on day 1, 50mg po on day 2, 40mg po on day 3, 30mg po on day 4, 20 mg po on day 5, 10mg po on day 6 10/4/22  Yes Renee Whittaker DPM   benzonatate (TESSALON) 100 MG capsule take 1 capsule by mouth every 6 hours if needed for cough 9/6/22  Yes Historical Provider, MD   meloxicam (MOBIC) 15 MG tablet Take 1 tablet by mouth daily 9/22/22  Yes Lacey Rodriguez MD   SUMAtriptan (IMITREX) 50 MG tablet Take at onset of headache; may repeat in 2 hours; no more than 2 in 24 hours. 2/17/22  Yes ANNIE Bailey NP   Magnesium 500 MG CAPS Take 1 capsule by mouth daily   Yes Historical Provider, MD   melatonin 5 MG TABS tablet Take 10 mg by mouth nightly as needed   Yes Historical Provider, MD   CALCIUM CITRATE PO Take 1,200 mg by mouth daily calciu   Yes Historical Provider, MD   Multiple Vitamins-Minerals (THERAPEUTIC MULTIVITAMIN-MINERALS) tablet Take 1 tablet by mouth daily. Yes Historical Provider, MD   omeprazole (PRILOSEC) 20 MG capsule Take 20 mg by mouth daily. Yes Historical Provider, MD       Review of Systems   Constitutional:  Negative for chills, diaphoresis, fatigue, fever and unexpected weight change. Cardiovascular:  Negative for leg swelling. Gastrointestinal:  Negative for constipation, diarrhea, nausea and vomiting. Musculoskeletal:  Positive for arthralgias, gait problem and joint swelling. Skin:  Negative for color change, pallor, rash and wound. Neurological:  Negative for weakness and numbness. Lower Extremity Physical Examination:     Vitals: There were no vitals filed for this visit. General: AAO x 3 in NAD. Integument:   Warm, dry, supple, Bilateral.  Open lesion absent, Bilateral.        Vascular: DP and PT pulses palpable 2/4, Bilateral.  CFT <3 seconds, Bilateral.  Hair growth absent to the level of the digits, Bilateral.  Edema present, Left. Varicosities absent, Bilateral. Erythema present, Left, increased temperature left midfoot.      Neurological:  Sensation present to light touch to level of digits, Bilateral.    Musculoskeletal: Muscle strength 5/5, Left. Pain present upon palpation of lateral tarsal metatarsal joint,  pain over 1,2 met cun joint, pain 3 met cun joint, and lesser pain over 4-5 met cuboid joint, Left. Pain most severe over the first and 3rd metatarsal cuneiform joint. normal medial longitudinal arch, Bilateral.  Ankle ROM normal,Bilateral.      Radiographs: 3 views left Foot:  Severe degenerative changes at the tarsal metatarsal joint. No acute pathology. Asessment: Patient is a 74 y.o. female with:   1. Primary osteoarthritis of left foot    2. Edema of left foot    3. Foot pain, left    4. Instability of foot joint, left        Plan: Pt was evaluated and examined. Patient was given personalized discharge instructions. Pt will follow up in  for surgery or sooner if any problems arise. Diagnosis was discussed with the pt and all of their questions were answered in detail. Proper foot hygiene and care was discussed with the pt. Patient to check feet daily and contact the office with any questions/problems/concerns. Other comorbidity noted and will be managed by PCP. All labs were reviewed and all imagining including the above findings were reviewed PRIOR to and during the patients arrival and with the patient today. Previous patient encounter was reviewed. Encounters from the patients other medical providers were reviewed and noted. Time was spent educating the patient and their families/caregivers on proper care of the feet and ankles. All the above diagnosis were addressed at todays visit and all questions were answered. A total of 40 minutes was spent with this patients encounter which included charting after the patients visit        Discussed conservative and surgical options today. Prednisone taper  Offered a Darco shoe or short CAM walker-may need  Rest, ice  Sturdy shoe  Powerstep  Topicals, Voltaren gel  Discussed injection.     I discussed in detail arthrodesis multiple midfoot joints left, harvest of bone marrow and reimplantation. Will need a CT scan and VIt D levels. Discussed post op course in detail. Also. Discussed with her  as well. Milo Cast He/She was in full agreement and understood risks. The reason for surgery is due to unsuccessful non-operative treatment and/or conservative treatment is not a viable option. It was discussed with the patient that compliance postoperatively is of utmost importance. Any deviation on behalf of the patient will decrease the chances of a successful outcome. Patient acknowledged, understands, and would like to move forward with surgery as discussed. The patient was given a consent outlining the general risk of surgery as well as the specifics to the surgical plan. This was carefully discussed giving all options, indications and contraindications regarding the procedure outlined in the consent. All questions were answered to the patient's satisfaction. The patient signed the consent form confirming complete understanding and acceptance of the risks of stated. I specifically stated and inquired if the patient understands and accepts the risks of surgery including infection, failure, prolonged pain, swelling, numbness, recurrence, limited mobility,painful scar, RSDS, overcorrection, under-correction, and loss of limb/life. Death, bleeding, blood clots in the veins or lungs, tendon or blood vessel disturbance, bony conditions, continued pain,stiffness, weakness, and limited function. These were all listed on the consent. Additionally, the postoperative course and treatment was outlined for the patient. Discussion consisted of postoperatively the patient needs to keep the foot elevated for at least the first initial two weeks. I have encouraged movements such as moving from the bed to the sofa or recliner, to the kitchen and the bathroom; quick bursts of movement with the foot elevated.  Longstanding periods of time such as cooking, cleaning, and shopping are not permitted. I reminded the patient that there are only two reasons to have surgery. That being, if their function is impaired and also if they are having pain. If they can answer yes to both these questions, I will move forward with surgery. If they can not, there is no reason to proceed with surgical intervention. Orders Placed This Encounter   Procedures    XR FOOT LEFT (MIN 3 VIEWS)    CT FOOT LEFT WO CONTRAST     Standing Status:   Future     Standing Expiration Date:   3/1/2024    Vitamin D 25 Hydroxy     Standing Status:   Future     Standing Expiration Date:   2/29/2024       No orders of the defined types were placed in this encounter.          RTC in as needed  3/1/2023

## 2023-03-30 PROBLEM — Z01.818 PREOP EXAMINATION: Status: ACTIVE | Noted: 2023-03-30

## 2023-03-30 NOTE — H&P
05/21/2019    KNEE TOTAL ARTHROPLASTY performed [facility] N/A 07/29/2021    EGD BIOPSY OF STOMACH AND ESOPHAGEAL ULCER performed by Hansel Cano MD at [facility] 161 History     Socioeconomic History    Marital status:      Spouse name: None    Number of children: None    Years of education: None    Highest education level: None   Tobacco Use    Smoking status: Former     Packs/day: 0.50     Years: 3.00     Pack years: 1.50     Types: Cigarettes     Quit date: 2008     Years since quitting: 15.2    Smokeless tobacco: Never   Vaping Use    Vaping Use: Never used   Substance and Sexual Activity    Alcohol use: Yes     Comment: occasional- not weekly    Drug use: No       REVIEW OF SYSTEMS      Allergies   Allergen Reactions    Darvon [Propoxyphene] Nausea And Vomiting and Other (See Comments)     Severe headaches    Morphine Nausea And Vomiting and Other (See Comments)     Severe headaches    Augmentin [Amoxicillin-Pot Clavulanate] Diarrhea and Nausea And Vomiting       Current Outpatient Medications on File Prior to Encounter   Medication Sig Dispense Refill    citalopram (CELEXA) 20 MG tablet TAKE ONE TABLET BY MOUTH ONCE DAILY 90 tablet 3    atorvastatin (LIPITOR) 20 MG tablet Take 1 tablet by mouth daily 90 tablet 3    pramipexole (MIRAPEX) 0.5 MG tablet TAKE ONE TABLET BY MOUTH ONCE NIGHTLY 90 tablet 3    benzonatate (TESSALON) 100 MG capsule take 1 capsule by mouth every 6 hours if needed for cough      meloxicam (MOBIC) 15 MG tablet Take 1 tablet by mouth daily 90 tablet 3    SUMAtriptan (IMITREX) 50 MG tablet Take at onset of headache; may repeat in 2 hours; no more than 2 in 24 hours.  9 tablet 11    Magnesium 500 MG CAPS Take 1 capsule by mouth daily      melatonin 5 MG TABS tablet Take 10 mg by mouth nightly as needed      CALCIUM CITRATE PO Take 1,200 mg by mouth daily calciu      Multiple 03/31/2023    HCT 39.2 03/31/2023    MCV 87.6 03/31/2023     03/31/2023     PRELIMINARY EKG REVIEW, DATE: 3-31-23     NORMAL SINUS RHYTHM  NORMAL ECG  79 BPM    SURGERY / PROVISIONAL DIAGNOSES:      FOOT FUSION MULTIPLE MID FOOT JOINT WITH BONE MARROW ASPIRATE CONCENTRATE- LEFT    OSTEOARTHRITIS LEFT FOOT  INSTABILITY LEFT FOOT  LEFT FOOT PAIN    Patient Active Problem List    Diagnosis Date Noted    Preop examination 03/30/2023    Current mild [facility] of major [facility] 02/28/2023    Primary osteoarthritis of right hip 02/09/2021    Degenerative joint disease of right hip     Primary osteoarthritis of left knee 05/21/2019    Bone spur of toe of left foot     Primary osteoarthritis of left foot     Pain, foot, left, chronic     Diarrhea 08/10/2017    Restless leg syndrome, uncontrolled 07/27/2016    Snoring 05/10/2016    Exercise counseling 05/10/2016    Diverticulosis of intestine without bleeding 03/14/2016    Gastroesophageal reflux disease without esophagitis 03/14/2016    Hemorrhoids     Tubular adenoma     Migraine without aura and without status migrainosus, not intractable 11/10/2015    Liver hemangioma     Elevated liver enzymes     Hyperlipidemia with target LDL less than 130 05/11/2015    Migraine headache 05/11/2015    BMI 25.0-25.9,adult 05/11/2015    Diverticulosis     Colon polyp          Preliminary EKG, CBC, BMP completed today has been previewed per myself. CLEARANCE: Dr. Wesley Robins, anesthesia, was contacted and informed of patient's history and planned surgery. Orders received and no clearance required. Vitamin D level drawn today per surgeon is pending at the signing of this note.     Total time spent on encounter- PAT provider minutes: 21-30 minutes     ANNIE Kiran - CNP on 3/31/2023 at 12:55 PM

## 2023-03-31 ENCOUNTER — HOSPITAL ENCOUNTER (OUTPATIENT)
Dept: PREADMISSION TESTING | Age: 73
End: 2023-03-31
Attending: PODIATRIST | Admitting: PODIATRIST
Payer: MEDICARE

## 2023-03-31 VITALS
WEIGHT: 153 LBS | BODY MASS INDEX: 27.11 KG/M2 | HEIGHT: 63 IN | SYSTOLIC BLOOD PRESSURE: 108 MMHG | DIASTOLIC BLOOD PRESSURE: 83 MMHG | RESPIRATION RATE: 16 BRPM | OXYGEN SATURATION: 100 % | HEART RATE: 80 BPM | TEMPERATURE: 97.8 F

## 2023-03-31 DIAGNOSIS — M19.072 PRIMARY OSTEOARTHRITIS OF LEFT FOOT: ICD-10-CM

## 2023-03-31 DIAGNOSIS — Z01.818 PREOP EXAMINATION: ICD-10-CM

## 2023-03-31 PROBLEM — F32.0 CURRENT MILD EPISODE OF MAJOR DEPRESSIVE DISORDER WITHOUT PRIOR EPISODE (HCC): Status: ACTIVE | Noted: 2023-02-28

## 2023-03-31 LAB
ABSOLUTE EOS #: 0 K/UL (ref 0–0.4)
ABSOLUTE LYMPH #: 2.7 K/UL (ref 1–4.8)
ABSOLUTE MONO #: 0.8 K/UL (ref 0.1–1.3)
ANION GAP SERPL CALCULATED.3IONS-SCNC: 10 MMOL/L (ref 9–17)
BASOPHILS # BLD: 0 % (ref 0–2)
BASOPHILS ABSOLUTE: 0 K/UL (ref 0–0.2)
BUN SERPL-MCNC: 15 MG/DL (ref 8–23)
CALCIUM SERPL-MCNC: 9.3 MG/DL (ref 8.6–10.4)
CHLORIDE SERPL-SCNC: 106 MMOL/L (ref 98–107)
CO2 SERPL-SCNC: 23 MMOL/L (ref 20–31)
CREAT SERPL-MCNC: 0.88 MG/DL (ref 0.5–0.9)
EOSINOPHILS RELATIVE PERCENT: 0 % (ref 0–4)
GFR SERPL CREATININE-BSD FRML MDRD: >60 ML/MIN/1.73M2
GLUCOSE SERPL-MCNC: 103 MG/DL (ref 70–99)
HCT VFR BLD AUTO: 39.2 % (ref 36–46)
HGB BLD-MCNC: 13 G/DL (ref 12–16)
LYMPHOCYTES # BLD: 38 % (ref 24–44)
MCH RBC QN AUTO: 29.1 PG (ref 26–34)
MCHC RBC AUTO-ENTMCNC: 33.2 G/DL (ref 31–37)
MCV RBC AUTO: 87.6 FL (ref 80–100)
MONOCYTES # BLD: 11 % (ref 1–7)
PDW BLD-RTO: 14.6 % (ref 11.5–14.9)
PLATELET # BLD AUTO: 209 K/UL (ref 150–450)
PMV BLD AUTO: 8.3 FL (ref 6–12)
POTASSIUM SERPL-SCNC: 5 MMOL/L (ref 3.7–5.3)
RBC # BLD: 4.47 M/UL (ref 4–5.2)
SEG NEUTROPHILS: 51 % (ref 36–66)
SEGMENTED NEUTROPHILS ABSOLUTE COUNT: 3.6 K/UL (ref 1.3–9.1)
SODIUM SERPL-SCNC: 139 MMOL/L (ref 135–144)
WBC # BLD AUTO: 7.1 K/UL (ref 3.5–11)

## 2023-03-31 PROCEDURE — 85025 COMPLETE CBC W/AUTO DIFF WBC: CPT

## 2023-03-31 PROCEDURE — 93005 ELECTROCARDIOGRAM TRACING: CPT | Performed by: ANESTHESIOLOGY

## 2023-03-31 PROCEDURE — 82306 VITAMIN D 25 HYDROXY: CPT

## 2023-03-31 PROCEDURE — APPSS30 APP SPLIT SHARED TIME 16-30 MINUTES: Performed by: NURSE PRACTITIONER

## 2023-03-31 PROCEDURE — 36415 COLL VENOUS BLD VENIPUNCTURE: CPT

## 2023-03-31 PROCEDURE — 80048 BASIC METABOLIC PNL TOTAL CA: CPT

## 2023-03-31 ASSESSMENT — ENCOUNTER SYMPTOMS
BLOOD IN STOOL: 0
DIARRHEA: 0
ABDOMINAL PAIN: 0
VOMITING: 0
SORE THROAT: 0
NAUSEA: 0
TROUBLE SWALLOWING: 0
WHEEZING: 0
ANAL BLEEDING: 0
SHORTNESS OF BREATH: 0
COUGH: 0
RHINORRHEA: 0
CONSTIPATION: 0

## 2023-03-31 NOTE — DISCHARGE INSTRUCTIONS
Pre-op Instructions For Out-Patient Surgery    Medication Instructions:  Please stop herbs and any supplements now (includes vitamins and minerals). Please contact your surgeon and prescribing physician for pre-op instructions for any blood thinners. Stop Meloxicam as directed    If you have inhalers/aerosol treatments at home, please use them the morning of your surgery and bring the inhalers with you to the hospital.    Please take the following medications the morning of your surgery with a sip of water:    Omeprazole    Surgery Instructions:  After midnight before surgery:  Do not eat or drink anything, including water, mints, gum, and hard candy. You may brush your teeth without swallowing. No smoking, chewing tobacco, or street drugs. Please shower or bathe before surgery. If you were given Surgical Scrub Chlorhexidine Gluconate Liquid (CHG), please shower the night before and the morning of your surgery following the detailed instructions you received during your pre-admission visit. Please do not wear any cologne, lotion, powder, deodorant, jewelry, piercings, perfume, makeup, nail polish, hair accessories, or hair spray on the day of surgery. Wear loose comfortable clothing. Leave your valuables at home. Bring a storage case for any glasses/contacts. An adult who is responsible for you MUST drive you home and should be with you for the first 24 hours after surgery. If having out-patient knee and foot surgeries, please arrange for planned crutches, walker, or wheelchair before arriving to the hospital.    The Day of Surgery:  Arrive at [facility] AT [facility] Entrance at the time directed by your surgeon and check in at the desk. If you have a living will or healthcare power of , please bring a copy. You will be taken to the pre-op holding area where you will be prepared for surgery.   A physical assessment will be performed by a nurse practitioner or house officer. Your IV will be started and you will meet your anesthesiologist.    When you go to surgery, your family will be directed to the surgical waiting room, where the doctor should speak with them after your surgery. After surgery, you will be taken to the recovery room then when you are awake and stable you will go to the short stay unit for preparation to be discharged. If you use a Bi-PAP or C-PAP machine, please bring it with you and leave it in the car in case it is needed in recovery room.

## 2023-04-01 LAB — 25(OH)D3 SERPL-MCNC: 26.8 NG/ML

## 2023-04-03 LAB
EKG ATRIAL RATE: 79 BPM
EKG P AXIS: 80 DEGREES
EKG P-R INTERVAL: 160 MS
EKG Q-T INTERVAL: 390 MS
EKG QRS DURATION: 82 MS
EKG QTC CALCULATION (BAZETT): 447 MS
EKG R AXIS: 46 DEGREES
EKG T AXIS: 68 DEGREES
EKG VENTRICULAR RATE: 79 BPM

## 2023-04-03 PROCEDURE — 93010 ELECTROCARDIOGRAM REPORT: CPT | Performed by: INTERNAL MEDICINE

## 2023-04-06 ENCOUNTER — HOSPITAL ENCOUNTER (OUTPATIENT)
Dept: CT IMAGING | Age: 73
Discharge: HOME | End: 2023-04-08
Payer: MEDICARE

## 2023-04-06 DIAGNOSIS — M19.072 PRIMARY OSTEOARTHRITIS OF LEFT FOOT: ICD-10-CM

## 2023-04-06 PROCEDURE — 73700 CT LOWER EXTREMITY W/O DYE: CPT

## 2023-04-13 NOTE — PRE-PROCEDURE INSTRUCTIONS
Nothing to eat after midnight. Y  Are you taking any blood thinners? When was the last day? STOPPED MOBIC 2 WKS  Make sure to use Hibiclens prior to surgery. Y  Remove any jewelry and body piercings. Y  Do you wear glasses? If so, please bring a case to store them in. Are you having any Covid symptoms? N  Do you have any new rashes, infections, etc. that we should be aware of? N  Do you have a ride home the day of surgery? It cannot be a cab or medical transportation.  Y  Verify surgery time and what time to arrive at hospital. [id number]

## 2023-04-14 ENCOUNTER — APPOINTMENT (OUTPATIENT)
Dept: GENERAL RADIOLOGY | Age: 73
End: 2023-04-14
Attending: PODIATRIST
Payer: MEDICARE

## 2023-04-14 ENCOUNTER — HOSPITAL ENCOUNTER (OUTPATIENT)
Age: 73
Setting detail: OUTPATIENT SURGERY
Discharge: HOME | End: 2023-04-14
Attending: PODIATRIST | Admitting: PODIATRIST
Payer: MEDICARE

## 2023-04-14 VITALS
BODY MASS INDEX: 27.11 KG/M2 | WEIGHT: 153 LBS | HEIGHT: 63 IN | OXYGEN SATURATION: 96 % | TEMPERATURE: 97.7 F | RESPIRATION RATE: 22 BRPM | HEART RATE: 118 BPM | DIASTOLIC BLOOD PRESSURE: 72 MMHG | SYSTOLIC BLOOD PRESSURE: 164 MMHG

## 2023-04-14 DIAGNOSIS — R52 PAIN: ICD-10-CM

## 2023-04-14 DIAGNOSIS — G89.18 POST-OPERATIVE PAIN: Primary | ICD-10-CM

## 2023-04-14 PROBLEM — M25.375 INSTABILITY OF FOOT JOINT, LEFT: Status: ACTIVE | Noted: 2023-04-14

## 2023-04-14 PROBLEM — M21.962 DEFORMITY OF METATARSAL BONE OF LEFT FOOT: Status: ACTIVE | Noted: 2023-04-14

## 2023-04-14 PROCEDURE — C1769 GUIDE WIRE: HCPCS | Performed by: PODIATRIST

## 2023-04-14 PROCEDURE — 7100000001 HC PACU RECOVERY - ADDTL 15 MIN: Performed by: PODIATRIST

## 2023-04-14 PROCEDURE — 28735 FUSION OF FOOT BONES: CPT | Performed by: PODIATRIST

## 2023-04-14 PROCEDURE — 7100000000 HC PACU RECOVERY - FIRST 15 MIN: Performed by: PODIATRIST

## 2023-04-14 PROCEDURE — 7100000011 HC PHASE II RECOVERY - ADDTL 15 MIN: Performed by: PODIATRIST

## 2023-04-14 PROCEDURE — 2720000010 HC SURG SUPPLY STERILE: Performed by: PODIATRIST

## 2023-04-14 PROCEDURE — C1713 ANCHOR/SCREW BN/BN,TIS/BN: HCPCS | Performed by: PODIATRIST

## 2023-04-14 PROCEDURE — 7100000010 HC PHASE II RECOVERY - FIRST 15 MIN: Performed by: PODIATRIST

## 2023-04-14 PROCEDURE — 3600000003 HC SURGERY LEVEL 3 BASE: Performed by: PODIATRIST

## 2023-04-14 PROCEDURE — 3700000000 HC ANESTHESIA ATTENDED CARE: Performed by: PODIATRIST

## 2023-04-14 PROCEDURE — 7100000030 HC ASPR PHASE II RECOVERY - FIRST 15 MIN: Performed by: PODIATRIST

## 2023-04-14 PROCEDURE — 2500000003 HC RX 250 WO HCPCS: Performed by: PODIATRIST

## 2023-04-14 PROCEDURE — 3209999900 FLUORO FOR SURGICAL PROCEDURES

## 2023-04-14 PROCEDURE — 2580000003 HC RX 258: Performed by: ANESTHESIOLOGY

## 2023-04-14 PROCEDURE — 2709999900 HC NON-CHARGEABLE SUPPLY: Performed by: PODIATRIST

## 2023-04-14 PROCEDURE — 38232 BONE MARROW HARVEST AUTOLOG: CPT | Performed by: PODIATRIST

## 2023-04-14 PROCEDURE — 73630 X-RAY EXAM OF FOOT: CPT

## 2023-04-14 PROCEDURE — 6370000000 HC RX 637 (ALT 250 FOR IP): Performed by: ANESTHESIOLOGY

## 2023-04-14 PROCEDURE — 3600000013 HC SURGERY LEVEL 3 ADDTL 15MIN: Performed by: PODIATRIST

## 2023-04-14 PROCEDURE — 7100000031 HC ASPR PHASE II RECOVERY - ADDTL 15 MIN: Performed by: PODIATRIST

## 2023-04-14 PROCEDURE — 3700000001 HC ADD 15 MINUTES (ANESTHESIA): Performed by: PODIATRIST

## 2023-04-14 DEVICE — IMPLANTABLE DEVICE: Type: IMPLANTABLE DEVICE | Site: FOOT | Status: FUNCTIONAL

## 2023-04-14 DEVICE — GRAFT BONE SUB 2.5CC ALLOSYNC PURE: Type: IMPLANTABLE DEVICE | Site: FOOT | Status: FUNCTIONAL

## 2023-04-14 DEVICE — SCREW BNE L42MM DIA4MM TI CANN PARTIALLY THRD LO PROF: Type: IMPLANTABLE DEVICE | Site: FOOT | Status: FUNCTIONAL

## 2023-04-14 DEVICE — BIOACTIVE BONE GRAFT SUBSTITUTE, FOAM PACK; BETA-TRICALCIUM PHOSPHATE, TYPE I BOVINE COLLAGEN, AND BIOACTIVE GLASS
Type: IMPLANTABLE DEVICE | Site: FOOT | Status: FUNCTIONAL
Brand: VITOSS BBTRAUMA

## 2023-04-14 DEVICE — SCREW BNE L40MM DIA4MM TI CANN PARTIALLY THRD LO PROF: Type: IMPLANTABLE DEVICE | Site: FOOT | Status: FUNCTIONAL

## 2023-04-14 RX ORDER — RIVAROXABAN 10 MG/1
10 TABLET, FILM COATED ORAL
Qty: 30 TABLET | Refills: 1 | Status: SHIPPED | OUTPATIENT
Start: 2023-04-14

## 2023-04-14 RX ORDER — CEPHALEXIN 500 MG/1
500 CAPSULE ORAL 3 TIMES DAILY
Qty: 21 CAPSULE | Refills: 0 | Status: SHIPPED | OUTPATIENT
Start: 2023-04-14 | End: 2023-04-21

## 2023-04-14 RX ORDER — SODIUM CHLORIDE, SODIUM LACTATE, POTASSIUM CHLORIDE, CALCIUM CHLORIDE 600; 310; 30; 20 MG/100ML; MG/100ML; MG/100ML; MG/100ML
INJECTION, SOLUTION INTRAVENOUS CONTINUOUS
Status: DISCONTINUED | OUTPATIENT
Start: 2023-04-14 | End: 2023-04-14 | Stop reason: HOSPADM

## 2023-04-14 RX ORDER — HYDRALAZINE HYDROCHLORIDE 20 MG/ML
10 INJECTION INTRAMUSCULAR; INTRAVENOUS
Status: DISCONTINUED | OUTPATIENT
Start: 2023-04-14 | End: 2023-04-14 | Stop reason: HOSPADM

## 2023-04-14 RX ORDER — SODIUM CHLORIDE 0.9 % (FLUSH) 0.9 %
5-40 SYRINGE (ML) INJECTION EVERY 12 HOURS SCHEDULED
Status: DISCONTINUED | OUTPATIENT
Start: 2023-04-14 | End: 2023-04-14 | Stop reason: HOSPADM

## 2023-04-14 RX ORDER — OXYCODONE HYDROCHLORIDE AND ACETAMINOPHEN 5; 325 MG/1; MG/1
1 TABLET ORAL EVERY 6 HOURS PRN
Qty: 28 TABLET | Refills: 0 | Status: SHIPPED | OUTPATIENT
Start: 2023-04-14 | End: 2023-04-21

## 2023-04-14 RX ORDER — SODIUM CHLORIDE 0.9 % (FLUSH) 0.9 %
5-40 SYRINGE (ML) INJECTION PRN
Status: DISCONTINUED | OUTPATIENT
Start: 2023-04-14 | End: 2023-04-14 | Stop reason: HOSPADM

## 2023-04-14 RX ORDER — DIPHENHYDRAMINE HYDROCHLORIDE 50 MG/ML
12.5 INJECTION INTRAMUSCULAR; INTRAVENOUS
Status: DISCONTINUED | OUTPATIENT
Start: 2023-04-14 | End: 2023-04-14 | Stop reason: HOSPADM

## 2023-04-14 RX ORDER — LABETALOL HYDROCHLORIDE 5 MG/ML
10 INJECTION, SOLUTION INTRAVENOUS
Status: DISCONTINUED | OUTPATIENT
Start: 2023-04-14 | End: 2023-04-14 | Stop reason: HOSPADM

## 2023-04-14 RX ORDER — LIDOCAINE HYDROCHLORIDE 10 MG/ML
1 INJECTION, SOLUTION EPIDURAL; INFILTRATION; INTRACAUDAL; PERINEURAL
Status: DISCONTINUED | OUTPATIENT
Start: 2023-04-14 | End: 2023-04-14 | Stop reason: HOSPADM

## 2023-04-14 RX ORDER — SODIUM CHLORIDE 9 MG/ML
25 INJECTION, SOLUTION INTRAVENOUS PRN
Status: DISCONTINUED | OUTPATIENT
Start: 2023-04-14 | End: 2023-04-14 | Stop reason: HOSPADM

## 2023-04-14 RX ORDER — ONDANSETRON 2 MG/ML
4 INJECTION INTRAMUSCULAR; INTRAVENOUS
Status: DISCONTINUED | OUTPATIENT
Start: 2023-04-14 | End: 2023-04-14 | Stop reason: HOSPADM

## 2023-04-14 RX ORDER — BUPIVACAINE HYDROCHLORIDE 5 MG/ML
INJECTION, SOLUTION EPIDURAL; INTRACAUDAL PRN
Status: DISCONTINUED | OUTPATIENT
Start: 2023-04-14 | End: 2023-04-14 | Stop reason: ALTCHOICE

## 2023-04-14 RX ORDER — ACETAMINOPHEN 500 MG
1000 TABLET ORAL ONCE
Status: COMPLETED | OUTPATIENT
Start: 2023-04-14 | End: 2023-04-14

## 2023-04-14 RX ORDER — METOCLOPRAMIDE HYDROCHLORIDE 5 MG/ML
10 INJECTION INTRAMUSCULAR; INTRAVENOUS
Status: DISCONTINUED | OUTPATIENT
Start: 2023-04-14 | End: 2023-04-14 | Stop reason: HOSPADM

## 2023-04-14 RX ORDER — FENTANYL CITRATE 0.05 MG/ML
25 INJECTION, SOLUTION INTRAMUSCULAR; INTRAVENOUS EVERY 5 MIN PRN
Status: DISCONTINUED | OUTPATIENT
Start: 2023-04-14 | End: 2023-04-14 | Stop reason: HOSPADM

## 2023-04-14 RX ORDER — HYDROCODONE BITARTRATE AND ACETAMINOPHEN 5; 325 MG/1; MG/1
1 TABLET ORAL EVERY 6 HOURS PRN
Status: DISCONTINUED | OUTPATIENT
Start: 2023-04-14 | End: 2023-04-14 | Stop reason: HOSPADM

## 2023-04-14 RX ORDER — GABAPENTIN 100 MG/1
100 CAPSULE ORAL ONCE
Status: COMPLETED | OUTPATIENT
Start: 2023-04-14 | End: 2023-04-14

## 2023-04-14 RX ORDER — SODIUM CHLORIDE 9 MG/ML
INJECTION, SOLUTION INTRAVENOUS PRN
Status: DISCONTINUED | OUTPATIENT
Start: 2023-04-14 | End: 2023-04-14 | Stop reason: HOSPADM

## 2023-04-14 RX ADMIN — SODIUM CHLORIDE 300 ML: 9 INJECTION, SOLUTION INTRAVENOUS at 12:50

## 2023-04-14 RX ADMIN — ACETAMINOPHEN 1000 MG: 500 TABLET ORAL at 06:00

## 2023-04-14 RX ADMIN — GABAPENTIN 100 MG: 100 CAPSULE ORAL at 06:00

## 2023-04-14 RX ADMIN — SODIUM CHLORIDE, POTASSIUM CHLORIDE, SODIUM LACTATE AND CALCIUM CHLORIDE: 600; 310; 30; 20 INJECTION, SOLUTION INTRAVENOUS at 06:23

## 2023-04-14 ASSESSMENT — PAIN - FUNCTIONAL ASSESSMENT: PAIN_FUNCTIONAL_ASSESSMENT: 0-10

## 2023-04-14 NOTE — INTERVAL H&P NOTE
Update History & Physical    The patient's History and Physical of March 31, 2023 was reviewed with the patient and I examined the patient. There was no change. I concur with findings. Here today for Kathleen Zambrano. Pt AAO x 3 in NAD. HRRR. No adventitious lung sounds. No respiratory distress. NPO p MN. Took omeprazole this am with sip of water. Stopped mobic and supplements two weeks ago. Denies taking anticoagulants or blood thinning medications. Denies recent or current chest pain/pressure, palpitations, SOB, recent URI, fever or chills. Review vitals per RN flowsheet.        Electronically signed by ANNIE Dumont CNP on 4/14/2023 at 5:42 AM

## 2023-04-14 NOTE — BRIEF OP NOTE
PODIATRY BRIEF OP NOTE    PATIENT NAME: Radha García  YOB: 1950  -  74 y.o. female  MRN: [id number]  DATE: 4/14/2023  BILLING #: [id number]    Surgeon(s):  Jhoana Toribio DPM     ASSISTANTS: José Miguel Kong DPM PGY-3 Hattie Carnes D.P.M. PGY-2    PRE-OP DIAGNOSIS:   Pain of left foot  Primary osteoarthritis of left foot  Instability of left foot  Deformity of second and third metatarsals, left foot  Hypermobility of tarsometatarsal joint, left    POST-OP DIAGNOSIS: Same as above. PROCEDURE:   Bone marrow harvest, left tibia  Fusion of multiple midfoot joints with osteotomies of metatarsals 2 and 3, left foot    ANESTHESIA: General     HEMOSTASIS: Pneumatic thigh tourniquet at 300 mmHg for 120 minutes    ESTIMATED BLOOD LOSS: Less than 50cc. MATERIALS:   Implant Name Type Inv. Item Serial No.  Lot No. LRB No. Used Action   GRAFT BNE SUB 2.5CC B TRICALCIUM PHSPTE VERSATILE ULT POR - [id number]  GRAFT BNE SUB 2.5CC B TRICALCIUM PHSPTE VERSATILE ULT POR  [employer] [facility]- [id number] Left 1 Implanted   GRAFT BONE SUB 2.5CC ALL[employer] PURE - [id number]  GRAFT BONE SUB 2.5CC ALLOSYNC PURE  [employer] [facility] [id number] Left 1 Implanted   STAPLE BNE FIX [id number] NIT COMPR W/ INSTR LO PROF FOR - [id number]  STAPLE BNE FIX [id number] NIT COMPR W/ Remona Jonas FOR  [employer] [facility] [id number] Left 1 Implanted   STAPLE BNE FIX [id number] NIT COMPR W/ Remona Jonas FOR - [id number]  STAPLE BNE FIX [id number] NIT COMPR W/ Remona Jonas FOR  [employer]- [id number] Left 1 Implanted   KIT BIOSURGE W/ 2.5CC ALL[employer]YNC PURE - [id number]  KIT BIOSURGE W/ 2.5CC ALLOSYNC PURE  [employer] [facility] [id number] Left 1 Implanted       INJECTABLES:   Pre-op 20cc 1% lidocaine plain  Post-op 20cc 0.5% Marcaine plain. SPECIMEN:   * No specimens in log *    COMPLICATIONS: none    FINDINGS: see op note for detail. Closure with 3-0 monocryl, 3-0 prolene. Dressings consisted of Xeroform, 4 x 4 gauze, Kerlix, Ace.   A

## 2023-04-14 NOTE — PROGRESS NOTES
1240 Raman ordered another 300ml bolus    Bolus completed. Pt feeling no pain. Heart rate and blood pressure unchanged.   Instructed patient and family to take blood pressure twice this afternoon and twice this evening

## 2023-04-14 NOTE — DISCHARGE INSTRUCTIONS
to help decrease swelling and pain. Make sure to have a piece of cloth between the ice bag and your skin. Call your surgeon for the following: You have pain that does not get better after you take pain medicine. For an oral temperature (by mouth) is 101 degrees or higher, chills, or excessive sweating. You have increasing and progressive bleeding or drainage from surgery site. Signs of an infection:  increased swelling, redness, warmth, or hardness around surgery area or yellow or green drainage. Persistent nausea or vomiting and cant keep fluids down. If you are unable to urinate within 8 hours of surgery. Redness or swelling at IV site. For any questions or concerns you may have.

## 2023-04-14 NOTE — PROGRESS NOTES
4/14/23 1:26 PM  [phone] From: Malorie Graves [facility] SURGERY RE: Nestor Riggs Patient's family was asking about patient taking an Aspirin. There is nothing in the paper work for patient to take an Aspirin. What dose should she take and for how long? Read 1:27 PM   4/14/23 1:28 PM   Sent xarelto   4/14/23 1:28 PM  ok thanks  Read 1:28 PM   4/14/23 1:36 PM  Pharmacy stated the Xarelto will be $500 with insurance. Patient was wondering if she can take the Aspirin intead? Read 1:37 PM   4/14/23 1:37 PM   Yes. Just an adult aspirin daily. 4/14/23 1:37 PM  thanks  Read 1:38 PM     Patient's family was asking about patient taking Aspirin post op. Writer put the above Perfect serve out to Dr Gonzales Forward for clarification.  Patient and family member made aware of conversation with doctor

## 2023-04-14 NOTE — OP NOTE
then fenestrated using a 2 mm drill. At this time the bone marrow aspirate concentrate was then mixed with bone allograft on the back table. The allograft mixture was then placed into the arthrodesis sites. The arthrodesis sites were then temporarily fixated with use of provisional K wires and compatible with 4.0 millimeter screws and reduction clamps. Fluoroscopy was then used to confirm adequate alignment of the foot. Once we are pleased with the reduction of the deformities, we proceeded with fixation using 4.0 millimeter screws using appropriate AO technique. Starting with the first tarsometatarsal joint. A screw was inserted from distal dorsal to proximal plantar. Excellent bone purchase was achieved. Placement of the screw was then confirmed using C-arm. Next a screw was placed from the medial cuneiform into the second metatarsal and a proximal plantar to distal dorsal fashion. Excellent purchase was achieved and C-arm was then used to confirm adequate reduction of the deformity as well as safe placement of hardware. We then proceeded to place the intercuneiform screws, a stab incision was made to the medial aspect of the foot and screws inserted from medial to lateral.  Excellent purchase was also achieved and confirmed with use of C arm. Next  a screw was then inserted from medial to lateral from the first metatarsal into the second metatarsal to provide excellent stability to the arthrodesis sites. Fluoroscopy was also used to determine safe and placement of the hardware. After this point the thigh tourniquet had reached 120 minutes, and the tourniquet was deflated. Hyperemic flush was then noted to digits 1 through 5 of the operative foot. Bleeding was then able to be controlled with direct pressure. At this point we proceeded with insertion of a staple into the second tarsometatarsal joint. A 20 mm staple was selected and inserted following manufactures guidelines.

## 2023-04-18 ENCOUNTER — OFFICE VISIT (OUTPATIENT)
Dept: PODIATRY | Age: 73
End: 2023-04-18
Payer: MEDICARE

## 2023-04-18 VITALS — HEIGHT: 63 IN | WEIGHT: 153 LBS | BODY MASS INDEX: 27.11 KG/M2

## 2023-04-18 DIAGNOSIS — M25.375 INSTABILITY OF FOOT JOINT, LEFT: ICD-10-CM

## 2023-04-18 DIAGNOSIS — Z98.890 POST-OPERATIVE STATE: ICD-10-CM

## 2023-04-18 DIAGNOSIS — M19.072 PRIMARY OSTEOARTHRITIS OF LEFT FOOT: Primary | ICD-10-CM

## 2023-04-18 DIAGNOSIS — M79.672 FOOT PAIN, LEFT: ICD-10-CM

## 2023-04-18 PROCEDURE — 99024 POSTOP FOLLOW-UP VISIT: CPT | Performed by: PODIATRIST

## 2023-04-18 PROCEDURE — 29515 APPLICATION SHORT LEG SPLINT: CPT | Performed by: PODIATRIST

## 2023-04-18 NOTE — PROGRESS NOTES
[facility] Podiatry  Post Op note  Chief Complaint   Patient presents with    Post-Op Check     Post op initial left foot         Nicko Emmanuel is a 74y.o. year old female who is 4 day(s) post op from foot surgery. Type: fusion of multiple midfoot joints left. Vital signs are stable. Pain level is 1. Patient denies N/V/F/C. Patient has been compliant with postoperative instructions. Review of Systems    Vascular: DP and PT pulses palpable 2/4, Right Foot and 2/4 on the Left Foot. CFT <3 seconds, Right Foot and <3 seconds on the Left Foot. Edema is absent,  Right Foot and presenton the Left Foot. Neurological:   Sensation present  to light touch to level of digits, both feet. Musculoskeletal:   Muscle strength is 5/5 on the Right Foot and 5/5 on the Left Foot. Structural deformities are absent on the Right Foot and absent on the Left Foot. Integument:  Warm, dry, supple both feet. Incisions are healing well. Wound dehiscence is absent. Infection is absent. Assesment : Post operative progress gradually improving. Diagnosis Orders   1. Primary osteoarthritis of left foot  NE APPLICATION SHORT LEG SPLINT CALF FOOT    NE CAST SUP LONG LEG SPLINT ADULT FIBERGLASS      2. Instability of foot joint, left  NE APPLICATION SHORT LEG SPLINT CALF FOOT    NE CAST SUP LONG LEG SPLINT ADULT FIBERGLASS      3. Foot pain, left  NE APPLICATION SHORT LEG SPLINT CALF FOOT    NE CAST SUP LONG LEG SPLINT ADULT FIBERGLASS      4. Post-operative state  NE APPLICATION SHORT LEG SPLINT CALF FOOT    NE CAST SUP LONG LEG SPLINT ADULT FIBERGLASS            Plan: Sutures in place. Dry sterile dressing applied. Keep surgical site dry. Ice and elevation as directed. Large bulky compressive wrap applied with 2 layers of cast padding, ACE wraps, followed by a posterior fiberglass splint secured with Coban. No orders of the defined types were placed in this encounter. Follow up 2week(s).

## 2023-04-29 PROBLEM — Z01.818 PREOP EXAMINATION: Status: RESOLVED | Noted: 2023-03-30 | Resolved: 2023-04-29

## 2023-05-02 ENCOUNTER — OFFICE VISIT (OUTPATIENT)
Dept: PODIATRY | Age: 73
End: 2023-05-02
Payer: MEDICARE

## 2023-05-02 VITALS — HEIGHT: 63 IN | BODY MASS INDEX: 27.11 KG/M2 | WEIGHT: 153 LBS

## 2023-05-02 DIAGNOSIS — M19.072 PRIMARY OSTEOARTHRITIS OF LEFT FOOT: Primary | ICD-10-CM

## 2023-05-02 DIAGNOSIS — Z98.890 POST-OPERATIVE STATE: ICD-10-CM

## 2023-05-02 DIAGNOSIS — M25.375 INSTABILITY OF FOOT JOINT, LEFT: ICD-10-CM

## 2023-05-02 PROCEDURE — L4360 PNEUMAT WALKING BOOT PRE CST: HCPCS | Performed by: PODIATRIST

## 2023-05-02 PROCEDURE — 99024 POSTOP FOLLOW-UP VISIT: CPT | Performed by: PODIATRIST

## 2023-05-14 PROBLEM — R52 PAIN: Status: RESOLVED | Noted: 2023-04-14 | Resolved: 2023-05-14

## 2023-05-16 ENCOUNTER — OFFICE VISIT (OUTPATIENT)
Dept: PODIATRY | Age: 73
End: 2023-05-16

## 2023-05-16 VITALS — BODY MASS INDEX: 27.11 KG/M2 | WEIGHT: 153 LBS | HEIGHT: 63 IN

## 2023-05-16 DIAGNOSIS — M25.375 INSTABILITY OF FOOT JOINT, LEFT: ICD-10-CM

## 2023-05-16 DIAGNOSIS — Z98.890 POST-OPERATIVE STATE: ICD-10-CM

## 2023-05-16 DIAGNOSIS — M19.072 PRIMARY OSTEOARTHRITIS OF LEFT FOOT: Primary | ICD-10-CM

## 2023-05-16 PROCEDURE — 99024 POSTOP FOLLOW-UP VISIT: CPT | Performed by: PODIATRIST

## 2023-05-16 NOTE — PROGRESS NOTES
[facility] Podiatry  Post Op note  Chief Complaint   Patient presents with    Post-Op Check     Left foot       Kori Domínguez is a 74y.o. year old female who is 4 weeks post op from foot surgery. Type: fusion of multiple midfoot joints left. Vital signs are stable. Pain level is 1. Patient denies N/V/F/C. Patient has been compliant with postoperative instructions. Review of Systems    Vascular: DP and PT pulses palpable 2/4, Right Foot and 2/4 on the Left Foot. CFT <3 seconds, Right Foot and <3 seconds on the Left Foot. Edema is absent,  Right Foot and present on the Left Foot. Neurological:   Sensation present  to light touch to level of digits, both feet. Musculoskeletal:   Muscle strength is 5/5 on the Right Foot and 5/5 on the Left Foot. Structural deformities are absent on the Right Foot and absent on the Left Foot. Integument:  Warm, dry, supple both feet. Incisions are healing well. Wound dehiscence is absent. Infection is absent. Radiographs: Three weightbearing views (AP, Oblique, and Lateral) of the left foot were obtained in the office today and reviewed, revealing no acute fracture, dislocation, or radioopaque foreign body/tumor. Overall alignment is satisfactory. Fusion of the first, second and third tarsal metatarsal joints with screw and staple fixation. Trabecular pattern noted across fusion sites. Electronically signed by Mike Goss DPM       Assesment : Post operative progress gradually improving. Diagnosis Orders   1. Primary osteoarthritis of left foot  XR FOOT LEFT (MIN 3 VIEWS)      2. Instability of foot joint, left  XR FOOT LEFT (MIN 3 VIEWS)      3. Post-operative state  XR FOOT LEFT (MIN 3 VIEWS)            Plan: Pt was evaluated and examined. Patient was given personalized discharge instructions. Pt will follow up in 3 weeks or sooner if any problems arise.  Diagnosis was discussed with the pt and all of their questions were answered

## 2023-06-01 ENCOUNTER — OFFICE VISIT (OUTPATIENT)
Dept: PODIATRY | Age: 73
End: 2023-06-01

## 2023-06-01 VITALS — BODY MASS INDEX: 27.11 KG/M2 | WEIGHT: 153 LBS | HEIGHT: 63 IN

## 2023-06-01 DIAGNOSIS — M25.375 INSTABILITY OF FOOT JOINT, LEFT: ICD-10-CM

## 2023-06-01 DIAGNOSIS — Z98.890 POST-OPERATIVE STATE: ICD-10-CM

## 2023-06-01 DIAGNOSIS — M19.072 PRIMARY OSTEOARTHRITIS OF LEFT FOOT: Primary | ICD-10-CM

## 2023-06-01 PROCEDURE — 99024 POSTOP FOLLOW-UP VISIT: CPT | Performed by: PODIATRIST

## 2023-06-01 NOTE — PROGRESS NOTES
[facility] Podiatry  Post Op note  Chief Complaint   Patient presents with    Post-Op Check     Left Foot          Giorgi Hill is a 74y.o. year old female who is 6 weeks post op from foot surgery. Type: fusion of multiple midfoot joints left. Vital signs are stable. Pain level is 1. Patient denies N/V/F/C. Patient has been compliant with postoperative instructions. Still having swelling. Review of Systems    Vascular: DP and PT pulses palpable 2/4, Right Foot and 2/4 on the Left Foot. CFT <3 seconds, Right Foot and <3 seconds on the Left Foot. Edema is absent,  Right Foot and present on the Left Foot. Neurological:   Sensation present  to light touch to level of digits, both feet. Musculoskeletal:   Muscle strength is 5/5 on the Right Foot and 5/5 on the Left Foot. Structural deformities are absent on the Right Foot and absent on the Left Foot. Integument:  Warm, dry, supple both feet. Incisions are healing well. Wound dehiscence is absent. Infection is absent. Radiographs: Three weightbearing views (AP, Oblique, and Lateral) of the left foot were obtained in the office today and reviewed, revealing no acute fracture, dislocation, or radioopaque foreign body/tumor. Overall alignment is satisfactory. Fusion of the first, second and third tarsal metatarsal joints with screw and staple fixation. Trabecular pattern noted across fusion sites. Electronically signed by Abdias Youssef DPM       Assesment : Post operative progress gradually improving. Diagnosis Orders   1. Primary osteoarthritis of left foot  XR FOOT LEFT (MIN 3 VIEWS)      2. Instability of foot joint, left  XR FOOT LEFT (MIN 3 VIEWS)      3. Post-operative state  XR FOOT LEFT (MIN 3 VIEWS)            Plan: Pt was evaluated and examined. Patient was given personalized discharge instructions. Pt will follow up in 3 weeks or sooner if any problems arise.  Diagnosis was discussed with the pt and all of

## 2023-06-29 ENCOUNTER — OFFICE VISIT (OUTPATIENT)
Dept: PODIATRY | Age: 73
End: 2023-06-29

## 2023-06-29 VITALS — BODY MASS INDEX: 27.11 KG/M2 | HEIGHT: 63 IN | WEIGHT: 153 LBS

## 2023-06-29 DIAGNOSIS — Z98.890 POST-OPERATIVE STATE: ICD-10-CM

## 2023-06-29 DIAGNOSIS — M25.375 INSTABILITY OF FOOT JOINT, LEFT: ICD-10-CM

## 2023-06-29 DIAGNOSIS — M19.072 PRIMARY OSTEOARTHRITIS OF LEFT FOOT: Primary | ICD-10-CM

## 2023-06-29 DIAGNOSIS — M21.70 LEG LENGTH DISCREPANCY: ICD-10-CM

## 2023-06-30 ENCOUNTER — HOSPITAL ENCOUNTER (OUTPATIENT)
Facility: CLINIC | Age: 73
End: 2023-06-30
Payer: MEDICARE

## 2023-06-30 ENCOUNTER — HOSPITAL ENCOUNTER (OUTPATIENT)
Dept: GENERAL RADIOLOGY | Facility: CLINIC | Age: 73
End: 2023-06-30
Payer: MEDICARE

## 2023-06-30 ENCOUNTER — TELEPHONE (OUTPATIENT)
Dept: GASTROENTEROLOGY | Age: 73
End: 2023-06-30

## 2023-06-30 DIAGNOSIS — M21.70 LEG LENGTH DISCREPANCY: ICD-10-CM

## 2023-06-30 PROCEDURE — 77073 BONE LENGTH STUDIES: CPT

## 2023-08-09 ENCOUNTER — OFFICE VISIT (OUTPATIENT)
Dept: PODIATRY | Age: 73
End: 2023-08-09
Payer: MEDICARE

## 2023-08-09 VITALS — BODY MASS INDEX: 27.11 KG/M2 | WEIGHT: 153 LBS | HEIGHT: 63 IN

## 2023-08-09 DIAGNOSIS — R60.0 EDEMA OF LEFT FOOT: ICD-10-CM

## 2023-08-09 DIAGNOSIS — M96.0 NONUNION AFTER ARTHRODESIS: ICD-10-CM

## 2023-08-09 DIAGNOSIS — M19.072 PRIMARY OSTEOARTHRITIS OF LEFT FOOT: Primary | ICD-10-CM

## 2023-08-09 DIAGNOSIS — M79.672 FOOT PAIN, LEFT: ICD-10-CM

## 2023-08-09 DIAGNOSIS — M25.375 INSTABILITY OF FOOT JOINT, LEFT: ICD-10-CM

## 2023-08-09 PROCEDURE — 1123F ACP DISCUSS/DSCN MKR DOCD: CPT | Performed by: PODIATRIST

## 2023-08-09 PROCEDURE — G8417 CALC BMI ABV UP PARAM F/U: HCPCS | Performed by: PODIATRIST

## 2023-08-09 PROCEDURE — 1090F PRES/ABSN URINE INCON ASSESS: CPT | Performed by: PODIATRIST

## 2023-08-09 PROCEDURE — 1036F TOBACCO NON-USER: CPT | Performed by: PODIATRIST

## 2023-08-09 PROCEDURE — 99214 OFFICE O/P EST MOD 30 MIN: CPT | Performed by: PODIATRIST

## 2023-08-09 PROCEDURE — G8400 PT W/DXA NO RESULTS DOC: HCPCS | Performed by: PODIATRIST

## 2023-08-09 PROCEDURE — G8427 DOCREV CUR MEDS BY ELIG CLIN: HCPCS | Performed by: PODIATRIST

## 2023-08-09 PROCEDURE — 3017F COLORECTAL CA SCREEN DOC REV: CPT | Performed by: PODIATRIST

## 2023-08-09 NOTE — PROGRESS NOTES
improving. Diagnosis Orders   1. Primary osteoarthritis of left foot  XR FOOT LEFT (MIN 3 VIEWS)    CT FOOT LEFT WO CONTRAST      2. Instability of foot joint, left  XR FOOT LEFT (MIN 3 VIEWS)    CT FOOT LEFT WO CONTRAST      3. Foot pain, left  XR FOOT LEFT (MIN 3 VIEWS)    CT FOOT LEFT WO CONTRAST      4. Edema of left foot  XR FOOT LEFT (MIN 3 VIEWS)    CT FOOT LEFT WO CONTRAST      5. Nonunion after arthrodesis  CT FOOT LEFT WO CONTRAST            Plan: Pt was evaluated and examined. Patient was given personalized discharge instructions. Pt will follow up in 2-3 weeks or sooner if any problems arise. Diagnosis was discussed with the pt and all of their questions were answered in detail. Proper foot hygiene and care was discussed with the pt. Patient to check feet daily and contact the office with any questions/problems/concerns. Other comorbidity noted and will be managed by PCP. Ice, elevate, rest  OTC compression stockings  Get back into the short CAM walker or surgical shoe    Will order a CT scan to assess  We briefly discussed the use of a bone stimulator as a future option depending on CT finding. No orders of the defined types were placed in this encounter. Follow up 2-3 week(s).

## 2023-08-10 ENCOUNTER — HOSPITAL ENCOUNTER (OUTPATIENT)
Dept: CT IMAGING | Facility: CLINIC | Age: 73
Discharge: HOME | End: 2023-08-12
Payer: MEDICARE

## 2023-08-10 DIAGNOSIS — M25.375 INSTABILITY OF FOOT JOINT, LEFT: ICD-10-CM

## 2023-08-10 DIAGNOSIS — R60.0 EDEMA OF LEFT FOOT: ICD-10-CM

## 2023-08-10 DIAGNOSIS — M96.0 NONUNION AFTER ARTHRODESIS: ICD-10-CM

## 2023-08-10 DIAGNOSIS — M19.072 PRIMARY OSTEOARTHRITIS OF LEFT FOOT: ICD-10-CM

## 2023-08-10 DIAGNOSIS — M79.672 FOOT PAIN, LEFT: ICD-10-CM

## 2023-08-10 PROCEDURE — 73700 CT LOWER EXTREMITY W/O DYE: CPT

## 2023-08-16 ENCOUNTER — TELEPHONE (OUTPATIENT)
Dept: PODIATRY | Age: 73
End: 2023-08-16

## 2023-08-16 DIAGNOSIS — M19.072 PRIMARY OSTEOARTHRITIS OF LEFT FOOT: ICD-10-CM

## 2023-08-16 DIAGNOSIS — M79.672 FOOT PAIN, LEFT: ICD-10-CM

## 2023-08-16 DIAGNOSIS — M96.0 NONUNION AFTER ARTHRODESIS: Primary | ICD-10-CM

## 2023-08-16 DIAGNOSIS — M25.375 INSTABILITY OF FOOT JOINT, LEFT: ICD-10-CM

## 2023-08-16 DIAGNOSIS — R60.0 EDEMA OF LEFT FOOT: ICD-10-CM

## 2023-08-16 NOTE — TELEPHONE ENCOUNTER
----- Message from Mercedes Arevalo DPM sent at 8/15/2023  7:33 AM EDT -----  Let her know CT scan actually looks pretty good  I would recommend immobilization in a hard surgical shoe or CAM walker  And can we try to get a bone stimulator?   Thank you  Let me know and I can put notes in  ----- Message -----  From: Reese Enrique Incoming Radiant Results From [employer]  Sent: 8/11/2023  10:32 AM EDT  To: Mercedes Arevalo DPM

## 2023-08-22 ENCOUNTER — CLINICAL DOCUMENTATION (OUTPATIENT)
Dept: PODIATRY | Age: 73
End: 2023-08-22

## 2023-08-22 DIAGNOSIS — G89.29 PAIN, FOOT, LEFT, CHRONIC: Primary | ICD-10-CM

## 2023-08-22 DIAGNOSIS — M79.672 PAIN, FOOT, LEFT, CHRONIC: Primary | ICD-10-CM

## 2023-08-22 DIAGNOSIS — M96.0 NONUNION AFTER ARTHRODESIS: ICD-10-CM

## 2023-08-22 NOTE — PROGRESS NOTES
Patient had her CT scan and I personally reviewed the scans. There is a non-union of the tarsal-metatarsal arthrodesis site as well as the intercuneiform arthrodesis site. The gap at the nonunion site is 2 mm, less than 1 cm. Fixation is still intact. I ordered a bone stimulator today (ultrasound-based) for the patient. The patient is at increased risk for nonunion, given the following set of issues:  age of the patient, poor bone quality, and lack of osseous healing on xrays and CT scan . Given this specific set of circumstances for this patient, I believe the risks of nonunion and the consequences of that (ie, the impact on her life) more than justify the use of a bone stimulator as a medically necessary device for this patient. Obtaining osseous union may also help avoid a possible future surgical intervention. The patient has an   1. Pain, foot, left, chronic    2. Nonunion after arthrodesis     and these are very difficult to heal and can result in a painful non-union, causing chronic pain and disability. I feel that an Exogen bone stimulator is warranted to aid in healing of this fracture to prevent complications in the future.

## 2023-08-28 ENCOUNTER — TELEPHONE (OUTPATIENT)
Dept: PODIATRY | Age: 73
End: 2023-08-28

## 2023-08-28 NOTE — TELEPHONE ENCOUNTER
Patient called because she received bone stimulator but is unsure where her fracture is and does not know where to apply the stimulator. I looked over notes but I did not see where it is at.  Please advise

## 2023-09-20 ENCOUNTER — OFFICE VISIT (OUTPATIENT)
Dept: GASTROENTEROLOGY | Age: 73
End: 2023-09-20
Payer: MEDICARE

## 2023-09-20 VITALS
HEART RATE: 90 BPM | SYSTOLIC BLOOD PRESSURE: 132 MMHG | DIASTOLIC BLOOD PRESSURE: 83 MMHG | HEIGHT: 63 IN | WEIGHT: 155 LBS | BODY MASS INDEX: 27.46 KG/M2

## 2023-09-20 DIAGNOSIS — R19.7 DIARRHEA, UNSPECIFIED TYPE: ICD-10-CM

## 2023-09-20 DIAGNOSIS — K57.90 DIVERTICULOSIS: ICD-10-CM

## 2023-09-20 DIAGNOSIS — D18.03 LIVER HEMANGIOMA: ICD-10-CM

## 2023-09-20 DIAGNOSIS — D36.9 ADENOMATOUS POLYPS: ICD-10-CM

## 2023-09-20 DIAGNOSIS — K21.9 GASTROESOPHAGEAL REFLUX DISEASE WITHOUT ESOPHAGITIS: Primary | ICD-10-CM

## 2023-09-20 PROCEDURE — 1036F TOBACCO NON-USER: CPT | Performed by: INTERNAL MEDICINE

## 2023-09-20 PROCEDURE — G8400 PT W/DXA NO RESULTS DOC: HCPCS | Performed by: INTERNAL MEDICINE

## 2023-09-20 PROCEDURE — 1123F ACP DISCUSS/DSCN MKR DOCD: CPT | Performed by: INTERNAL MEDICINE

## 2023-09-20 PROCEDURE — 3017F COLORECTAL CA SCREEN DOC REV: CPT | Performed by: INTERNAL MEDICINE

## 2023-09-20 PROCEDURE — 99214 OFFICE O/P EST MOD 30 MIN: CPT | Performed by: INTERNAL MEDICINE

## 2023-09-20 PROCEDURE — G8417 CALC BMI ABV UP PARAM F/U: HCPCS | Performed by: INTERNAL MEDICINE

## 2023-09-20 PROCEDURE — G8427 DOCREV CUR MEDS BY ELIG CLIN: HCPCS | Performed by: INTERNAL MEDICINE

## 2023-09-20 PROCEDURE — 1090F PRES/ABSN URINE INCON ASSESS: CPT | Performed by: INTERNAL MEDICINE

## 2023-09-20 ASSESSMENT — ENCOUNTER SYMPTOMS
ABDOMINAL DISTENTION: 0
ANAL BLEEDING: 0
BACK PAIN: 0
RECTAL PAIN: 0
WHEEZING: 0
BLOOD IN STOOL: 0
ABDOMINAL PAIN: 0
SHORTNESS OF BREATH: 0
CHOKING: 0
COUGH: 0
VOMITING: 0
DIARRHEA: 0
TROUBLE SWALLOWING: 0
NAUSEA: 0
CONSTIPATION: 0

## 2023-10-04 ENCOUNTER — OFFICE VISIT (OUTPATIENT)
Dept: PODIATRY | Age: 73
End: 2023-10-04
Payer: MEDICARE

## 2023-10-04 VITALS — WEIGHT: 155 LBS | HEIGHT: 63 IN | BODY MASS INDEX: 27.46 KG/M2

## 2023-10-04 DIAGNOSIS — G89.29 PAIN, FOOT, LEFT, CHRONIC: ICD-10-CM

## 2023-10-04 DIAGNOSIS — M96.0 NONUNION AFTER ARTHRODESIS: ICD-10-CM

## 2023-10-04 DIAGNOSIS — M79.672 PAIN, FOOT, LEFT, CHRONIC: ICD-10-CM

## 2023-10-04 DIAGNOSIS — M79.672 FOOT PAIN, LEFT: Primary | ICD-10-CM

## 2023-10-04 DIAGNOSIS — M25.375 INSTABILITY OF FOOT JOINT, LEFT: ICD-10-CM

## 2023-10-04 PROCEDURE — 1036F TOBACCO NON-USER: CPT | Performed by: PODIATRIST

## 2023-10-04 PROCEDURE — G8417 CALC BMI ABV UP PARAM F/U: HCPCS | Performed by: PODIATRIST

## 2023-10-04 PROCEDURE — 1123F ACP DISCUSS/DSCN MKR DOCD: CPT | Performed by: PODIATRIST

## 2023-10-04 PROCEDURE — 99213 OFFICE O/P EST LOW 20 MIN: CPT | Performed by: PODIATRIST

## 2023-10-04 PROCEDURE — G8484 FLU IMMUNIZE NO ADMIN: HCPCS | Performed by: PODIATRIST

## 2023-10-04 PROCEDURE — 1090F PRES/ABSN URINE INCON ASSESS: CPT | Performed by: PODIATRIST

## 2023-10-04 PROCEDURE — G8400 PT W/DXA NO RESULTS DOC: HCPCS | Performed by: PODIATRIST

## 2023-10-04 PROCEDURE — G8427 DOCREV CUR MEDS BY ELIG CLIN: HCPCS | Performed by: PODIATRIST

## 2023-10-04 PROCEDURE — 3017F COLORECTAL CA SCREEN DOC REV: CPT | Performed by: PODIATRIST

## 2023-10-04 NOTE — PROGRESS NOTES
[facility]  Return Patient  Chief Complaint   Patient presents with    Foot Pain     Left foot bone stim fup        Asif Quintanilla is a 74y.o. year old female who is almost 6 months post op from foot surgery. Type: fusion of multiple midfoot joints left. 4/14/23. She has been using the bone stimulator for over 30 days, and doing better. Able to walk without pain. Some swelling medial foot. She tripped over shoe laces twisted her surgery foot,  and had pain. Vital signs are stable. Pain level is 5. Patient denies N/V/F/C. Patient has been compliant with postoperative instructions. Review of Systems    Vascular: DP and PT pulses palpable 2/4, Right Foot and 2/4 on the Left Foot. CFT <3 seconds, Right Foot and <3 seconds on the Left Foot. Edema is absent,  Right Foot and present on the Left Foot. Neurological:   Sensation present  to light touch to level of digits, both feet. Musculoskeletal:   Muscle strength is 5/5 on the Right Foot and 5/5 on the Left Foot. Structural deformities are absent on the Right Foot and absent on the Left Foot. No Swelling over the midfoot right. No Pain base of first metatarsal and no pain between the bases of the 1st and 2nd metatarsals. Integument:  Warm, dry, supple both feet. Incisions are healing well. Wound dehiscence is absent. Infection is absent. Radiographs: Three weightbearing views (AP, Oblique, and Lateral) of the left foot were obtained in the office today and reviewed, revealing no acute fracture, dislocation, or radioopaque foreign body/tumor. Overall alignment is satisfactory. Fusion of the first, second and third tarsal metatarsal joints with screw and staple fixation. Trabecular pattern noted across fusion sites. Electronically signed by Coleman Kenny DPM       Assesment : Post operative progress gradually improving. Diagnosis Orders   1. Foot pain, left  XR FOOT LEFT (MIN 3 VIEWS)      2.  Nonunion

## 2023-10-26 ENCOUNTER — TRANSCRIBE ORDERS (OUTPATIENT)
Dept: ADMINISTRATIVE | Age: 73
End: 2023-10-26

## 2023-10-26 DIAGNOSIS — M85.80 OSTEOPENIA AFTER MENOPAUSE: ICD-10-CM

## 2023-10-26 DIAGNOSIS — Z78.0 OSTEOPENIA AFTER MENOPAUSE: ICD-10-CM

## 2023-10-26 DIAGNOSIS — Z12.31 ENCOUNTER FOR SCREENING MAMMOGRAM FOR BREAST CANCER: Primary | ICD-10-CM

## 2023-11-20 ENCOUNTER — HOSPITAL ENCOUNTER (OUTPATIENT)
Dept: WOMENS IMAGING | Age: 73
Discharge: HOME | End: 2023-11-22
Payer: MEDICARE

## 2023-11-20 DIAGNOSIS — Z78.0 OSTEOPENIA AFTER MENOPAUSE: ICD-10-CM

## 2023-11-20 DIAGNOSIS — Z12.31 ENCOUNTER FOR SCREENING MAMMOGRAM FOR BREAST CANCER: ICD-10-CM

## 2023-11-20 DIAGNOSIS — M85.80 OSTEOPENIA AFTER MENOPAUSE: ICD-10-CM

## 2023-11-20 PROCEDURE — 77063 BREAST TOMOSYNTHESIS BI: CPT

## 2023-11-20 PROCEDURE — 77080 DXA BONE DENSITY AXIAL: CPT

## 2023-12-14 NOTE — PROGRESS NOTES
[facility] Podiatry  Post Op note  Chief Complaint   Patient presents with    Post-Op Check     Left foot suture removal        Eugene Perdomo is a 74y.o. year old female who is 2 weeks post op from foot surgery. Type: fusion of multiple midfoot joints left. Vital signs are stable. Pain level is 1. Patient denies N/V/F/C. Patient has been compliant with postoperative instructions. Review of Systems    Vascular: DP and PT pulses palpable 2/4, Right Foot and 2/4 on the Left Foot. CFT <3 seconds, Right Foot and <3 seconds on the Left Foot. Edema is absent,  Right Foot and present on the Left Foot. Neurological:   Sensation present  to light touch to level of digits, both feet. Musculoskeletal:   Muscle strength is 5/5 on the Right Foot and 5/5 on the Left Foot. Structural deformities are absent on the Right Foot and absent on the Left Foot. Integument:  Warm, dry, supple both feet. Incisions are healing well. Wound dehiscence is absent. Infection is absent. Assesment : Post operative progress gradually improving. Diagnosis Orders   1. Primary osteoarthritis of left foot  VA PNEUMAT WALKING BOOT PRE CST      2. Instability of foot joint, left  VA PNEUMAT WALKING BOOT PRE CST      3. Post-operative state  VA PNEUMAT WALKING BOOT PRE CST            Plan: Sutures removed. Dry sterile dressing applied. Keep surgical site dry. Ice and elevation as directed. No weight  Tubi     I have dispensed a pneumatic equalizer walker. Due to the patient's diagnosis and related symptoms this is medically necessary for the treatment. The function of this device is to restrict and limit motion and provide stabilization and compression to the affected area. This device will allow the patient to slowly increase strength and ROM of the extremity. Specific instructions on weight bearing and ROM exercises were discussed and given to the patient.  The goals and function of this device was
Detail Level: Detailed
Detail Level: Zone
Detail Level: Simple

## 2024-03-13 ENCOUNTER — OFFICE VISIT (OUTPATIENT)
Dept: PODIATRY | Age: 74
End: 2024-03-13
Payer: MEDICARE

## 2024-03-13 VITALS — WEIGHT: 156 LBS | HEIGHT: 63 IN | BODY MASS INDEX: 27.64 KG/M2

## 2024-03-13 DIAGNOSIS — T85.848A PAIN FROM IMPLANTED HARDWARE, INITIAL ENCOUNTER: Primary | ICD-10-CM

## 2024-03-13 DIAGNOSIS — M81.6 LOCALIZED OSTEOPOROSIS (LEQUESNE): ICD-10-CM

## 2024-03-13 DIAGNOSIS — M96.0 NONUNION AFTER ARTHRODESIS: ICD-10-CM

## 2024-03-13 DIAGNOSIS — M25.375 INSTABILITY OF FOOT JOINT, LEFT: ICD-10-CM

## 2024-03-13 DIAGNOSIS — M79.672 FOOT PAIN, LEFT: ICD-10-CM

## 2024-03-13 PROCEDURE — G8484 FLU IMMUNIZE NO ADMIN: HCPCS | Performed by: PODIATRIST

## 2024-03-13 PROCEDURE — G8427 DOCREV CUR MEDS BY ELIG CLIN: HCPCS | Performed by: PODIATRIST

## 2024-03-13 PROCEDURE — 1123F ACP DISCUSS/DSCN MKR DOCD: CPT | Performed by: PODIATRIST

## 2024-03-13 PROCEDURE — G8417 CALC BMI ABV UP PARAM F/U: HCPCS | Performed by: PODIATRIST

## 2024-03-13 PROCEDURE — 1036F TOBACCO NON-USER: CPT | Performed by: PODIATRIST

## 2024-03-13 PROCEDURE — 99213 OFFICE O/P EST LOW 20 MIN: CPT | Performed by: PODIATRIST

## 2024-03-13 PROCEDURE — 1090F PRES/ABSN URINE INCON ASSESS: CPT | Performed by: PODIATRIST

## 2024-03-13 PROCEDURE — G8399 PT W/DXA RESULTS DOCUMENT: HCPCS | Performed by: PODIATRIST

## 2024-03-13 NOTE — PROGRESS NOTES
[facility] Podiatry  Return Patient  Chief Complaint   Patient presents with    Foot Pain     Left foot        Zaina Reina is a 74 y.o. year old female who is almost 1 year post op from foot surgery. Type: fusion of multiple midfoot joints left. 4/14/23.     She used the bone stimulator. Able to walk without pain, or minimal pain in orthotics and walking tied shoes, but if she ties them too tight, this causes pain. Still some swelling medial foot.     Taking tylenol 3x per day, also Mobic 15 mg daily. Not much help with pain.     She has stumbled a few times, but does not think that she damaged anything with the surgery.     Most of pain when barefooted.     Vital signs are stable.  Pain level is 5.  Patient denies N/V/F/C. Patient has been compliant with postoperative instructions.     Review of Systems    Vascular: DP and PT pulses palpable 2/4, Right Foot and 2/4 on the Left Foot.     CFT <3 seconds, Right Foot and <3 seconds on the Left Foot.    Edema is absent,  Right Foot and present on the Left Foot.      Neurological:   Sensation present  to light touch to level of digits, both feet.      Musculoskeletal:   Muscle strength is 5/5 on the Right Foot and 5/5 on the Left Foot. Structural deformities are absent on the Right Foot and absent on the Left Foot.   local Swelling over the base of the first met and medial cuneiform. No pain central midfoot left. No pain lateral foot today.      Integument:  Warm, dry, supple both feet.  Incisions are healing well.   Wound dehiscence is absent. Infection is absent.     Radiographs: Three weightbearing views (AP, Oblique, and Lateral) of the left foot were obtained in the office today and reviewed, revealing no acute fracture, dislocation, or radioopaque foreign body/tumor. Overall alignment is satisfactory. Fusion of the first, second and third tarsal metatarsal joints with screw and staple fixation. Trabecular pattern noted across fusion sites. However, there is

## 2024-03-15 ENCOUNTER — HOSPITAL ENCOUNTER (OUTPATIENT)
Age: 74
Setting detail: SPECIMEN
Discharge: HOME | End: 2024-03-15

## 2024-03-15 ENCOUNTER — HOSPITAL ENCOUNTER (OUTPATIENT)
Dept: CT IMAGING | Facility: CLINIC | Age: 74
End: 2024-03-15
Payer: MEDICARE

## 2024-03-15 DIAGNOSIS — T85.848A PAIN FROM IMPLANTED HARDWARE, INITIAL ENCOUNTER: ICD-10-CM

## 2024-03-15 DIAGNOSIS — M79.672 FOOT PAIN, LEFT: ICD-10-CM

## 2024-03-15 DIAGNOSIS — M96.0 NONUNION AFTER ARTHRODESIS: ICD-10-CM

## 2024-03-15 DIAGNOSIS — M25.375 INSTABILITY OF FOOT JOINT, LEFT: ICD-10-CM

## 2024-03-15 DIAGNOSIS — M81.6 LOCALIZED OSTEOPOROSIS (LEQUESNE): ICD-10-CM

## 2024-03-15 LAB — 25(OH)D3 SERPL-MCNC: 29.8 NG/ML (ref 30–100)

## 2024-03-15 PROCEDURE — 73700 CT LOWER EXTREMITY W/O DYE: CPT

## 2024-03-19 ENCOUNTER — TELEPHONE (OUTPATIENT)
Dept: PODIATRY | Age: 74
End: 2024-03-19

## 2024-03-19 NOTE — TELEPHONE ENCOUNTER
----- Message from Terese Rodriguez DPM sent at 3/19/2024 12:37 PM EDT -----  Let her know Vit D is still a little low  CT scan showed incomplete fusion of one of the joints, where her pain is.   We can try the bone stimulator again, wearing good shoes, no barefoot walking    ----- Message -----  From: Aman Enrique Incoming Lab Results From [facility] [place]  Sent: 3/15/2024  10:18 PM EDT  To: Terese Rodriguez DPM

## 2024-05-07 ENCOUNTER — OFFICE VISIT (OUTPATIENT)
Dept: PODIATRY | Age: 74
End: 2024-05-07
Payer: MEDICARE

## 2024-05-07 VITALS — BODY MASS INDEX: 27.64 KG/M2 | WEIGHT: 156 LBS | HEIGHT: 63 IN

## 2024-05-07 DIAGNOSIS — M96.0 NONUNION AFTER ARTHRODESIS: Primary | ICD-10-CM

## 2024-05-07 DIAGNOSIS — M79.672 FOOT PAIN, LEFT: ICD-10-CM

## 2024-05-07 DIAGNOSIS — M25.375 INSTABILITY OF FOOT JOINT, LEFT: ICD-10-CM

## 2024-05-07 DIAGNOSIS — T85.848A PAIN FROM IMPLANTED HARDWARE, INITIAL ENCOUNTER: ICD-10-CM

## 2024-05-07 PROCEDURE — G8427 DOCREV CUR MEDS BY ELIG CLIN: HCPCS | Performed by: PODIATRIST

## 2024-05-07 PROCEDURE — 1090F PRES/ABSN URINE INCON ASSESS: CPT | Performed by: PODIATRIST

## 2024-05-07 PROCEDURE — G8417 CALC BMI ABV UP PARAM F/U: HCPCS | Performed by: PODIATRIST

## 2024-05-07 PROCEDURE — 99213 OFFICE O/P EST LOW 20 MIN: CPT | Performed by: PODIATRIST

## 2024-05-07 PROCEDURE — 1123F ACP DISCUSS/DSCN MKR DOCD: CPT | Performed by: PODIATRIST

## 2024-05-07 PROCEDURE — 1036F TOBACCO NON-USER: CPT | Performed by: PODIATRIST

## 2024-05-07 PROCEDURE — G8399 PT W/DXA RESULTS DOCUMENT: HCPCS | Performed by: PODIATRIST

## 2024-05-07 RX ORDER — LISINOPRIL 10 MG/1
10 TABLET ORAL DAILY
COMMUNITY

## 2024-05-07 NOTE — PROGRESS NOTES
[facility] Podiatry  Return Patient  Chief Complaint   Patient presents with    Foot Pain     Left foot, still has some sharp pain but overall doing good        Zaina Reina is a 74 y.o. year old female who is 1 year post op from foot surgery. Type: fusion of multiple midfoot joints left. 4/14/23. Follow up CT scan. I reviewed that last month and sent her recommendations. her  D is still a little low  CT scan showed incomplete fusion of one of the joints, where her pain is.   I recommended the bone stimulator again, wearing good shoes, no barefoot walking. She is doing better. Used the bone stimulator again, edema down. Able to be active. Has new shoes. Wearing insoles. And a lift on the left.       Review of Systems    Vascular: DP and PT pulses palpable 2/4, Right Foot and 2/4 on the Left Foot.     CFT <3 seconds, Right Foot and <3 seconds on the Left Foot.    Edema is absent,  Right Foot and resolved on the Left Foot.      Neurological:   Sensation present  to light touch to level of digits, both feet.      Musculoskeletal:   Muscle strength is 5/5 on the Right Foot and 5/5 on the Left Foot. Structural deformities are absent on the Right Foot and absent on the Left Foot.   local Swelling over the base of the first met and medial cuneiform-resolved No pain central midfoot left. No pain lateral foot today.      Integument:  Warm, dry, supple both feet.  Incisions are healing well.   Wound dehiscence is absent. Infection is absent.     Radiographs: Three weightbearing views (AP, Oblique, and Lateral) of the left foot were obtained in the office today and reviewed, revealing no acute fracture, dislocation, or radioopaque foreign body/tumor. Overall alignment is satisfactory. Fusion of the first, second and third tarsal metatarsal joints with screw and staple fixation. Trabecular pattern noted across fusion sites. However, there is some lucency around the screw heads in the first metatarsal.     Electronically

## 2024-11-21 ENCOUNTER — HOSPITAL ENCOUNTER (OUTPATIENT)
Dept: WOMENS IMAGING | Age: 74
Discharge: HOME | End: 2024-11-23
Payer: MEDICARE

## 2024-11-21 VITALS — BODY MASS INDEX: 27.64 KG/M2 | HEIGHT: 63 IN | WEIGHT: 156 LBS

## 2024-11-21 DIAGNOSIS — Z12.31 VISIT FOR SCREENING MAMMOGRAM: ICD-10-CM

## 2024-11-21 PROCEDURE — 77063 BREAST TOMOSYNTHESIS BI: CPT

## 2025-08-27 ENCOUNTER — OFFICE VISIT (OUTPATIENT)
Dept: ORTHOPEDIC SURGERY | Age: 75
End: 2025-08-27
Payer: MEDICARE

## 2025-08-27 VITALS — HEIGHT: 63 IN | WEIGHT: 156 LBS | RESPIRATION RATE: 18 BRPM | BODY MASS INDEX: 27.64 KG/M2

## 2025-08-27 DIAGNOSIS — M25.552 PAIN OF LEFT HIP: Primary | ICD-10-CM

## 2025-08-27 PROCEDURE — 99213 OFFICE O/P EST LOW 20 MIN: CPT | Performed by: ORTHOPAEDIC SURGERY

## 2025-08-27 PROCEDURE — G8427 DOCREV CUR MEDS BY ELIG CLIN: HCPCS | Performed by: ORTHOPAEDIC SURGERY

## 2025-08-27 PROCEDURE — 1036F TOBACCO NON-USER: CPT | Performed by: ORTHOPAEDIC SURGERY

## 2025-08-27 PROCEDURE — 1090F PRES/ABSN URINE INCON ASSESS: CPT | Performed by: ORTHOPAEDIC SURGERY

## 2025-08-27 PROCEDURE — 1123F ACP DISCUSS/DSCN MKR DOCD: CPT | Performed by: ORTHOPAEDIC SURGERY

## 2025-08-27 PROCEDURE — 1159F MED LIST DOCD IN RCRD: CPT | Performed by: ORTHOPAEDIC SURGERY

## 2025-08-27 PROCEDURE — G8399 PT W/DXA RESULTS DOCUMENT: HCPCS | Performed by: ORTHOPAEDIC SURGERY

## 2025-08-27 PROCEDURE — G8419 CALC BMI OUT NRM PARAM NOF/U: HCPCS | Performed by: ORTHOPAEDIC SURGERY

## 2025-08-27 PROCEDURE — 1125F AMNT PAIN NOTED PAIN PRSNT: CPT | Performed by: ORTHOPAEDIC SURGERY

## (undated) DEVICE — INTENDED FOR TISSUE SEPARATION, AND OTHER PROCEDURES THAT REQUIRE A SHARP SURGICAL BLADE TO PUNCTURE OR CUT.: Brand: BARD-PARKER ® CARBON RIB-BACK BLADES

## (undated) DEVICE — SUTURE PROL SZ 4-0 L18IN NONABSORBABLE BLU L19MM PS-2 3/8 8682G

## (undated) DEVICE — FORCEPS BX L240CM WRK CHN 2.8MM STD CAP W/ NDL MIC MESH

## (undated) DEVICE — SUTURE VCRL SZ 0 L36IN ABSRB UD CT-1 L36MM 1/2 CIR TAPR PNT VCP946H

## (undated) DEVICE — COVER,TABLE,HEAVY DUTY,50"X90",STRL: Brand: MEDLINE

## (undated) DEVICE — GAUZE,SPONGE,FLUFF,6"X6.75",STRL,5/TRAY: Brand: MEDLINE

## (undated) DEVICE — GLOVE ORANGE PI 8 1/2   MSG9085

## (undated) DEVICE — SUTURE VCRL + SZ 3-0 L27IN ABSRB WHT FS-1 3/8 CIR REV CUT VCP442H

## (undated) DEVICE — 3M™ WARMING BLANKET, UPPER BODY, 10 PER CASE, 42268: Brand: BAIR HUGGER™

## (undated) DEVICE — SOLUTION IV IRRIG POUR BRL 0.9% SODIUM CHL 2F7124

## (undated) DEVICE — BANDAGE,ELASTIC,ESMARK,STERILE,4"X9',LF: Brand: MEDLINE

## (undated) DEVICE — STOCKINETTE ORTH W6XL48IN OFF WHT SGL PLY UNBLEACHED COT RIB

## (undated) DEVICE — Z INACTIVE USE 2660664 SOLUTION IRRIG 3000ML 0.9% SOD CHL USP UROMATIC PLAS CONT

## (undated) DEVICE — CHLORAPREP 26ML ORANGE

## (undated) DEVICE — 4-PORT MANIFOLD: Brand: NEPTUNE 2

## (undated) DEVICE — Device

## (undated) DEVICE — DUAL CUT SAGITTAL BLADE

## (undated) DEVICE — SUTURE VCRL + SZ 4-0 L27IN ABSRB WHT FS-2 3/8 CIR REV CUT VCP422H

## (undated) DEVICE — GLOVE SURG SZ 85 STD WHT LTX SYN POLYMER BEAD REINF ANTI RL

## (undated) DEVICE — SUTURE STRATAFIX SPRL MCRYL + SZ 2 0 L27IN ABSRB UD W NDL SXMP1B419

## (undated) DEVICE — BLANKET WRM W29.9XL79.1IN UP BODY FORC AIR MISTRAL-AIR

## (undated) DEVICE — SINGLE PORT MANIFOLD: Brand: NEPTUNE 2

## (undated) DEVICE — SOLUTION IV IRRIG WATER 1000ML POUR BRL 2F7114

## (undated) DEVICE — C-ARMOR C-ARM EQUIPMENT COVERS CLEAR STERILE UNIVERSAL FIT 12 PER CASE: Brand: C-ARMOR

## (undated) DEVICE — BIT DRL L30MM DIA3.2MM DISP FOR G7 2 MOBILITY CONSTRUCT

## (undated) DEVICE — JOINT PREP INSTRUMENT KIT: Brand: ORTHOLOC™ 2

## (undated) DEVICE — SUTURE PROL SZ 3-0 L18IN NONABSORBABLE BLU L24MM FS-1 3/8 8684G

## (undated) DEVICE — BANDAGE COMPR W4INXL5YD WHT BGE POLY COT M E WRP WV HK AND

## (undated) DEVICE — GLOVE ORTHO 8   MSG9480

## (undated) DEVICE — MARKER,SKIN,WI/RULER AND LABELS: Brand: MEDLINE

## (undated) DEVICE — AIRLIFE™ NASAL OXYGEN CANNULA CURVED, FLARED TIP, WITH 7 FEET (2.1 M) CRUSH RESISTANT TUBING, OVER-THE-EAR STYLE: Brand: AIRLIFE™

## (undated) DEVICE — DRESSING HYDROCOLLOID BORDER 35X10 IN ALUM PRIMASEAL

## (undated) DEVICE — DEVICE CLOSURE SKIN SURG

## (undated) DEVICE — KIT AUTOTRNS APPL AERO 2 SET SYR 2 TIP FOR PLT SEP SYS GPS

## (undated) DEVICE — SOLUTION IRRIG 1000ML 0.9% SOD CHL USP POUR PLAS BTL

## (undated) DEVICE — COOLER THER 20-31IN L CRYO COMB W/ PD KNEE TB GRAV FLO

## (undated) DEVICE — MASTISOL ADHESIVE LIQ 2/3ML

## (undated) DEVICE — DRESSING,GAUZE,XEROFORM,CURAD,1"X8",ST: Brand: CURAD

## (undated) DEVICE — KIT SEP W/ BLD DRAW TB SYR NDL TRNQT PD

## (undated) DEVICE — BNDG,ELSTC,MATRIX,STRL,4"X5YD,LF,HOOK&LP: Brand: MEDLINE

## (undated) DEVICE — COVER,TABLE,60X90,STERILE: Brand: MEDLINE

## (undated) DEVICE — PADDING CAST W4INXL4YD SPUN DACRON POLY POR SYN VERSATILE

## (undated) DEVICE — 450 ML BOTTLE OF 0.05% CHLORHEXIDINE GLUCONATE IN 99.95% STERILE WATER FOR IRRIGATION, USP AND APPLICATOR.: Brand: IRRISEPT ANTIMICROBIAL WOUND LAVAGE

## (undated) DEVICE — DRESSING PETRO W3XL3IN OIL EMUL N ADH GZ KNIT IMPREG CELOS

## (undated) DEVICE — SUTURE VCRL + SZ 2-0 L27IN ABSRB UD CT-2 L26MM 1/2 CIR TAPR VCP269H

## (undated) DEVICE — BANDAGE,SELF ADHRNT,COFLEX,4"X5YD,STRL: Brand: COLABEL

## (undated) DEVICE — SPONGE LAP W18XL18IN WHT COT 4 PLY FLD STRUNG RADPQ DISP ST 2 PER PACK

## (undated) DEVICE — BANDAGE,GAUZE,BULKEE II,4.5"X4.1YD,STRL: Brand: MEDLINE

## (undated) DEVICE — DRESSING TRNSPAR W5XL4.5IN FLM SHT SEMIPERMEABLE WIND

## (undated) DEVICE — DEFENDO AIR WATER SUCTION AND BIOPSY VALVE KIT FOR  OLYMPUS: Brand: DEFENDO AIR/WATER/SUCTION AND BIOPSY VALVE

## (undated) DEVICE — DRESSING GZ W3XL16IN CELOS ACETT OIL EMUL N ADH

## (undated) DEVICE — DRESSING PETRO W3XL8IN OIL EMUL N ADH GZ KNIT IMPREG CELOS

## (undated) DEVICE — FORCEPS BX L240CM JAW DIA2.4MM ORNG L CAP W/ NDL DISP RAD

## (undated) DEVICE — PAD,ABDOMINAL,8"X7.5",ST,LF,20/BX: Brand: MEDLINE INDUSTRIES, INC.

## (undated) DEVICE — SPLINT QUICK STEP SCOTCHCAST 5 X 30

## (undated) DEVICE — MERCY HEALTH ST CHARLES: Brand: MEDLINE INDUSTRIES, INC.

## (undated) DEVICE — SET HNDPC W COAX BNE CLN TIP SUCT TB BTTRY PWR DISPOSABLE

## (undated) DEVICE — CANNULA NSL AD L2IN ETCO2 SAMP SFT CRUSH RESIST FEM AIRLFE

## (undated) DEVICE — 2108 SERIES SAGITTAL BLADE FLARED, GROUND  (29.0 X 1.32 X 84.0MM)

## (undated) DEVICE — CEMENT MIXING SYSTEM WITH FEMORAL BREAKWAY NOZZLE: Brand: REVOLUTION

## (undated) DEVICE — C-ARM: Brand: UNBRANDED

## (undated) DEVICE — STERILE PATIENT PROTECTIVE PAD FOR IMP® KNEE POSITIONERS & COHESIVE WRAP (10 / CASE): Brand: DE MAYO KNEE POSITIONER®

## (undated) DEVICE — CLOSURE SKIN FLX NONINVASIVE PRELOC TECHNOLOGY FOR 24IN

## (undated) DEVICE — ENDO KIT W/SYRINGE: Brand: MEDLINE INDUSTRIES, INC.

## (undated) DEVICE — BLADE SAW W9XL25MM THK0.38MM CUT THK0.43MM REPL SAG OSC THN

## (undated) DEVICE — BITEBLOCK 54FR W/ DENT RIM BLOX

## (undated) DEVICE — OSCILLATING SAW BLADE, 9MM X 24.6MM X 0.64MM: Brand: MICROAIRE®

## (undated) DEVICE — SUTURE STRATAFIX SYMMETRIC PDS + SZ 1 L18IN ABSRB VLT L48MM SXPP1A400

## (undated) DEVICE — SUTURE PDS + SZ 2 0 L27IN ABSRB VLT L36MM CT 1 1 2 CIR PDP339H

## (undated) DEVICE — ZIMMER® STERILE DISPOSABLE TOURNIQUET CUFF WITH PLC, DUAL PORT, SINGLE BLADDER, 18 IN. (46 CM)

## (undated) DEVICE — GAUZE,SPONGE,4"X4",16PLY,XRAY,STRL,LF: Brand: MEDLINE

## (undated) DEVICE — Z DISCONTINUED USE 2744636  DRESSING AQUACEL 14 IN ALG W3.5XL14IN POLYUR FLM CVR W/ HYDRCOLL

## (undated) DEVICE — LARGE TEAR CROSS CUT RASP, 7MM X 14MM: Brand: MICROAIRE®

## (undated) DEVICE — COVER,MAYO STAND,XL,STERILE: Brand: MEDLINE

## (undated) DEVICE — GLOVE SURG SZ 8 L12IN FNGR THK13MIL BRN LTX SYN POLYMER W

## (undated) DEVICE — BIT DRL DIA2.5MM FT ANK S STL CANN FOR QUICKFIX SCR SYS

## (undated) DEVICE — SUTURE MCRYL + SZ 3-0 L27IN ABSRB UD PS1 L24MM 3/8 CIR REV MCP936H

## (undated) DEVICE — DRAPE,REIN 53X77,STERILE: Brand: MEDLINE

## (undated) DEVICE — ZIMMER® STERILE DISPOSABLE TOURNIQUET CUFF WITH PLC, DUAL PORT, SINGLE BLADDER, 24 IN. (61 CM)

## (undated) DEVICE — DUP USE 393023 JELLY LUBRICATING EZ 2OZ

## (undated) DEVICE — TRAY BNE MAR CPRP BMA PLT SENS 1 BTTN AUTOMATION USED TO